# Patient Record
Sex: FEMALE | Race: WHITE | NOT HISPANIC OR LATINO | URBAN - METROPOLITAN AREA
[De-identification: names, ages, dates, MRNs, and addresses within clinical notes are randomized per-mention and may not be internally consistent; named-entity substitution may affect disease eponyms.]

---

## 2017-08-05 ENCOUNTER — INPATIENT (INPATIENT)
Facility: HOSPITAL | Age: 27
LOS: 2 days | Discharge: HOME | End: 2017-08-08
Attending: OBSTETRICS & GYNECOLOGY | Admitting: OBSTETRICS & GYNECOLOGY
Payer: COMMERCIAL

## 2017-08-05 VITALS — WEIGHT: 164.24 LBS | HEIGHT: 60 IN

## 2017-08-05 DIAGNOSIS — Z3A.00 WEEKS OF GESTATION OF PREGNANCY NOT SPECIFIED: ICD-10-CM

## 2017-08-05 DIAGNOSIS — O16.9 UNSPECIFIED MATERNAL HYPERTENSION, UNSPECIFIED TRIMESTER: ICD-10-CM

## 2017-08-05 DIAGNOSIS — O26.899 OTHER SPECIFIED PREGNANCY RELATED CONDITIONS, UNSPECIFIED TRIMESTER: ICD-10-CM

## 2017-08-05 LAB
ALBUMIN SERPL ELPH-MCNC: 3.3 G/DL — SIGNIFICANT CHANGE UP (ref 3.3–5)
ALBUMIN SERPL ELPH-MCNC: 3.3 G/DL — SIGNIFICANT CHANGE UP (ref 3.3–5)
ALP SERPL-CCNC: 161 U/L — HIGH (ref 40–120)
ALP SERPL-CCNC: 164 U/L — HIGH (ref 40–120)
ALT FLD-CCNC: 30 U/L — SIGNIFICANT CHANGE UP (ref 4–33)
ALT FLD-CCNC: 34 U/L — HIGH (ref 4–33)
APPEARANCE UR: SIGNIFICANT CHANGE UP
APTT BLD: 29.1 SEC — SIGNIFICANT CHANGE UP (ref 27.5–37.4)
APTT BLD: 30.2 SEC — SIGNIFICANT CHANGE UP (ref 27.5–37.4)
AST SERPL-CCNC: 24 U/L — SIGNIFICANT CHANGE UP (ref 4–32)
AST SERPL-CCNC: 28 U/L — SIGNIFICANT CHANGE UP (ref 4–32)
BACTERIA # UR AUTO: HIGH
BASOPHILS # BLD AUTO: 0.02 K/UL — SIGNIFICANT CHANGE UP (ref 0–0.2)
BASOPHILS # BLD AUTO: 0.03 K/UL — SIGNIFICANT CHANGE UP (ref 0–0.2)
BASOPHILS NFR BLD AUTO: 0.2 % — SIGNIFICANT CHANGE UP (ref 0–2)
BASOPHILS NFR BLD AUTO: 0.2 % — SIGNIFICANT CHANGE UP (ref 0–2)
BILIRUB SERPL-MCNC: 0.4 MG/DL — SIGNIFICANT CHANGE UP (ref 0.2–1.2)
BILIRUB SERPL-MCNC: 0.6 MG/DL — SIGNIFICANT CHANGE UP (ref 0.2–1.2)
BILIRUB UR-MCNC: NEGATIVE — SIGNIFICANT CHANGE UP
BLD GP AB SCN SERPL QL: NEGATIVE — SIGNIFICANT CHANGE UP
BLOOD UR QL VISUAL: NEGATIVE — SIGNIFICANT CHANGE UP
BUN SERPL-MCNC: 7 MG/DL — SIGNIFICANT CHANGE UP (ref 7–23)
BUN SERPL-MCNC: 7 MG/DL — SIGNIFICANT CHANGE UP (ref 7–23)
CALCIUM SERPL-MCNC: 9.1 MG/DL — SIGNIFICANT CHANGE UP (ref 8.4–10.5)
CALCIUM SERPL-MCNC: 9.2 MG/DL — SIGNIFICANT CHANGE UP (ref 8.4–10.5)
CHLORIDE SERPL-SCNC: 100 MMOL/L — SIGNIFICANT CHANGE UP (ref 98–107)
CHLORIDE SERPL-SCNC: 98 MMOL/L — SIGNIFICANT CHANGE UP (ref 98–107)
CO2 SERPL-SCNC: 18 MMOL/L — LOW (ref 22–31)
CO2 SERPL-SCNC: 19 MMOL/L — LOW (ref 22–31)
COLOR SPEC: SIGNIFICANT CHANGE UP
CREAT ?TM UR-MCNC: 38.63 MG/DL — SIGNIFICANT CHANGE UP
CREAT SERPL-MCNC: 0.53 MG/DL — SIGNIFICANT CHANGE UP (ref 0.5–1.3)
CREAT SERPL-MCNC: 0.55 MG/DL — SIGNIFICANT CHANGE UP (ref 0.5–1.3)
EOSINOPHIL # BLD AUTO: 0.05 K/UL — SIGNIFICANT CHANGE UP (ref 0–0.5)
EOSINOPHIL # BLD AUTO: 0.1 K/UL — SIGNIFICANT CHANGE UP (ref 0–0.5)
EOSINOPHIL NFR BLD AUTO: 0.4 % — SIGNIFICANT CHANGE UP (ref 0–6)
EOSINOPHIL NFR BLD AUTO: 1 % — SIGNIFICANT CHANGE UP (ref 0–6)
FIBRINOGEN PPP-MCNC: 633.3 MG/DL — HIGH (ref 310–510)
GLUCOSE SERPL-MCNC: 64 MG/DL — LOW (ref 70–99)
GLUCOSE SERPL-MCNC: 91 MG/DL — SIGNIFICANT CHANGE UP (ref 70–99)
GLUCOSE UR-MCNC: NEGATIVE — SIGNIFICANT CHANGE UP
HCT VFR BLD CALC: 34.7 % — SIGNIFICANT CHANGE UP (ref 34.5–45)
HCT VFR BLD CALC: 35 % — SIGNIFICANT CHANGE UP (ref 34.5–45)
HGB BLD-MCNC: 12.2 G/DL — SIGNIFICANT CHANGE UP (ref 11.5–15.5)
HGB BLD-MCNC: 12.2 G/DL — SIGNIFICANT CHANGE UP (ref 11.5–15.5)
IMM GRANULOCYTES # BLD AUTO: 0.09 # — SIGNIFICANT CHANGE UP
IMM GRANULOCYTES # BLD AUTO: 0.12 # — SIGNIFICANT CHANGE UP
IMM GRANULOCYTES NFR BLD AUTO: 0.9 % — SIGNIFICANT CHANGE UP (ref 0–1.5)
IMM GRANULOCYTES NFR BLD AUTO: 0.9 % — SIGNIFICANT CHANGE UP (ref 0–1.5)
INR BLD: 0.87 — LOW (ref 0.88–1.17)
INR BLD: 0.89 — SIGNIFICANT CHANGE UP (ref 0.88–1.17)
KETONES UR-MCNC: NEGATIVE — SIGNIFICANT CHANGE UP
LDH SERPL L TO P-CCNC: 171 U/L — SIGNIFICANT CHANGE UP (ref 135–225)
LDH SERPL L TO P-CCNC: 189 U/L — SIGNIFICANT CHANGE UP (ref 135–225)
LEUKOCYTE ESTERASE UR-ACNC: HIGH
LYMPHOCYTES # BLD AUTO: 1.17 K/UL — SIGNIFICANT CHANGE UP (ref 1–3.3)
LYMPHOCYTES # BLD AUTO: 1.4 K/UL — SIGNIFICANT CHANGE UP (ref 1–3.3)
LYMPHOCYTES # BLD AUTO: 10.3 % — LOW (ref 13–44)
LYMPHOCYTES # BLD AUTO: 11.8 % — LOW (ref 13–44)
MCHC RBC-ENTMCNC: 29.5 PG — SIGNIFICANT CHANGE UP (ref 27–34)
MCHC RBC-ENTMCNC: 29.9 PG — SIGNIFICANT CHANGE UP (ref 27–34)
MCHC RBC-ENTMCNC: 34.9 % — SIGNIFICANT CHANGE UP (ref 32–36)
MCHC RBC-ENTMCNC: 35.2 % — SIGNIFICANT CHANGE UP (ref 32–36)
MCV RBC AUTO: 84.5 FL — SIGNIFICANT CHANGE UP (ref 80–100)
MCV RBC AUTO: 85 FL — SIGNIFICANT CHANGE UP (ref 80–100)
MONOCYTES # BLD AUTO: 0.7 K/UL — SIGNIFICANT CHANGE UP (ref 0–0.9)
MONOCYTES # BLD AUTO: 0.92 K/UL — HIGH (ref 0–0.9)
MONOCYTES NFR BLD AUTO: 6.8 % — SIGNIFICANT CHANGE UP (ref 2–14)
MONOCYTES NFR BLD AUTO: 7 % — SIGNIFICANT CHANGE UP (ref 2–14)
MUCOUS THREADS # UR AUTO: SIGNIFICANT CHANGE UP
NEUTROPHILS # BLD AUTO: 11.02 K/UL — HIGH (ref 1.8–7.4)
NEUTROPHILS # BLD AUTO: 7.86 K/UL — HIGH (ref 1.8–7.4)
NEUTROPHILS NFR BLD AUTO: 79.1 % — HIGH (ref 43–77)
NEUTROPHILS NFR BLD AUTO: 81.4 % — HIGH (ref 43–77)
NITRITE UR-MCNC: NEGATIVE — SIGNIFICANT CHANGE UP
NRBC # FLD: 0 — SIGNIFICANT CHANGE UP
NRBC # FLD: 0 — SIGNIFICANT CHANGE UP
PH UR: 6.5 — SIGNIFICANT CHANGE UP (ref 4.6–8)
PLATELET # BLD AUTO: 243 K/UL — SIGNIFICANT CHANGE UP (ref 150–400)
PLATELET # BLD AUTO: 249 K/UL — SIGNIFICANT CHANGE UP (ref 150–400)
PMV BLD: 10.2 FL — SIGNIFICANT CHANGE UP (ref 7–13)
PMV BLD: 10.3 FL — SIGNIFICANT CHANGE UP (ref 7–13)
POTASSIUM SERPL-MCNC: 4 MMOL/L — SIGNIFICANT CHANGE UP (ref 3.5–5.3)
POTASSIUM SERPL-MCNC: 4.1 MMOL/L — SIGNIFICANT CHANGE UP (ref 3.5–5.3)
POTASSIUM SERPL-SCNC: 4 MMOL/L — SIGNIFICANT CHANGE UP (ref 3.5–5.3)
POTASSIUM SERPL-SCNC: 4.1 MMOL/L — SIGNIFICANT CHANGE UP (ref 3.5–5.3)
PROT SERPL-MCNC: 6.3 G/DL — SIGNIFICANT CHANGE UP (ref 6–8.3)
PROT SERPL-MCNC: 6.4 G/DL — SIGNIFICANT CHANGE UP (ref 6–8.3)
PROT UR-MCNC: 11.5 MG/DL — SIGNIFICANT CHANGE UP
PROT UR-MCNC: NEGATIVE — SIGNIFICANT CHANGE UP
PROTHROM AB SERPL-ACNC: 9.7 SEC — LOW (ref 9.8–13.1)
PROTHROM AB SERPL-ACNC: 9.9 SEC — SIGNIFICANT CHANGE UP (ref 9.8–13.1)
RBC # BLD: 4.08 M/UL — SIGNIFICANT CHANGE UP (ref 3.8–5.2)
RBC # BLD: 4.14 M/UL — SIGNIFICANT CHANGE UP (ref 3.8–5.2)
RBC # FLD: 13.1 % — SIGNIFICANT CHANGE UP (ref 10.3–14.5)
RBC # FLD: 13.1 % — SIGNIFICANT CHANGE UP (ref 10.3–14.5)
RBC CASTS # UR COMP ASSIST: SIGNIFICANT CHANGE UP (ref 0–?)
RH IG SCN BLD-IMP: POSITIVE — SIGNIFICANT CHANGE UP
RH IG SCN BLD-IMP: POSITIVE — SIGNIFICANT CHANGE UP
SODIUM SERPL-SCNC: 133 MMOL/L — LOW (ref 135–145)
SODIUM SERPL-SCNC: 134 MMOL/L — LOW (ref 135–145)
SP GR SPEC: 1.01 — SIGNIFICANT CHANGE UP (ref 1–1.03)
SQUAMOUS # UR AUTO: SIGNIFICANT CHANGE UP
T PALLIDUM AB TITR SER: NEGATIVE — SIGNIFICANT CHANGE UP
URATE SERPL-MCNC: 6 MG/DL — SIGNIFICANT CHANGE UP (ref 2.5–7)
URATE SERPL-MCNC: 6.3 MG/DL — SIGNIFICANT CHANGE UP (ref 2.5–7)
UROBILINOGEN FLD QL: NORMAL E.U. — SIGNIFICANT CHANGE UP (ref 0.1–0.2)
WBC # BLD: 13.54 K/UL — HIGH (ref 3.8–10.5)
WBC # BLD: 9.94 K/UL — SIGNIFICANT CHANGE UP (ref 3.8–10.5)
WBC # FLD AUTO: 13.54 K/UL — HIGH (ref 3.8–10.5)
WBC # FLD AUTO: 9.94 K/UL — SIGNIFICANT CHANGE UP (ref 3.8–10.5)
WBC UR QL: HIGH (ref 0–?)

## 2017-08-05 RX ORDER — OXYTOCIN 10 UNIT/ML
333.33 VIAL (ML) INJECTION
Qty: 20 | Refills: 0 | Status: DISCONTINUED | OUTPATIENT
Start: 2017-08-05 | End: 2017-08-06

## 2017-08-05 RX ORDER — MAGNESIUM SULFATE 500 MG/ML
4 VIAL (ML) INJECTION ONCE
Qty: 0 | Refills: 0 | Status: COMPLETED | OUTPATIENT
Start: 2017-08-05 | End: 2017-08-05

## 2017-08-05 RX ORDER — LABETALOL HCL 100 MG
200 TABLET ORAL
Qty: 0 | Refills: 0 | Status: DISCONTINUED | OUTPATIENT
Start: 2017-08-05 | End: 2017-08-08

## 2017-08-05 RX ORDER — MAGNESIUM SULFATE 500 MG/ML
1.5 VIAL (ML) INJECTION
Qty: 40 | Refills: 0 | Status: COMPLETED | OUTPATIENT
Start: 2017-08-05 | End: 2017-08-06

## 2017-08-05 RX ORDER — CITRIC ACID/SODIUM CITRATE 300-500 MG
15 SOLUTION, ORAL ORAL EVERY 4 HOURS
Qty: 0 | Refills: 0 | Status: DISCONTINUED | OUTPATIENT
Start: 2017-08-05 | End: 2017-08-06

## 2017-08-05 RX ORDER — SODIUM CHLORIDE 9 MG/ML
1000 INJECTION, SOLUTION INTRAVENOUS
Qty: 0 | Refills: 0 | Status: DISCONTINUED | OUTPATIENT
Start: 2017-08-05 | End: 2017-08-06

## 2017-08-05 RX ORDER — OXYTOCIN 10 UNIT/ML
2 VIAL (ML) INJECTION
Qty: 30 | Refills: 0 | Status: DISCONTINUED | OUTPATIENT
Start: 2017-08-05 | End: 2017-08-07

## 2017-08-05 RX ORDER — SODIUM CHLORIDE 9 MG/ML
1000 INJECTION, SOLUTION INTRAVENOUS ONCE
Qty: 0 | Refills: 0 | Status: COMPLETED | OUTPATIENT
Start: 2017-08-05 | End: 2017-08-05

## 2017-08-05 RX ADMIN — Medication 300 GRAM(S): at 14:34

## 2017-08-05 RX ADMIN — SODIUM CHLORIDE 2000 MILLILITER(S): 9 INJECTION, SOLUTION INTRAVENOUS at 20:42

## 2017-08-05 RX ADMIN — SODIUM CHLORIDE 125 MILLILITER(S): 9 INJECTION, SOLUTION INTRAVENOUS at 14:35

## 2017-08-05 RX ADMIN — Medication 50 GM/HR: at 19:10

## 2017-08-05 RX ADMIN — Medication 2 MILLIUNIT(S)/MIN: at 15:01

## 2017-08-05 RX ADMIN — Medication 50 GM/HR: at 15:00

## 2017-08-06 ENCOUNTER — TRANSCRIPTION ENCOUNTER (OUTPATIENT)
Age: 27
End: 2017-08-06

## 2017-08-06 LAB
ALBUMIN SERPL ELPH-MCNC: 2.7 G/DL — LOW (ref 3.3–5)
ALBUMIN SERPL ELPH-MCNC: 3 G/DL — LOW (ref 3.3–5)
ALBUMIN SERPL ELPH-MCNC: 3.3 G/DL — SIGNIFICANT CHANGE UP (ref 3.3–5)
ALP SERPL-CCNC: 126 U/L — HIGH (ref 40–120)
ALP SERPL-CCNC: 160 U/L — HIGH (ref 40–120)
ALP SERPL-CCNC: 169 U/L — HIGH (ref 40–120)
ALT FLD-CCNC: 28 U/L — SIGNIFICANT CHANGE UP (ref 4–33)
ALT FLD-CCNC: 35 U/L — HIGH (ref 4–33)
ALT FLD-CCNC: 36 U/L — HIGH (ref 4–33)
APTT BLD: 27.6 SEC — SIGNIFICANT CHANGE UP (ref 27.5–37.4)
APTT BLD: 28 SEC — SIGNIFICANT CHANGE UP (ref 27.5–37.4)
APTT BLD: 28.7 SEC — SIGNIFICANT CHANGE UP (ref 27.5–37.4)
AST SERPL-CCNC: 32 U/L — SIGNIFICANT CHANGE UP (ref 4–32)
AST SERPL-CCNC: 37 U/L — HIGH (ref 4–32)
AST SERPL-CCNC: 38 U/L — HIGH (ref 4–32)
BASOPHILS # BLD AUTO: 0.03 K/UL — SIGNIFICANT CHANGE UP (ref 0–0.2)
BASOPHILS # BLD AUTO: 0.05 K/UL — SIGNIFICANT CHANGE UP (ref 0–0.2)
BASOPHILS # BLD AUTO: 0.06 K/UL — SIGNIFICANT CHANGE UP (ref 0–0.2)
BASOPHILS NFR BLD AUTO: 0.2 % — SIGNIFICANT CHANGE UP (ref 0–2)
BASOPHILS NFR BLD AUTO: 0.2 % — SIGNIFICANT CHANGE UP (ref 0–2)
BASOPHILS NFR BLD AUTO: 0.3 % — SIGNIFICANT CHANGE UP (ref 0–2)
BILIRUB SERPL-MCNC: 0.5 MG/DL — SIGNIFICANT CHANGE UP (ref 0.2–1.2)
BILIRUB SERPL-MCNC: 0.7 MG/DL — SIGNIFICANT CHANGE UP (ref 0.2–1.2)
BILIRUB SERPL-MCNC: 0.9 MG/DL — SIGNIFICANT CHANGE UP (ref 0.2–1.2)
BUN SERPL-MCNC: 10 MG/DL — SIGNIFICANT CHANGE UP (ref 7–23)
BUN SERPL-MCNC: 11 MG/DL — SIGNIFICANT CHANGE UP (ref 7–23)
BUN SERPL-MCNC: 8 MG/DL — SIGNIFICANT CHANGE UP (ref 7–23)
CALCIUM SERPL-MCNC: 8.8 MG/DL — SIGNIFICANT CHANGE UP (ref 8.4–10.5)
CALCIUM SERPL-MCNC: 9.2 MG/DL — SIGNIFICANT CHANGE UP (ref 8.4–10.5)
CALCIUM SERPL-MCNC: 9.4 MG/DL — SIGNIFICANT CHANGE UP (ref 8.4–10.5)
CHLORIDE SERPL-SCNC: 93 MMOL/L — LOW (ref 98–107)
CHLORIDE SERPL-SCNC: 93 MMOL/L — LOW (ref 98–107)
CHLORIDE SERPL-SCNC: 94 MMOL/L — LOW (ref 98–107)
CO2 SERPL-SCNC: 15 MMOL/L — LOW (ref 22–31)
CO2 SERPL-SCNC: 17 MMOL/L — LOW (ref 22–31)
CO2 SERPL-SCNC: 17 MMOL/L — LOW (ref 22–31)
CREAT SERPL-MCNC: 0.81 MG/DL — SIGNIFICANT CHANGE UP (ref 0.5–1.3)
CREAT SERPL-MCNC: 1.2 MG/DL — SIGNIFICANT CHANGE UP (ref 0.5–1.3)
CREAT SERPL-MCNC: 1.36 MG/DL — HIGH (ref 0.5–1.3)
EOSINOPHIL # BLD AUTO: 0 K/UL — SIGNIFICANT CHANGE UP (ref 0–0.5)
EOSINOPHIL # BLD AUTO: 0 K/UL — SIGNIFICANT CHANGE UP (ref 0–0.5)
EOSINOPHIL # BLD AUTO: 0.01 K/UL — SIGNIFICANT CHANGE UP (ref 0–0.5)
EOSINOPHIL NFR BLD AUTO: 0 % — SIGNIFICANT CHANGE UP (ref 0–6)
EOSINOPHIL NFR BLD AUTO: 0 % — SIGNIFICANT CHANGE UP (ref 0–6)
EOSINOPHIL NFR BLD AUTO: 0.1 % — SIGNIFICANT CHANGE UP (ref 0–6)
FIBRINOGEN PPP-MCNC: 620 MG/DL — HIGH (ref 310–510)
FIBRINOGEN PPP-MCNC: 634 MG/DL — HIGH (ref 310–510)
FIBRINOGEN PPP-MCNC: 640.9 MG/DL — HIGH (ref 310–510)
GLUCOSE SERPL-MCNC: 117 MG/DL — HIGH (ref 70–99)
GLUCOSE SERPL-MCNC: 146 MG/DL — HIGH (ref 70–99)
GLUCOSE SERPL-MCNC: 77 MG/DL — SIGNIFICANT CHANGE UP (ref 70–99)
HCT VFR BLD CALC: 27.7 % — LOW (ref 34.5–45)
HCT VFR BLD CALC: 34.4 % — LOW (ref 34.5–45)
HCT VFR BLD CALC: 37.6 % — SIGNIFICANT CHANGE UP (ref 34.5–45)
HGB BLD-MCNC: 10.1 G/DL — LOW (ref 11.5–15.5)
HGB BLD-MCNC: 12.2 G/DL — SIGNIFICANT CHANGE UP (ref 11.5–15.5)
HGB BLD-MCNC: 13.2 G/DL — SIGNIFICANT CHANGE UP (ref 11.5–15.5)
IMM GRANULOCYTES # BLD AUTO: 0.12 # — SIGNIFICANT CHANGE UP
IMM GRANULOCYTES # BLD AUTO: 0.22 # — SIGNIFICANT CHANGE UP
IMM GRANULOCYTES # BLD AUTO: 0.26 # — SIGNIFICANT CHANGE UP
IMM GRANULOCYTES NFR BLD AUTO: 0.7 % — SIGNIFICANT CHANGE UP (ref 0–1.5)
IMM GRANULOCYTES NFR BLD AUTO: 1.2 % — SIGNIFICANT CHANGE UP (ref 0–1.5)
IMM GRANULOCYTES NFR BLD AUTO: 1.2 % — SIGNIFICANT CHANGE UP (ref 0–1.5)
INR BLD: 0.82 — LOW (ref 0.88–1.17)
INR BLD: 0.83 — LOW (ref 0.88–1.17)
INR BLD: 0.84 — LOW (ref 0.88–1.17)
LDH SERPL L TO P-CCNC: 201 U/L — SIGNIFICANT CHANGE UP (ref 135–225)
LDH SERPL L TO P-CCNC: 293 U/L — HIGH (ref 135–225)
LDH SERPL L TO P-CCNC: 318 U/L — HIGH (ref 135–225)
LYMPHOCYTES # BLD AUTO: 0.68 K/UL — LOW (ref 1–3.3)
LYMPHOCYTES # BLD AUTO: 1.18 K/UL — SIGNIFICANT CHANGE UP (ref 1–3.3)
LYMPHOCYTES # BLD AUTO: 1.26 K/UL — SIGNIFICANT CHANGE UP (ref 1–3.3)
LYMPHOCYTES # BLD AUTO: 3.1 % — LOW (ref 13–44)
LYMPHOCYTES # BLD AUTO: 6.5 % — LOW (ref 13–44)
LYMPHOCYTES # BLD AUTO: 6.8 % — LOW (ref 13–44)
MAGNESIUM SERPL-MCNC: 5.2 MG/DL — HIGH (ref 1.6–2.6)
MAGNESIUM SERPL-MCNC: 6.6 MG/DL — HIGH (ref 1.6–2.6)
MAGNESIUM SERPL-MCNC: 8.3 MG/DL — CRITICAL HIGH (ref 1.6–2.6)
MCHC RBC-ENTMCNC: 30.4 PG — SIGNIFICANT CHANGE UP (ref 27–34)
MCHC RBC-ENTMCNC: 30.4 PG — SIGNIFICANT CHANGE UP (ref 27–34)
MCHC RBC-ENTMCNC: 30.5 PG — SIGNIFICANT CHANGE UP (ref 27–34)
MCHC RBC-ENTMCNC: 35.1 % — SIGNIFICANT CHANGE UP (ref 32–36)
MCHC RBC-ENTMCNC: 35.5 % — SIGNIFICANT CHANGE UP (ref 32–36)
MCHC RBC-ENTMCNC: 36.5 % — HIGH (ref 32–36)
MCV RBC AUTO: 83.4 FL — SIGNIFICANT CHANGE UP (ref 80–100)
MCV RBC AUTO: 85.8 FL — SIGNIFICANT CHANGE UP (ref 80–100)
MCV RBC AUTO: 86.8 FL — SIGNIFICANT CHANGE UP (ref 80–100)
MONOCYTES # BLD AUTO: 0.99 K/UL — HIGH (ref 0–0.9)
MONOCYTES # BLD AUTO: 1.07 K/UL — HIGH (ref 0–0.9)
MONOCYTES # BLD AUTO: 1.19 K/UL — HIGH (ref 0–0.9)
MONOCYTES NFR BLD AUTO: 4.6 % — SIGNIFICANT CHANGE UP (ref 2–14)
MONOCYTES NFR BLD AUTO: 5.8 % — SIGNIFICANT CHANGE UP (ref 2–14)
MONOCYTES NFR BLD AUTO: 6.5 % — SIGNIFICANT CHANGE UP (ref 2–14)
NEUTROPHILS # BLD AUTO: 15.69 K/UL — HIGH (ref 1.8–7.4)
NEUTROPHILS # BLD AUTO: 15.94 K/UL — HIGH (ref 1.8–7.4)
NEUTROPHILS # BLD AUTO: 19.71 K/UL — HIGH (ref 1.8–7.4)
NEUTROPHILS NFR BLD AUTO: 85.8 % — HIGH (ref 43–77)
NEUTROPHILS NFR BLD AUTO: 86.1 % — HIGH (ref 43–77)
NEUTROPHILS NFR BLD AUTO: 90.9 % — HIGH (ref 43–77)
NRBC # FLD: 0 — SIGNIFICANT CHANGE UP
PLATELET # BLD AUTO: 223 K/UL — SIGNIFICANT CHANGE UP (ref 150–400)
PLATELET # BLD AUTO: 241 K/UL — SIGNIFICANT CHANGE UP (ref 150–400)
PLATELET # BLD AUTO: 268 K/UL — SIGNIFICANT CHANGE UP (ref 150–400)
PMV BLD: 10.2 FL — SIGNIFICANT CHANGE UP (ref 7–13)
PMV BLD: 10.2 FL — SIGNIFICANT CHANGE UP (ref 7–13)
PMV BLD: 10.4 FL — SIGNIFICANT CHANGE UP (ref 7–13)
POTASSIUM SERPL-MCNC: 4 MMOL/L — SIGNIFICANT CHANGE UP (ref 3.5–5.3)
POTASSIUM SERPL-MCNC: 4 MMOL/L — SIGNIFICANT CHANGE UP (ref 3.5–5.3)
POTASSIUM SERPL-MCNC: 4.4 MMOL/L — SIGNIFICANT CHANGE UP (ref 3.5–5.3)
POTASSIUM SERPL-SCNC: 4 MMOL/L — SIGNIFICANT CHANGE UP (ref 3.5–5.3)
POTASSIUM SERPL-SCNC: 4 MMOL/L — SIGNIFICANT CHANGE UP (ref 3.5–5.3)
POTASSIUM SERPL-SCNC: 4.4 MMOL/L — SIGNIFICANT CHANGE UP (ref 3.5–5.3)
PROT SERPL-MCNC: 5.2 G/DL — LOW (ref 6–8.3)
PROT SERPL-MCNC: 6.1 G/DL — SIGNIFICANT CHANGE UP (ref 6–8.3)
PROT SERPL-MCNC: 6.4 G/DL — SIGNIFICANT CHANGE UP (ref 6–8.3)
PROTHROM AB SERPL-ACNC: 9.2 SEC — LOW (ref 9.8–13.1)
PROTHROM AB SERPL-ACNC: 9.4 SEC — LOW (ref 9.8–13.1)
PROTHROM AB SERPL-ACNC: 9.4 SEC — LOW (ref 9.8–13.1)
RBC # BLD: 3.32 M/UL — LOW (ref 3.8–5.2)
RBC # BLD: 4.01 M/UL — SIGNIFICANT CHANGE UP (ref 3.8–5.2)
RBC # BLD: 4.33 M/UL — SIGNIFICANT CHANGE UP (ref 3.8–5.2)
RBC # FLD: 13 % — SIGNIFICANT CHANGE UP (ref 10.3–14.5)
RBC # FLD: 13.1 % — SIGNIFICANT CHANGE UP (ref 10.3–14.5)
RBC # FLD: 13.2 % — SIGNIFICANT CHANGE UP (ref 10.3–14.5)
SODIUM SERPL-SCNC: 127 MMOL/L — LOW (ref 135–145)
SODIUM SERPL-SCNC: 128 MMOL/L — LOW (ref 135–145)
SODIUM SERPL-SCNC: 131 MMOL/L — LOW (ref 135–145)
URATE SERPL-MCNC: 6.6 MG/DL — SIGNIFICANT CHANGE UP (ref 2.5–7)
URATE SERPL-MCNC: 7.2 MG/DL — HIGH (ref 2.5–7)
URATE SERPL-MCNC: 7.9 MG/DL — HIGH (ref 2.5–7)
WBC # BLD: 18.21 K/UL — HIGH (ref 3.8–10.5)
WBC # BLD: 18.56 K/UL — HIGH (ref 3.8–10.5)
WBC # BLD: 21.69 K/UL — HIGH (ref 3.8–10.5)
WBC # FLD AUTO: 18.21 K/UL — HIGH (ref 3.8–10.5)
WBC # FLD AUTO: 18.56 K/UL — HIGH (ref 3.8–10.5)
WBC # FLD AUTO: 21.69 K/UL — HIGH (ref 3.8–10.5)

## 2017-08-06 PROCEDURE — 88307 TISSUE EXAM BY PATHOLOGIST: CPT | Mod: 26

## 2017-08-06 RX ORDER — OXYTOCIN 10 UNIT/ML
41.67 VIAL (ML) INJECTION
Qty: 20 | Refills: 0 | Status: COMPLETED | OUTPATIENT
Start: 2017-08-06 | End: 2017-08-06

## 2017-08-06 RX ORDER — OXYCODONE HYDROCHLORIDE 5 MG/1
5 TABLET ORAL
Qty: 0 | Refills: 0 | Status: DISCONTINUED | OUTPATIENT
Start: 2017-08-06 | End: 2017-08-08

## 2017-08-06 RX ORDER — SODIUM CHLORIDE 9 MG/ML
3 INJECTION INTRAMUSCULAR; INTRAVENOUS; SUBCUTANEOUS EVERY 8 HOURS
Qty: 0 | Refills: 0 | Status: DISCONTINUED | OUTPATIENT
Start: 2017-08-06 | End: 2017-08-08

## 2017-08-06 RX ORDER — SODIUM CHLORIDE 9 MG/ML
1000 INJECTION, SOLUTION INTRAVENOUS
Qty: 0 | Refills: 0 | Status: DISCONTINUED | OUTPATIENT
Start: 2017-08-06 | End: 2017-08-08

## 2017-08-06 RX ORDER — OXYCODONE HYDROCHLORIDE 5 MG/1
5 TABLET ORAL EVERY 4 HOURS
Qty: 0 | Refills: 0 | Status: DISCONTINUED | OUTPATIENT
Start: 2017-08-06 | End: 2017-08-08

## 2017-08-06 RX ORDER — IBUPROFEN 200 MG
600 TABLET ORAL EVERY 6 HOURS
Qty: 0 | Refills: 0 | Status: COMPLETED | OUTPATIENT
Start: 2017-08-06 | End: 2018-07-05

## 2017-08-06 RX ORDER — OXYTOCIN 10 UNIT/ML
41.67 VIAL (ML) INJECTION
Qty: 20 | Refills: 0 | Status: DISCONTINUED | OUTPATIENT
Start: 2017-08-06 | End: 2017-08-07

## 2017-08-06 RX ORDER — DOCUSATE SODIUM 100 MG
100 CAPSULE ORAL
Qty: 0 | Refills: 0 | Status: DISCONTINUED | OUTPATIENT
Start: 2017-08-06 | End: 2017-08-08

## 2017-08-06 RX ORDER — DIBUCAINE 1 %
1 OINTMENT (GRAM) RECTAL EVERY 4 HOURS
Qty: 0 | Refills: 0 | Status: DISCONTINUED | OUTPATIENT
Start: 2017-08-06 | End: 2017-08-08

## 2017-08-06 RX ORDER — PRAMOXINE HYDROCHLORIDE 150 MG/15G
1 AEROSOL, FOAM RECTAL EVERY 4 HOURS
Qty: 0 | Refills: 0 | Status: DISCONTINUED | OUTPATIENT
Start: 2017-08-06 | End: 2017-08-06

## 2017-08-06 RX ORDER — HYDROCORTISONE 1 %
1 OINTMENT (GRAM) TOPICAL EVERY 4 HOURS
Qty: 0 | Refills: 0 | Status: DISCONTINUED | OUTPATIENT
Start: 2017-08-06 | End: 2017-08-06

## 2017-08-06 RX ORDER — MAGNESIUM HYDROXIDE 400 MG/1
30 TABLET, CHEWABLE ORAL
Qty: 0 | Refills: 0 | Status: DISCONTINUED | OUTPATIENT
Start: 2017-08-06 | End: 2017-08-08

## 2017-08-06 RX ORDER — ACETAMINOPHEN 500 MG
975 TABLET ORAL EVERY 6 HOURS
Qty: 0 | Refills: 0 | Status: COMPLETED | OUTPATIENT
Start: 2017-08-06 | End: 2018-07-05

## 2017-08-06 RX ORDER — LEVOTHYROXINE SODIUM 125 MCG
37.5 TABLET ORAL DAILY
Qty: 0 | Refills: 0 | Status: DISCONTINUED | OUTPATIENT
Start: 2017-08-06 | End: 2017-08-08

## 2017-08-06 RX ORDER — TETANUS TOXOID, REDUCED DIPHTHERIA TOXOID AND ACELLULAR PERTUSSIS VACCINE, ADSORBED 5; 2.5; 8; 8; 2.5 [IU]/.5ML; [IU]/.5ML; UG/.5ML; UG/.5ML; UG/.5ML
0.5 SUSPENSION INTRAMUSCULAR ONCE
Qty: 0 | Refills: 0 | Status: DISCONTINUED | OUTPATIENT
Start: 2017-08-06 | End: 2017-08-08

## 2017-08-06 RX ORDER — ONDANSETRON 8 MG/1
4 TABLET, FILM COATED ORAL ONCE
Qty: 0 | Refills: 0 | Status: COMPLETED | OUTPATIENT
Start: 2017-08-06 | End: 2017-08-06

## 2017-08-06 RX ORDER — PRAMOXINE HYDROCHLORIDE 150 MG/15G
1 AEROSOL, FOAM RECTAL EVERY 4 HOURS
Qty: 0 | Refills: 0 | Status: DISCONTINUED | OUTPATIENT
Start: 2017-08-06 | End: 2017-08-07

## 2017-08-06 RX ORDER — DIBUCAINE 1 %
1 OINTMENT (GRAM) RECTAL EVERY 4 HOURS
Qty: 0 | Refills: 0 | Status: DISCONTINUED | OUTPATIENT
Start: 2017-08-06 | End: 2017-08-06

## 2017-08-06 RX ORDER — KETOROLAC TROMETHAMINE 30 MG/ML
30 SYRINGE (ML) INJECTION ONCE
Qty: 0 | Refills: 0 | Status: DISCONTINUED | OUTPATIENT
Start: 2017-08-06 | End: 2017-08-06

## 2017-08-06 RX ORDER — LANOLIN
1 OINTMENT (GRAM) TOPICAL EVERY 6 HOURS
Qty: 0 | Refills: 0 | Status: DISCONTINUED | OUTPATIENT
Start: 2017-08-06 | End: 2017-08-08

## 2017-08-06 RX ORDER — SIMETHICONE 80 MG/1
80 TABLET, CHEWABLE ORAL EVERY 6 HOURS
Qty: 0 | Refills: 0 | Status: DISCONTINUED | OUTPATIENT
Start: 2017-08-06 | End: 2017-08-08

## 2017-08-06 RX ORDER — SODIUM CHLORIDE 9 MG/ML
500 INJECTION, SOLUTION INTRAVENOUS ONCE
Qty: 0 | Refills: 0 | Status: COMPLETED | OUTPATIENT
Start: 2017-08-06 | End: 2017-08-06

## 2017-08-06 RX ORDER — DIPHENHYDRAMINE HCL 50 MG
25 CAPSULE ORAL EVERY 6 HOURS
Qty: 0 | Refills: 0 | Status: DISCONTINUED | OUTPATIENT
Start: 2017-08-06 | End: 2017-08-08

## 2017-08-06 RX ORDER — HYDROCORTISONE 1 %
1 OINTMENT (GRAM) TOPICAL EVERY 4 HOURS
Qty: 0 | Refills: 0 | Status: DISCONTINUED | OUTPATIENT
Start: 2017-08-06 | End: 2017-08-07

## 2017-08-06 RX ORDER — SODIUM CHLORIDE 9 MG/ML
3 INJECTION INTRAMUSCULAR; INTRAVENOUS; SUBCUTANEOUS EVERY 8 HOURS
Qty: 0 | Refills: 0 | Status: DISCONTINUED | OUTPATIENT
Start: 2017-08-06 | End: 2017-08-06

## 2017-08-06 RX ORDER — GLYCERIN ADULT
1 SUPPOSITORY, RECTAL RECTAL AT BEDTIME
Qty: 0 | Refills: 0 | Status: DISCONTINUED | OUTPATIENT
Start: 2017-08-06 | End: 2017-08-08

## 2017-08-06 RX ORDER — AER TRAVELER 0.5 G/1
1 SOLUTION RECTAL; TOPICAL EVERY 4 HOURS
Qty: 0 | Refills: 0 | Status: DISCONTINUED | OUTPATIENT
Start: 2017-08-06 | End: 2017-08-06

## 2017-08-06 RX ORDER — AER TRAVELER 0.5 G/1
1 SOLUTION RECTAL; TOPICAL EVERY 4 HOURS
Qty: 0 | Refills: 0 | Status: DISCONTINUED | OUTPATIENT
Start: 2017-08-06 | End: 2017-08-08

## 2017-08-06 RX ORDER — MAGNESIUM SULFATE 500 MG/ML
1.5 VIAL (ML) INJECTION
Qty: 40 | Refills: 0 | Status: DISCONTINUED | OUTPATIENT
Start: 2017-08-06 | End: 2017-08-07

## 2017-08-06 RX ADMIN — Medication 200 MILLIGRAM(S): at 00:09

## 2017-08-06 RX ADMIN — Medication 37.5 GM/HR: at 11:15

## 2017-08-06 RX ADMIN — Medication 37.5 MICROGRAM(S): at 16:11

## 2017-08-06 RX ADMIN — Medication 37.5 GM/HR: at 17:22

## 2017-08-06 RX ADMIN — Medication 37.5 GM/HR: at 03:40

## 2017-08-06 RX ADMIN — Medication 37.5 GM/HR: at 19:14

## 2017-08-06 RX ADMIN — SIMETHICONE 80 MILLIGRAM(S): 80 TABLET, CHEWABLE ORAL at 22:40

## 2017-08-06 RX ADMIN — Medication 30 MILLIGRAM(S): at 10:03

## 2017-08-06 RX ADMIN — SODIUM CHLORIDE 3 MILLILITER(S): 9 INJECTION INTRAMUSCULAR; INTRAVENOUS; SUBCUTANEOUS at 15:00

## 2017-08-06 RX ADMIN — MAGNESIUM HYDROXIDE 30 MILLILITER(S): 400 TABLET, CHEWABLE ORAL at 23:45

## 2017-08-06 RX ADMIN — Medication 30 MILLIGRAM(S): at 10:00

## 2017-08-06 RX ADMIN — Medication 200 MILLIGRAM(S): at 12:55

## 2017-08-06 RX ADMIN — Medication 2 MILLIUNIT(S)/MIN: at 03:40

## 2017-08-06 RX ADMIN — SODIUM CHLORIDE 62.5 MILLILITER(S): 9 INJECTION, SOLUTION INTRAVENOUS at 03:40

## 2017-08-06 RX ADMIN — OXYCODONE HYDROCHLORIDE 5 MILLIGRAM(S): 5 TABLET ORAL at 23:10

## 2017-08-06 RX ADMIN — SODIUM CHLORIDE 1000 MILLILITER(S): 9 INJECTION, SOLUTION INTRAVENOUS at 03:10

## 2017-08-06 RX ADMIN — Medication 2 MILLIUNIT(S)/MIN: at 07:49

## 2017-08-06 RX ADMIN — OXYCODONE HYDROCHLORIDE 5 MILLIGRAM(S): 5 TABLET ORAL at 23:40

## 2017-08-06 RX ADMIN — SODIUM CHLORIDE 50 MILLILITER(S): 9 INJECTION, SOLUTION INTRAVENOUS at 00:22

## 2017-08-06 RX ADMIN — Medication 125 MILLIUNIT(S)/MIN: at 12:01

## 2017-08-06 RX ADMIN — ONDANSETRON 4 MILLIGRAM(S): 8 TABLET, FILM COATED ORAL at 02:00

## 2017-08-06 NOTE — PROVIDER CONTACT NOTE (CRITICAL VALUE NOTIFICATION) - SITUATION
26y/o mother s/p VAVD. IOL for preeclampsia on Magnesium from 1430 on . Magnesium level drawn per MD order.

## 2017-08-06 NOTE — PROVIDER CONTACT NOTE (CRITICAL VALUE NOTIFICATION) - ASSESSMENT
Magnesium level 8.3 Pt denies headache, blurry vision, shortness of breath. Alert and oriented x2. Lungs clear, reflexes +1. Pt has not voided yet PP, MD [facility] aware. Will continue to monitor.

## 2017-08-06 NOTE — DISCHARGE NOTE OB - MATERIALS PROVIDED
Vaccinations/Shaken Baby Prevention Handout/Breastfeeding Guide and Packet/[facility] [place] Screening Program/Back To Sleep Handout/Birth Certificate Instructions/Breastfeeding Log/Breastfeeding Mother’s Support Group Information/Guide to Postpartum Care/[facility] Hearing Screen Program/  Immunization Record

## 2017-08-06 NOTE — PROVIDER CONTACT NOTE (CRITICAL VALUE NOTIFICATION) - BACKGROUND
28y/o mother s/p VAVD  @ 07:49am. IOL for preeclampsia on Magnesium from 1430 on . Magnesium level drawn along with [facility] labs.

## 2017-08-06 NOTE — DISCHARGE NOTE OB - CARE PLAN
Principal Discharge DX:	Vacuum extractor delivery, delivered  Goal:	Recovery  Instructions for follow-up, activity and diet:	Reg diet

## 2017-08-06 NOTE — DISCHARGE NOTE OB - CARE PROVIDER_API CALL
Db Chester), Obstetrics and Gynecology  [address] [place] [address]  Phone: [phone]  Fax: [phone]

## 2017-08-06 NOTE — DISCHARGE NOTE OB - MEDICATION SUMMARY - MEDICATIONS TO TAKE
I will START or STAY ON the medications listed below when I get home from the hospital:  None I will START or STAY ON the medications listed below when I get home from the hospital:    labetalol 200 mg oral tablet  -- 1 tab(s) by mouth 2 times a day. Hold if SBP<110 or DBP<60.  -- Indication: For Hypertension I will START or STAY ON the medications listed below when I get home from the hospital:    acetaminophen 325 mg oral tablet  -- 2 tab(s) by mouth every 4 hours  -- Indication: For Pain    labetalol 200 mg oral tablet  -- 1 tab(s) by mouth 2 times a day. Hold if SBP<110 or DBP<60.  -- Indication: For Hypertension

## 2017-08-06 NOTE — DISCHARGE NOTE OB - PATIENT PORTAL LINK FT
“You can access the FollowHealth Patient Portal, offered by [facility], by registering with the following website: [email or web address]”

## 2017-08-07 DIAGNOSIS — O14.10 SEVERE PRE-ECLAMPSIA, UNSPECIFIED TRIMESTER: ICD-10-CM

## 2017-08-07 RX ORDER — ACETAMINOPHEN 500 MG
975 TABLET ORAL EVERY 6 HOURS
Qty: 0 | Refills: 0 | Status: DISCONTINUED | OUTPATIENT
Start: 2017-08-07 | End: 2017-08-08

## 2017-08-07 RX ORDER — IBUPROFEN 200 MG
600 TABLET ORAL EVERY 6 HOURS
Qty: 0 | Refills: 0 | Status: DISCONTINUED | OUTPATIENT
Start: 2017-08-07 | End: 2017-08-08

## 2017-08-07 RX ORDER — PRAMOXINE HYDROCHLORIDE 150 MG/15G
1 AEROSOL, FOAM RECTAL EVERY 4 HOURS
Qty: 0 | Refills: 0 | Status: DISCONTINUED | OUTPATIENT
Start: 2017-08-07 | End: 2017-08-08

## 2017-08-07 RX ORDER — HYDROCORTISONE 1 %
1 OINTMENT (GRAM) TOPICAL EVERY 4 HOURS
Qty: 0 | Refills: 0 | Status: DISCONTINUED | OUTPATIENT
Start: 2017-08-07 | End: 2017-08-08

## 2017-08-07 RX ORDER — LABETALOL HCL 100 MG
1 TABLET ORAL
Qty: 60 | Refills: 1 | OUTPATIENT
Start: 2017-08-07 | End: 2017-10-05

## 2017-08-07 RX ADMIN — Medication 975 MILLIGRAM(S): at 03:55

## 2017-08-07 RX ADMIN — OXYCODONE HYDROCHLORIDE 5 MILLIGRAM(S): 5 TABLET ORAL at 04:40

## 2017-08-07 RX ADMIN — Medication 600 MILLIGRAM(S): at 17:48

## 2017-08-07 RX ADMIN — Medication 600 MILLIGRAM(S): at 18:31

## 2017-08-07 RX ADMIN — Medication 200 MILLIGRAM(S): at 01:40

## 2017-08-07 RX ADMIN — SIMETHICONE 80 MILLIGRAM(S): 80 TABLET, CHEWABLE ORAL at 09:47

## 2017-08-07 RX ADMIN — Medication 600 MILLIGRAM(S): at 04:40

## 2017-08-07 RX ADMIN — Medication 975 MILLIGRAM(S): at 10:15

## 2017-08-07 RX ADMIN — SODIUM CHLORIDE 3 MILLILITER(S): 9 INJECTION INTRAMUSCULAR; INTRAVENOUS; SUBCUTANEOUS at 12:20

## 2017-08-07 RX ADMIN — Medication 600 MILLIGRAM(S): at 03:54

## 2017-08-07 RX ADMIN — Medication 975 MILLIGRAM(S): at 18:31

## 2017-08-07 RX ADMIN — Medication 37.5 GM/HR: at 07:38

## 2017-08-07 RX ADMIN — Medication 975 MILLIGRAM(S): at 04:40

## 2017-08-07 RX ADMIN — Medication 975 MILLIGRAM(S): at 09:47

## 2017-08-07 RX ADMIN — Medication 600 MILLIGRAM(S): at 12:50

## 2017-08-07 RX ADMIN — Medication 975 MILLIGRAM(S): at 17:48

## 2017-08-07 RX ADMIN — Medication 100 MILLIGRAM(S): at 09:47

## 2017-08-07 RX ADMIN — Medication 200 MILLIGRAM(S): at 23:01

## 2017-08-07 RX ADMIN — SODIUM CHLORIDE 3 MILLILITER(S): 9 INJECTION INTRAMUSCULAR; INTRAVENOUS; SUBCUTANEOUS at 22:37

## 2017-08-07 RX ADMIN — Medication 600 MILLIGRAM(S): at 12:19

## 2017-08-07 RX ADMIN — Medication 1 TABLET(S): at 12:19

## 2017-08-07 RX ADMIN — Medication 200 MILLIGRAM(S): at 12:19

## 2017-08-07 RX ADMIN — OXYCODONE HYDROCHLORIDE 5 MILLIGRAM(S): 5 TABLET ORAL at 04:00

## 2017-08-07 NOTE — PROGRESS NOTE ADULT - SUBJECTIVE AND OBJECTIVE BOX
Patient seen and examined at bedside, no acute overnight events. No acute complaints, pain well controlled. Patient is ambulating, voiding spontaneously, passing gas, and tolerating regular diet. Pt has not had a BM. Pt is both breast and bottle feeding her baby.    Vital Signs Last 24 Hours  T(C): 36.6 (08-07-17 @ 06:00), Max: 97.5 (08-06-17 @ 17:45)  HR: 76 (08-07-17 @ 06:00) (76 - 110)  BP: 124/70 (08-07-17 @ 06:00) (124/69 - 155/74)  RR: 17 (08-07-17 @ 06:00) (16 - 20)  SpO2: 99% (08-07-17 @ 06:00) (94% - 100%)    Physical Exam:  General: NAD  Abdomen: Soft, non-tender, non-distended, fundus firm  Pelvic: Lochia wnl    Labs:    Blood Type: A Positive  RPR: Negative               10.1   18.21 )-----------( 223      ( 08-06 @ 16:45 )             27.7                12.2   21.69 )-----------( 241      ( 08-06 @ 09:15 )             34.4                13.2   18.56 )-----------( 268      ( 08-06 @ 01:30 )             37.6         MEDICATIONS  (STANDING):  magnesium sulfate Infusion 1.5 Gm/Hr (37.5 mL/Hr) IV Continuous <Continuous>  labetalol 200 milliGRAM(s) Oral two times a day  lactated ringers. 1000 milliLiter(s) (62.5 mL/Hr) IV Continuous <Continuous>  sodium chloride 0.9% lock flush 3 milliLiter(s) IV Push every 8 hours  diphtheria/tetanus/pertussis (acellular) Vaccine (ADAcel) 0.5 milliLiter(s) IntraMuscular once  prenatal multivitamin 1 Tablet(s) Oral daily  oxyCODONE    IR 5 milliGRAM(s) Oral every 3 hours  levothyroxine 37.5 MICROGram(s) Oral daily  magnesium sulfate Infusion 1.5 Gm/Hr (37.5 mL/Hr) IV Continuous <Continuous>  acetaminophen   Tablet. 975 milliGRAM(s) Oral every 6 hours  ibuprofen  Tablet 600 milliGRAM(s) Oral every 6 hours    MEDICATIONS  (PRN):  dibucaine 1% Ointment 1 Application(s) Topical every 4 hours PRN Perineal Discomfort  lanolin Ointment 1 Application(s) Topical every 6 hours PRN Sore Nipples  witch hazel Pads 1 Application(s) Topical every 4 hours PRN Perineal Discomfort  simethicone 80 milliGRAM(s) Chew every 6 hours PRN Gas  diphenhydrAMINE   Capsule 25 milliGRAM(s) Oral every 6 hours PRN Itching  glycerin Suppository - Adult 1 Suppository(s) Rectal at bedtime PRN Constipation  magnesium hydroxide Suspension 30 milliLiter(s) Oral two times a day PRN Constipation  docusate sodium 100 milliGRAM(s) Oral two times a day PRN Stool Softening  oxyCODONE    IR 5 milliGRAM(s) Oral every 4 hours PRN Severe Pain (7 -10)  pramoxine 1%/zinc 5% Cream 1 Application(s) Topical every 4 hours PRN perineal discomfort  hydrocortisone 1% Cream 1 Application(s) Topical every 4 hours PRN perineal discomfort

## 2017-08-07 NOTE — PROGRESS NOTE ADULT - SUBJECTIVE AND OBJECTIVE BOX
Post-partum Note,   She is a  27y woman who is now post-partum day: 1    Subjective:  The patient feels well.  She is ambulating.   She is tolerating regular diet.  She denies nausea and vomiting; denies fever.  She is voiding.  Her pain is controlled.  She reports normal postpartum bleeding.  She is breastfeeding.  She is formula feeding.    Physical exam:    Vital Signs Last 24 Hrs  T(C): 36.7 (07 Aug 2017 04:00), Max: 97.5 (06 Aug 2017 17:45)  T(F): 98 (07 Aug 2017 04:00), Max: 207.5 (06 Aug 2017 17:45)  HR: 81 (07 Aug 2017 04:00) (81 - 110)  BP: 136/85 (07 Aug 2017 04:00) (124/69 - 155/74)  BP(mean): --  RR: 17 (07 Aug 2017 04:00) (16 - 20)  SpO2: 99% (07 Aug 2017 04:00) (94% - 100%)    Gen: NAD  Breast: Soft, nontender, not engorged.  Abdomen: Soft, nontender, no distension , firm uterine fundus at umbilicus.  Pelvic: Normal lochia noted  Ext: No calf tenderness    LABS:                        10.1   18.21 )-----------( 223      ( 06 Aug 2017 16:45 )             27.7       Rubella status:     Allergies    No Known Allergies    Intolerances      MEDICATIONS  (STANDING):  magnesium sulfate Infusion 1.5 Gm/Hr (37.5 mL/Hr) IV Continuous <Continuous>  labetalol 200 milliGRAM(s) Oral two times a day  lactated ringers. 1000 milliLiter(s) (62.5 mL/Hr) IV Continuous <Continuous>  sodium chloride 0.9% lock flush 3 milliLiter(s) IV Push every 8 hours  diphtheria/tetanus/pertussis (acellular) Vaccine (ADAcel) 0.5 milliLiter(s) IntraMuscular once  prenatal multivitamin 1 Tablet(s) Oral daily  oxyCODONE    IR 5 milliGRAM(s) Oral every 3 hours  levothyroxine 37.5 MICROGram(s) Oral daily  magnesium sulfate Infusion 1.5 Gm/Hr (37.5 mL/Hr) IV Continuous <Continuous>  acetaminophen   Tablet. 975 milliGRAM(s) Oral every 6 hours  ibuprofen  Tablet 600 milliGRAM(s) Oral every 6 hours    MEDICATIONS  (PRN):  dibucaine 1% Ointment 1 Application(s) Topical every 4 hours PRN Perineal Discomfort  lanolin Ointment 1 Application(s) Topical every 6 hours PRN Sore Nipples  witch hazel Pads 1 Application(s) Topical every 4 hours PRN Perineal Discomfort  simethicone 80 milliGRAM(s) Chew every 6 hours PRN Gas  diphenhydrAMINE   Capsule 25 milliGRAM(s) Oral every 6 hours PRN Itching  glycerin Suppository - Adult 1 Suppository(s) Rectal at bedtime PRN Constipation  magnesium hydroxide Suspension 30 milliLiter(s) Oral two times a day PRN Constipation  docusate sodium 100 milliGRAM(s) Oral two times a day PRN Stool Softening  oxyCODONE    IR 5 milliGRAM(s) Oral every 4 hours PRN Severe Pain (7 -10)  pramoxine 1%/zinc 5% Cream 1 Application(s) Topical every 4 hours PRN perineal discomfort  hydrocortisone 1% Cream 1 Application(s) Topical every 4 hours PRN perineal discomfort        Assessment and Plan  PPD #1 s/p   Doing well.  Encourage ambulation.  PP & PPD Instructions reviewed.  [facility].  D/C home tomorrow. .

## 2017-08-08 VITALS — HEART RATE: 78 BPM | DIASTOLIC BLOOD PRESSURE: 96 MMHG | SYSTOLIC BLOOD PRESSURE: 143 MMHG

## 2017-08-08 RX ORDER — ACETAMINOPHEN 500 MG
2 TABLET ORAL
Qty: 0 | Refills: 0 | DISCHARGE
Start: 2017-08-08

## 2017-08-08 RX ADMIN — Medication 200 MILLIGRAM(S): at 10:44

## 2017-08-08 RX ADMIN — Medication 600 MILLIGRAM(S): at 06:11

## 2017-08-08 RX ADMIN — Medication 37.5 MICROGRAM(S): at 06:11

## 2017-08-08 RX ADMIN — Medication 600 MILLIGRAM(S): at 07:25

## 2017-08-08 RX ADMIN — SODIUM CHLORIDE 3 MILLILITER(S): 9 INJECTION INTRAMUSCULAR; INTRAVENOUS; SUBCUTANEOUS at 06:11

## 2017-08-08 NOTE — PROGRESS NOTE ADULT - SUBJECTIVE AND OBJECTIVE BOX
Patient seen and examined at bedside, no acute overnight events. No acute complaints, pain well controlled. Patient is ambulating, voiding spontaneously, passing gas, and tolerating regular diet.     Vital Signs Last 24 Hours  T(C): 36.6 (08-08-17 @ 06:19), Max: 36.6 (08-07-17 @ 10:00)  HR: 67 (08-08-17 @ 06:19) (67 - 84)  BP: 140/88 (08-08-17 @ 06:19) (120/69 - 142/71)  RR: 18 (08-08-17 @ 06:19) (18 - 20)  SpO2: 98% (08-08-17 @ 06:19) (97% - 99%)    Physical Exam:  General: NAD  Abdomen: Soft, non-tender, non-distended, fundus firm  Pelvic: Lochia wnl    Labs:    Blood Type: A Positive  RPR: Negative               10.1   18.21 )-----------( 223      ( 08-06 @ 16:45 )             27.7                12.2   21.69 )-----------( 241      ( 08-06 @ 09:15 )             34.4                13.2   18.56 )-----------( 268      ( 08-06 @ 01:30 )             37.6         MEDICATIONS  (STANDING):  labetalol 200 milliGRAM(s) Oral two times a day  lactated ringers. 1000 milliLiter(s) (62.5 mL/Hr) IV Continuous <Continuous>  sodium chloride 0.9% lock flush 3 milliLiter(s) IV Push every 8 hours  diphtheria/tetanus/pertussis (acellular) Vaccine (ADAcel) 0.5 milliLiter(s) IntraMuscular once  prenatal multivitamin 1 Tablet(s) Oral daily  oxyCODONE    IR 5 milliGRAM(s) Oral every 3 hours  levothyroxine 37.5 MICROGram(s) Oral daily  acetaminophen   Tablet. 975 milliGRAM(s) Oral every 6 hours  ibuprofen  Tablet 600 milliGRAM(s) Oral every 6 hours    MEDICATIONS  (PRN):  dibucaine 1% Ointment 1 Application(s) Topical every 4 hours PRN Perineal Discomfort  lanolin Ointment 1 Application(s) Topical every 6 hours PRN Sore Nipples  witch hazel Pads 1 Application(s) Topical every 4 hours PRN Perineal Discomfort  simethicone 80 milliGRAM(s) Chew every 6 hours PRN Gas  diphenhydrAMINE   Capsule 25 milliGRAM(s) Oral every 6 hours PRN Itching  glycerin Suppository - Adult 1 Suppository(s) Rectal at bedtime PRN Constipation  magnesium hydroxide Suspension 30 milliLiter(s) Oral two times a day PRN Constipation  docusate sodium 100 milliGRAM(s) Oral two times a day PRN Stool Softening  oxyCODONE    IR 5 milliGRAM(s) Oral every 4 hours PRN Severe Pain (7 -10)  pramoxine 1%/zinc 5% Cream 1 Application(s) Topical every 4 hours PRN perineal discomfort  hydrocortisone 1% Cream 1 Application(s) Topical every 4 hours PRN perineal discomfort Patient seen and examined at bedside, no acute overnight events. No acute complaints, pain well controlled. Pt denies dizziness, CP, palpitations, or weakness. Patient is ambulating, voiding spontaneously, passing gas, and tolerating regular diet.     Vital Signs Last 24 Hours  T(C): 36.6 (08-08-17 @ 06:19), Max: 36.6 (08-07-17 @ 10:00)  HR: 67 (08-08-17 @ 06:19) (67 - 84)  BP: 140/88 (08-08-17 @ 06:19) (120/69 - 142/71)  RR: 18 (08-08-17 @ 06:19) (18 - 20)  SpO2: 98% (08-08-17 @ 06:19) (97% - 99%)    Physical Exam:  General: NAD  Abdomen: Soft, non-tender, non-distended, fundus firm  Pelvic: Lochia wnl    Labs:    Blood Type: A Positive  RPR: Negative               10.1   18.21 )-----------( 223      ( 08-06 @ 16:45 )             27.7                12.2   21.69 )-----------( 241      ( 08-06 @ 09:15 )             34.4                13.2   18.56 )-----------( 268      ( 08-06 @ 01:30 )             37.6         MEDICATIONS  (STANDING):  labetalol 200 milliGRAM(s) Oral two times a day  lactated ringers. 1000 milliLiter(s) (62.5 mL/Hr) IV Continuous <Continuous>  sodium chloride 0.9% lock flush 3 milliLiter(s) IV Push every 8 hours  diphtheria/tetanus/pertussis (acellular) Vaccine (ADAcel) 0.5 milliLiter(s) IntraMuscular once  prenatal multivitamin 1 Tablet(s) Oral daily  oxyCODONE    IR 5 milliGRAM(s) Oral every 3 hours  levothyroxine 37.5 MICROGram(s) Oral daily  acetaminophen   Tablet. 975 milliGRAM(s) Oral every 6 hours  ibuprofen  Tablet 600 milliGRAM(s) Oral every 6 hours    MEDICATIONS  (PRN):  dibucaine 1% Ointment 1 Application(s) Topical every 4 hours PRN Perineal Discomfort  lanolin Ointment 1 Application(s) Topical every 6 hours PRN Sore Nipples  witch hazel Pads 1 Application(s) Topical every 4 hours PRN Perineal Discomfort  simethicone 80 milliGRAM(s) Chew every 6 hours PRN Gas  diphenhydrAMINE   Capsule 25 milliGRAM(s) Oral every 6 hours PRN Itching  glycerin Suppository - Adult 1 Suppository(s) Rectal at bedtime PRN Constipation  magnesium hydroxide Suspension 30 milliLiter(s) Oral two times a day PRN Constipation  docusate sodium 100 milliGRAM(s) Oral two times a day PRN Stool Softening  oxyCODONE    IR 5 milliGRAM(s) Oral every 4 hours PRN Severe Pain (7 -10)  pramoxine 1%/zinc 5% Cream 1 Application(s) Topical every 4 hours PRN perineal discomfort  hydrocortisone 1% Cream 1 Application(s) Topical every 4 hours PRN perineal discomfort

## 2017-08-08 NOTE — PROVIDER CONTACT NOTE (MEDICATION) - RECOMMENDATIONS
MD states to have patient taken labetalol 200mg TID going home, and follow up in MD's office in 1 week for BP check

## 2017-08-08 NOTE — PROGRESS NOTE ADULT - ASSESSMENT
26y/o  PPD#2 from [facility] in stable condition. No significant PMH. Current issues include severe preeclampsia s/p Mg, currently on labetolol. Discharge per attending.

## 2017-08-08 NOTE — PROVIDER CONTACT NOTE (MEDICATION) - ACTION/TREATMENT ORDERED:
RN educated pt on sign and symptom of preeclampsia and patient verbalized understanding and is ready to be discharged to home.

## 2017-08-08 NOTE — PROGRESS NOTE ADULT - SUBJECTIVE AND OBJECTIVE BOX
S:  RONIT SIMPSON is a 27y Female [facility] vaginal delivery and PEC Day 2.        She is comfortable and offers no compliants.    O:  T(C): 36.6 (08-08-17 @ 06:19), Max: 36.6 (08-07-17 @ 20:00)  HR: 67 (08-08-17 @ 06:19) (67 - 84)  BP: 140/88 (08-08-17 @ 06:19) (120/69 - 142/71)  RR: 18 (08-08-17 @ 06:19) (18 - 20)  SpO2: 98% (08-08-17 @ 06:19) (97% - 99%)  Wt(kg): --                                 10.1   18.21 )-----------( 223      ( 06 Aug 2017 16:45 )             27.7               Blood type:Type + Screen (08.05.17 @ 13:30)    ABO Interpretation: A    Rh Interpretation: Positive    Antibody Screen: Negative                                  Rubella: Immune                General: alert and oriented           Breast:  soft, nontender,            Abdomin:  soft nontender, BS+, Flatus(+), BM(+)           Fundus:  firm, nontender, below the umbilicus           Extremities:  no edema, no cyanosis, normal reflexes           Lochia:  mild    A:  Stable, Postpartum    P:   RONIT SIMPSON will be discharged home today. She is given instructions:                            1. nothing per vagina-no tampons, douches, baths, swiming or sex for 6-8 weeks             2.  to take ibuprofen 600 mg every 6 hours for pain or cramps. (Can take two Tylenol 325mg tablets every 6 hours as an                    alternative pain medication-NO MORE THAN 4000mg of Tylenol over 24hours)                       3.  Call the office and make postpartum visit for 1-2 weeks; sooner if bleeding becomes heavier, or she has feelings of                  depression or anxiety or develops a fever greater than 100.4 F.                 To use abdominal binder while awake as needed                 Verbal discharge instructions d/w patient  - discharge summary to be  given to her on discharge for the hospital             4.  No driving x 2 weeks.              5.  Continue taking prenatal vitamins

## 2017-08-09 ENCOUNTER — EMERGENCY (EMERGENCY)
Facility: HOSPITAL | Age: 27
LOS: 1 days | Discharge: STILL ADMITTED OR ADMINISTRATIVE | End: 2017-08-09
Admitting: EMERGENCY MEDICINE

## 2017-08-09 ENCOUNTER — INPATIENT (INPATIENT)
Facility: HOSPITAL | Age: 27
LOS: 0 days | Discharge: HOME | End: 2017-08-10
Attending: OBSTETRICS & GYNECOLOGY | Admitting: OBSTETRICS & GYNECOLOGY

## 2017-08-09 ENCOUNTER — TRANSCRIPTION ENCOUNTER (OUTPATIENT)
Age: 27
End: 2017-08-09

## 2017-08-09 VITALS — RESPIRATION RATE: 18 BRPM | SYSTOLIC BLOOD PRESSURE: 147 MMHG | HEART RATE: 85 BPM | DIASTOLIC BLOOD PRESSURE: 96 MMHG

## 2017-08-09 VITALS
HEART RATE: 80 BPM | OXYGEN SATURATION: 97 % | TEMPERATURE: 98 F | RESPIRATION RATE: 18 BRPM | SYSTOLIC BLOOD PRESSURE: 167 MMHG | DIASTOLIC BLOOD PRESSURE: 105 MMHG

## 2017-08-09 DIAGNOSIS — O14.10 SEVERE PRE-ECLAMPSIA, UNSPECIFIED TRIMESTER: ICD-10-CM

## 2017-08-09 DIAGNOSIS — Z3A.00 WEEKS OF GESTATION OF PREGNANCY NOT SPECIFIED: ICD-10-CM

## 2017-08-09 DIAGNOSIS — O14.90 UNSPECIFIED PRE-ECLAMPSIA, UNSPECIFIED TRIMESTER: ICD-10-CM

## 2017-08-09 LAB
ALBUMIN SERPL ELPH-MCNC: 3.1 G/DL — LOW (ref 3.3–5)
ALP SERPL-CCNC: 111 U/L — SIGNIFICANT CHANGE UP (ref 40–120)
ALT FLD-CCNC: 49 U/L — HIGH (ref 4–33)
APPEARANCE UR: CLEAR — SIGNIFICANT CHANGE UP
APTT BLD: 28.5 SEC — SIGNIFICANT CHANGE UP (ref 27.5–37.4)
AST SERPL-CCNC: 50 U/L — HIGH (ref 4–32)
BACTERIA # UR AUTO: SIGNIFICANT CHANGE UP
BASOPHILS # BLD AUTO: 0.03 K/UL — SIGNIFICANT CHANGE UP (ref 0–0.2)
BASOPHILS NFR BLD AUTO: 0.3 % — SIGNIFICANT CHANGE UP (ref 0–2)
BILIRUB SERPL-MCNC: 0.3 MG/DL — SIGNIFICANT CHANGE UP (ref 0.2–1.2)
BILIRUB UR-MCNC: NEGATIVE — SIGNIFICANT CHANGE UP
BLOOD UR QL VISUAL: HIGH
BUN SERPL-MCNC: 19 MG/DL — SIGNIFICANT CHANGE UP (ref 7–23)
CALCIUM SERPL-MCNC: 9 MG/DL — SIGNIFICANT CHANGE UP (ref 8.4–10.5)
CHLORIDE SERPL-SCNC: 106 MMOL/L — SIGNIFICANT CHANGE UP (ref 98–107)
CO2 SERPL-SCNC: 22 MMOL/L — SIGNIFICANT CHANGE UP (ref 22–31)
COLOR SPEC: SIGNIFICANT CHANGE UP
CREAT ?TM UR-MCNC: 20.75 MG/DL — SIGNIFICANT CHANGE UP
CREAT SERPL-MCNC: 0.69 MG/DL — SIGNIFICANT CHANGE UP (ref 0.5–1.3)
EOSINOPHIL # BLD AUTO: 0.19 K/UL — SIGNIFICANT CHANGE UP (ref 0–0.5)
EOSINOPHIL NFR BLD AUTO: 1.7 % — SIGNIFICANT CHANGE UP (ref 0–6)
EPI CELLS # UR: SIGNIFICANT CHANGE UP
FIBRINOGEN PPP-MCNC: 623 MG/DL — HIGH (ref 310–510)
GLUCOSE SERPL-MCNC: 76 MG/DL — SIGNIFICANT CHANGE UP (ref 70–99)
GLUCOSE UR-MCNC: NEGATIVE — SIGNIFICANT CHANGE UP
HCT VFR BLD CALC: 26.8 % — LOW (ref 34.5–45)
HGB BLD-MCNC: 9.1 G/DL — LOW (ref 11.5–15.5)
IMM GRANULOCYTES # BLD AUTO: 0.15 # — SIGNIFICANT CHANGE UP
IMM GRANULOCYTES NFR BLD AUTO: 1.3 % — SIGNIFICANT CHANGE UP (ref 0–1.5)
INR BLD: 0.88 — SIGNIFICANT CHANGE UP (ref 0.88–1.17)
KETONES UR-MCNC: NEGATIVE — SIGNIFICANT CHANGE UP
LDH SERPL L TO P-CCNC: 265 U/L — HIGH (ref 135–225)
LEUKOCYTE ESTERASE UR-ACNC: SIGNIFICANT CHANGE UP
LYMPHOCYTES # BLD AUTO: 1.33 K/UL — SIGNIFICANT CHANGE UP (ref 1–3.3)
LYMPHOCYTES # BLD AUTO: 11.6 % — LOW (ref 13–44)
MCHC RBC-ENTMCNC: 30.3 PG — SIGNIFICANT CHANGE UP (ref 27–34)
MCHC RBC-ENTMCNC: 34 % — SIGNIFICANT CHANGE UP (ref 32–36)
MCV RBC AUTO: 89.3 FL — SIGNIFICANT CHANGE UP (ref 80–100)
MONOCYTES # BLD AUTO: 0.72 K/UL — SIGNIFICANT CHANGE UP (ref 0–0.9)
MONOCYTES NFR BLD AUTO: 6.3 % — SIGNIFICANT CHANGE UP (ref 2–14)
MUCOUS THREADS # UR AUTO: SIGNIFICANT CHANGE UP
NEUTROPHILS # BLD AUTO: 9.06 K/UL — HIGH (ref 1.8–7.4)
NEUTROPHILS NFR BLD AUTO: 78.8 % — HIGH (ref 43–77)
NITRITE UR-MCNC: NEGATIVE — SIGNIFICANT CHANGE UP
NRBC # FLD: 0 — SIGNIFICANT CHANGE UP
PH UR: 7 — SIGNIFICANT CHANGE UP (ref 5–8)
PLATELET # BLD AUTO: 272 K/UL — SIGNIFICANT CHANGE UP (ref 150–400)
PMV BLD: 10 FL — SIGNIFICANT CHANGE UP (ref 7–13)
POTASSIUM SERPL-MCNC: 4.8 MMOL/L — SIGNIFICANT CHANGE UP (ref 3.5–5.3)
POTASSIUM SERPL-SCNC: 4.8 MMOL/L — SIGNIFICANT CHANGE UP (ref 3.5–5.3)
PROT SERPL-MCNC: 5.9 G/DL — LOW (ref 6–8.3)
PROT UR-MCNC: 4.1 MG/DL — SIGNIFICANT CHANGE UP
PROT UR-MCNC: NEGATIVE — SIGNIFICANT CHANGE UP
PROTHROM AB SERPL-ACNC: 9.8 SEC — SIGNIFICANT CHANGE UP (ref 9.8–13.1)
RBC # BLD: 3 M/UL — LOW (ref 3.8–5.2)
RBC # FLD: 13.5 % — SIGNIFICANT CHANGE UP (ref 10.3–14.5)
RBC CASTS # UR COMP ASSIST: SIGNIFICANT CHANGE UP (ref 0–?)
SODIUM SERPL-SCNC: 142 MMOL/L — SIGNIFICANT CHANGE UP (ref 135–145)
SP GR SPEC: 1.01 — SIGNIFICANT CHANGE UP (ref 1–1.03)
URATE SERPL-MCNC: 7.5 MG/DL — HIGH (ref 2.5–7)
UROBILINOGEN FLD QL: NORMAL E.U. — SIGNIFICANT CHANGE UP (ref 0.2–1)
WBC # BLD: 11.48 K/UL — HIGH (ref 3.8–10.5)
WBC # FLD AUTO: 11.48 K/UL — HIGH (ref 3.8–10.5)
WBC UR QL: HIGH (ref 0–?)

## 2017-08-09 RX ORDER — NIFEDIPINE 30 MG
30 TABLET, EXTENDED RELEASE 24 HR ORAL DAILY
Qty: 0 | Refills: 0 | Status: DISCONTINUED | OUTPATIENT
Start: 2017-08-09 | End: 2017-08-10

## 2017-08-09 RX ORDER — NIFEDIPINE 30 MG
30 TABLET, EXTENDED RELEASE 24 HR ORAL ONCE
Qty: 0 | Refills: 0 | Status: COMPLETED | OUTPATIENT
Start: 2017-08-09 | End: 2017-08-09

## 2017-08-09 RX ORDER — SODIUM CHLORIDE 9 MG/ML
1000 INJECTION, SOLUTION INTRAVENOUS
Qty: 0 | Refills: 0 | Status: DISCONTINUED | OUTPATIENT
Start: 2017-08-09 | End: 2017-08-10

## 2017-08-09 RX ORDER — HYDRALAZINE HCL 50 MG
5 TABLET ORAL ONCE
Qty: 0 | Refills: 0 | Status: COMPLETED | OUTPATIENT
Start: 2017-08-09 | End: 2017-08-09

## 2017-08-09 RX ORDER — LABETALOL HCL 100 MG
200 TABLET ORAL EVERY 8 HOURS
Qty: 0 | Refills: 0 | Status: DISCONTINUED | OUTPATIENT
Start: 2017-08-09 | End: 2017-08-09

## 2017-08-09 RX ORDER — LABETALOL HCL 100 MG
300 TABLET ORAL EVERY 8 HOURS
Qty: 0 | Refills: 0 | Status: DISCONTINUED | OUTPATIENT
Start: 2017-08-09 | End: 2017-08-10

## 2017-08-09 RX ORDER — LEVETIRACETAM 250 MG/1
500 TABLET, FILM COATED ORAL
Qty: 0 | Refills: 0 | Status: DISCONTINUED | OUTPATIENT
Start: 2017-08-09 | End: 2017-08-10

## 2017-08-09 RX ORDER — MAGNESIUM SULFATE 500 MG/ML
2 VIAL (ML) INJECTION
Qty: 40 | Refills: 0 | Status: DISCONTINUED | OUTPATIENT
Start: 2017-08-09 | End: 2017-08-10

## 2017-08-09 RX ORDER — MAGNESIUM SULFATE 500 MG/ML
4 VIAL (ML) INJECTION ONCE
Qty: 0 | Refills: 0 | Status: DISCONTINUED | OUTPATIENT
Start: 2017-08-09 | End: 2017-08-09

## 2017-08-09 RX ADMIN — Medication 30 MILLIGRAM(S): at 02:47

## 2017-08-09 RX ADMIN — LEVETIRACETAM 500 MILLIGRAM(S): 250 TABLET, FILM COATED ORAL at 08:58

## 2017-08-09 RX ADMIN — LEVETIRACETAM 500 MILLIGRAM(S): 250 TABLET, FILM COATED ORAL at 21:06

## 2017-08-09 RX ADMIN — Medication 200 MILLIGRAM(S): at 08:01

## 2017-08-09 RX ADMIN — Medication 5 MILLIGRAM(S): at 02:28

## 2017-08-09 RX ADMIN — Medication 200 MILLIGRAM(S): at 16:09

## 2017-08-09 NOTE — PROGRESS NOTE ADULT - SUBJECTIVE AND OBJECTIVE BOX
INTERVAL HPI/OVERNIGHT EVENTS: Pt seen and examined at bedside.  Pt complains of ___________________________.  She denies _______________________________________    MEDICATIONS  (STANDING):  labetalol 200 milliGRAM(s) Oral every 8 hours  NIFEdipine XL 30 milliGRAM(s) Oral daily  magnesium sulfate Infusion 2 Gm/Hr (50 mL/Hr) IV Continuous <Continuous>  lactated ringers. 1000 milliLiter(s) (50 mL/Hr) IV Continuous <Continuous>  levETIRAcetam 500 milliGRAM(s) Oral two times a day      Vital Signs Last 24 Hrs  T(C): 37 (09 Aug 2017 09:59), Max: 37 (09 Aug 2017 05:00)  T(F): 98.6 (09 Aug 2017 09:59), Max: 98.6 (09 Aug 2017 05:00)  HR: 90 (09 Aug 2017 09:59) (75 - 90)  BP: 131/72 (09 Aug 2017 09:59) (131/72 - 170/103)  BP(mean): 101 (09 Aug 2017 09:30) (92 - 120)  RR: 18 (09 Aug 2017 09:59) (16 - 20)  SpO2: 98% (09 Aug 2017 09:59) (91% - 98%)    PHYSICAL EXAM:    GA: NAD, A+0 x 3  Abd: ( + ) BS, soft, nontender, nondistended, no rebound or guarding,       LABS:                        9.1    11.48 )-----------( 272      ( 09 Aug 2017 02:25 )             26.8         142  |  106  |  19  ----------------------------<  76  4.8   |  22  |  0.69    Ca    9.0      09 Aug 2017 02:25    TPro  5.9<L>  /  Alb  3.1<L>  /  TBili  0.3  /  DBili  x   /  AST  50<H>  /  ALT  49<H>  /  AlkPhos  111      PT/INR - ( 09 Aug 2017 02:25 )   PT: 9.8 SEC;   INR: 0.88          PTT - ( 09 Aug 2017 02:25 )  PTT:28.5 SEC  Urinalysis Basic - ( 09 Aug 2017 02:20 )    Color: x / Appearance: CLEAR / S.007 / pH: 7.0  Gluc: NEGATIVE / Ketone: NEGATIVE  / Bili: NEGATIVE / Urobili: NORMAL E.U.   Blood: MODERATE / Protein: NEGATIVE / Nitrite: NEGATIVE   Leuk Esterase: TRACE / RBC: 25-50 / WBC 5-10   Sq Epi: x / Non Sq Epi: OCC / Bacteria: FEW      PPD#3 readmitted with severe range BP in the setting of delivery for PEC with severe features.  BP stabilized with Labetalol 300 TID and Procardia 30xl  -Given patient repeatedly mag toxic during previous admission will administer keppra for seizure ppx  -Contine BP monitoring  -Continue anti-hypertensive regimen INTERVAL HPI/OVERNIGHT EVENTS: Pt seen and examined at bedside.  Patient denies symptoms of PEC.  Resting comfortably in bed.      MEDICATIONS  (STANDING):  labetalol 200 milliGRAM(s) Oral every 8 hours  NIFEdipine XL 30 milliGRAM(s) Oral daily  magnesium sulfate Infusion 2 Gm/Hr (50 mL/Hr) IV Continuous <Continuous>  lactated ringers. 1000 milliLiter(s) (50 mL/Hr) IV Continuous <Continuous>  levETIRAcetam 500 milliGRAM(s) Oral two times a day      Vital Signs Last 24 Hrs  T(C): 37 (09 Aug 2017 09:59), Max: 37 (09 Aug 2017 05:00)  T(F): 98.6 (09 Aug 2017 09:59), Max: 98.6 (09 Aug 2017 05:00)  HR: 90 (09 Aug 2017 09:59) (75 - 90)  BP: 131/72 (09 Aug 2017 09:59) (131/72 - 170/103)  BP(mean): 101 (09 Aug 2017 09:30) (92 - 120)  RR: 18 (09 Aug 2017 09:59) (16 - 20)  SpO2: 98% (09 Aug 2017 09:59) (91% - 98%)    PHYSICAL EXAM:    GA: NAD, A+0 x 3  Abd: ( + ) BS, soft, nontender, nondistended, no rebound or guarding,       LABS:                        9.1    11.48 )-----------( 272      ( 09 Aug 2017 02:25 )             26.8         142  |  106  |  19  ----------------------------<  76  4.8   |  22  |  0.69    Ca    9.0      09 Aug 2017 02:25    TPro  5.9<L>  /  Alb  3.1<L>  /  TBili  0.3  /  DBili  x   /  AST  50<H>  /  ALT  49<H>  /  AlkPhos  111      PT/INR - ( 09 Aug 2017 02:25 )   PT: 9.8 SEC;   INR: 0.88          PTT - ( 09 Aug 2017 02:25 )  PTT:28.5 SEC  Urinalysis Basic - ( 09 Aug 2017 02:20 )    Color: x / Appearance: CLEAR / S.007 / pH: 7.0  Gluc: NEGATIVE / Ketone: NEGATIVE  / Bili: NEGATIVE / Urobili: NORMAL E.U.   Blood: MODERATE / Protein: NEGATIVE / Nitrite: NEGATIVE   Leuk Esterase: TRACE / RBC: 25-50 / WBC 5-10   Sq Epi: x / Non Sq Epi: OCC / Bacteria: FEW      PPD#3 readmitted with severe range BP in the setting of delivery for PEC with severe features.  BP stabilized with Labetalol 300 TID and Procardia 30xl  -Given patient repeatedly mag toxic during previous admission will administer keppra for seizure ppx  -Contine BP monitoring  -Continue anti-hypertensive regimen

## 2017-08-09 NOTE — H&P ADULT - PROBLEM SELECTOR PLAN 1
- HELLP labs sent and Procardia 30 XL given.  -due to severe range BP x2, pt received hydralazine 5 mg IVP x1  -will continue to monitor BPs

## 2017-08-09 NOTE — H&P ADULT - HISTORY OF PRESENT ILLNESS
26 yo  s/p VAVD on  c/b sPEC s/p Magnesium, who presents with elevated BPs at home. Patient went home with labetalol 200 mg TID when discharged on . Patient took BP at home which was 150/90 after labetalol. She says she has stable SOB since she delivered. Cannot take deep breath in per patient. Denies HA, changes in vision and RUQ pain. 26 yo  s/p VAVD on  c/b sPEC s/p Magnesium, who presents with elevated BPs at home. Patient went home with labetalol 200 mg TID when discharged on . Patient took BP at home which was 150/90 after labetalol. She says she has stable SOB since she delivered. Cannot take deep breath in per patient. Denies HA, changes in vision and RUQ pain. Denies CP.

## 2017-08-09 NOTE — DISCHARGE NOTE ADULT - CARE PROVIDER_API CALL
Db Chester), Obstetrics and Gynecology  [address] [place] [address]  Phone: [phone]  Fax: [phone]

## 2017-08-09 NOTE — DISCHARGE NOTE ADULT - PLAN OF CARE
wellness follow up with MD in one week. Take bps daily and call for pressures greater than 150 systolic or 100 diastolic.  call for headaches that do not resolve with tylenol or visual changes.

## 2017-08-09 NOTE — DISCHARGE NOTE ADULT - CARE PLAN
Principal Discharge DX:	Pre-eclampsia, unspecified trimester  Goal:	wellness  Instructions for follow-up, activity and diet:	follow up with MD in one week. Take bps daily and call for pressures greater than 150 systolic or 100 diastolic.  call for headaches that do not resolve with tylenol or visual changes.

## 2017-08-09 NOTE — ED ADULT TRIAGE NOTE - CHIEF COMPLAINT QUOTE
pt presents c/o high blood pressure reports she was just discharged from maternity, 2 days postpartum, discharged at 1pm today. pt c/o sob, tremors, appears lethargic and b/l foot swelling. denies ha or blurred vision at this time, endorses having those symptoms earlier today. pt reports she was diagnosed with preeclampsia. spoke with Sera from L&D pt to go upstairs to L&D. PMHX: preeclampsia, hypothyroid

## 2017-08-09 NOTE — DISCHARGE NOTE ADULT - MEDICATION SUMMARY - MEDICATIONS TO TAKE
I will START or STAY ON the medications listed below when I get home from the hospital:    acetaminophen 325 mg oral tablet  -- 2 tab(s) by mouth every 4 hours  -- Indication: For Pain    labetalol 300 mg oral tablet  -- 1 tab(s) by mouth every 8 hours MDD:3 tabs  -- Indication: For Severe pre-eclampsia    NIFEdipine 30 mg oral tablet, extended release  -- 1 tab(s) by mouth once a day  -- Indication: For Severe pre-eclampsia    levothyroxine 25 mcg (0.025 mg) oral tablet  -- 1 tab(s) by mouth once a day  -- Indication: For Hypothyroidism

## 2017-08-09 NOTE — PROVIDER CONTACT NOTE (OTHER) - ASSESSMENT
pt states she felt "shakey and out of breath" and took her blood pressure at home and it was around 170s/100s. pt denies blurry vision, epigrastric pain and headache

## 2017-08-09 NOTE — DISCHARGE NOTE ADULT - HOSPITAL COURSE
Patient admitted with postpartum preeclampsia, status post VAVD on 8/6/17.  Started on Keppra due to magnesium toxicity when previously treated with magnesium immediately postpartum.  Received labetalol and procardia for BP control.      Following 24 hours of magnesium and good pressure control patient was discharged home for close outpatient follow up.

## 2017-08-09 NOTE — CHART NOTE - NSCHARTNOTEFT_GEN_A_CORE
Pt in PACU for evaluation of PP elevated BPs at home. First BP severe with following 150/90s. Asymptomatic except for stable SOB since delivery. Discussed with Dr. Salmeron. Plan for Procardia 30 mg XL and HELLP labs to be sent. Pt had two severe range /100 x2. Will give hydralazine 5 mg x1 at this time. Full H&P to follow.    Selvin pgy3

## 2017-08-09 NOTE — DISCHARGE NOTE ADULT - PATIENT PORTAL LINK FT
“You can access the FollowHealth Patient Portal, offered by [facility], by registering with the following website: [email or web address]”

## 2017-08-10 VITALS
SYSTOLIC BLOOD PRESSURE: 132 MMHG | RESPIRATION RATE: 14 BRPM | HEART RATE: 72 BPM | DIASTOLIC BLOOD PRESSURE: 79 MMHG | OXYGEN SATURATION: 100 %

## 2017-08-10 LAB
ALBUMIN SERPL ELPH-MCNC: 3.2 G/DL — LOW (ref 3.3–5)
ALP SERPL-CCNC: 107 U/L — SIGNIFICANT CHANGE UP (ref 40–120)
ALT FLD-CCNC: 87 U/L — HIGH (ref 4–33)
APTT BLD: 28.9 SEC — SIGNIFICANT CHANGE UP (ref 27.5–37.4)
AST SERPL-CCNC: 56 U/L — HIGH (ref 4–32)
BASOPHILS # BLD AUTO: 0.04 K/UL — SIGNIFICANT CHANGE UP (ref 0–0.2)
BASOPHILS NFR BLD AUTO: 0.4 % — SIGNIFICANT CHANGE UP (ref 0–2)
BILIRUB SERPL-MCNC: 0.4 MG/DL — SIGNIFICANT CHANGE UP (ref 0.2–1.2)
BUN SERPL-MCNC: 12 MG/DL — SIGNIFICANT CHANGE UP (ref 7–23)
CALCIUM SERPL-MCNC: 9 MG/DL — SIGNIFICANT CHANGE UP (ref 8.4–10.5)
CHLORIDE SERPL-SCNC: 107 MMOL/L — SIGNIFICANT CHANGE UP (ref 98–107)
CO2 SERPL-SCNC: 20 MMOL/L — LOW (ref 22–31)
CREAT SERPL-MCNC: 0.64 MG/DL — SIGNIFICANT CHANGE UP (ref 0.5–1.3)
EOSINOPHIL # BLD AUTO: 0.15 K/UL — SIGNIFICANT CHANGE UP (ref 0–0.5)
EOSINOPHIL NFR BLD AUTO: 1.6 % — SIGNIFICANT CHANGE UP (ref 0–6)
FIBRINOGEN PPP-MCNC: 502.6 MG/DL — SIGNIFICANT CHANGE UP (ref 310–510)
GLUCOSE SERPL-MCNC: 71 MG/DL — SIGNIFICANT CHANGE UP (ref 70–99)
HCT VFR BLD CALC: 29.4 % — LOW (ref 34.5–45)
HGB BLD-MCNC: 10 G/DL — LOW (ref 11.5–15.5)
IMM GRANULOCYTES # BLD AUTO: 0.18 # — SIGNIFICANT CHANGE UP
IMM GRANULOCYTES NFR BLD AUTO: 2 % — HIGH (ref 0–1.5)
INR BLD: 0.93 — SIGNIFICANT CHANGE UP (ref 0.88–1.17)
LDH SERPL L TO P-CCNC: 230 U/L — HIGH (ref 135–225)
LYMPHOCYTES # BLD AUTO: 1.71 K/UL — SIGNIFICANT CHANGE UP (ref 1–3.3)
LYMPHOCYTES # BLD AUTO: 18.7 % — SIGNIFICANT CHANGE UP (ref 13–44)
MCHC RBC-ENTMCNC: 30.1 PG — SIGNIFICANT CHANGE UP (ref 27–34)
MCHC RBC-ENTMCNC: 34 % — SIGNIFICANT CHANGE UP (ref 32–36)
MCV RBC AUTO: 88.6 FL — SIGNIFICANT CHANGE UP (ref 80–100)
MONOCYTES # BLD AUTO: 0.64 K/UL — SIGNIFICANT CHANGE UP (ref 0–0.9)
MONOCYTES NFR BLD AUTO: 7 % — SIGNIFICANT CHANGE UP (ref 2–14)
NEUTROPHILS # BLD AUTO: 6.41 K/UL — SIGNIFICANT CHANGE UP (ref 1.8–7.4)
NEUTROPHILS NFR BLD AUTO: 70.3 % — SIGNIFICANT CHANGE UP (ref 43–77)
NRBC # FLD: 0 — SIGNIFICANT CHANGE UP
PLATELET # BLD AUTO: 313 K/UL — SIGNIFICANT CHANGE UP (ref 150–400)
PMV BLD: 9.3 FL — SIGNIFICANT CHANGE UP (ref 7–13)
POTASSIUM SERPL-MCNC: 4 MMOL/L — SIGNIFICANT CHANGE UP (ref 3.5–5.3)
POTASSIUM SERPL-SCNC: 4 MMOL/L — SIGNIFICANT CHANGE UP (ref 3.5–5.3)
PROT SERPL-MCNC: 6.2 G/DL — SIGNIFICANT CHANGE UP (ref 6–8.3)
PROTHROM AB SERPL-ACNC: 10.4 SEC — SIGNIFICANT CHANGE UP (ref 9.8–13.1)
RBC # BLD: 3.32 M/UL — LOW (ref 3.8–5.2)
RBC # FLD: 13.2 % — SIGNIFICANT CHANGE UP (ref 10.3–14.5)
SODIUM SERPL-SCNC: 140 MMOL/L — SIGNIFICANT CHANGE UP (ref 135–145)
URATE SERPL-MCNC: 6.9 MG/DL — SIGNIFICANT CHANGE UP (ref 2.5–7)
WBC # BLD: 9.13 K/UL — SIGNIFICANT CHANGE UP (ref 3.8–10.5)
WBC # FLD AUTO: 9.13 K/UL — SIGNIFICANT CHANGE UP (ref 3.8–10.5)

## 2017-08-10 RX ORDER — LEVOTHYROXINE SODIUM 125 MCG
1 TABLET ORAL
Qty: 0 | Refills: 0 | DISCHARGE
Start: 2017-08-10

## 2017-08-10 RX ORDER — LEVOTHYROXINE SODIUM 125 MCG
25 TABLET ORAL DAILY
Qty: 0 | Refills: 0 | Status: DISCONTINUED | OUTPATIENT
Start: 2017-08-10 | End: 2017-08-10

## 2017-08-10 RX ORDER — LABETALOL HCL 100 MG
1 TABLET ORAL
Qty: 63 | Refills: 0
Start: 2017-08-10 | End: 2017-08-31

## 2017-08-10 RX ORDER — NIFEDIPINE 30 MG
1 TABLET, EXTENDED RELEASE 24 HR ORAL
Qty: 21 | Refills: 0
Start: 2017-08-10 | End: 2017-08-31

## 2017-08-10 RX ADMIN — Medication 25 MICROGRAM(S): at 08:50

## 2017-08-10 RX ADMIN — Medication 30 MILLIGRAM(S): at 02:07

## 2017-08-10 RX ADMIN — Medication 300 MILLIGRAM(S): at 00:19

## 2017-08-10 RX ADMIN — Medication 300 MILLIGRAM(S): at 16:05

## 2017-08-10 RX ADMIN — LEVETIRACETAM 500 MILLIGRAM(S): 250 TABLET, FILM COATED ORAL at 08:11

## 2017-08-10 RX ADMIN — Medication 300 MILLIGRAM(S): at 08:11

## 2017-08-10 NOTE — PROGRESS NOTE ADULT - SUBJECTIVE AND OBJECTIVE BOX
INTERVAL HPI/OVERNIGHT EVENTS: Pt seen and examined at bedside.  She denies any complaints. -HA, -blurry vision, -SOB, -RUQ pain.    MEDICATIONS  (STANDING):  NIFEdipine XL 30 milliGRAM(s) Oral daily  labetalol 300 milliGRAM(s) Oral every 8 hours  levothyroxine 25 MICROGram(s) Oral daily    MEDICATIONS  (PRN):      Vital Signs Last 24 Hrs  T(C): 36.7 (10 Aug 2017 11:56), Max: 37.1 (09 Aug 2017 18:07)  T(F): 98 (10 Aug 2017 11:56), Max: 98.8 (09 Aug 2017 18:07)  HR: 70 (10 Aug 2017 11:56) (70 - 96)  BP: 139/85 (10 Aug 2017 11:56) (129/80 - 152/87)  BP(mean): --  RR: 18 (10 Aug 2017 11:56) (17 - 18)  SpO2: 99% (10 Aug 2017 11:56) (98% - 100%)    PHYSICAL EXAM:    GA: NAD, A+0 x 3  Abd: ( + ) BS, soft, nontender, nondistended, no rebound or guarding,     LABS:                        10.0   9.13  )-----------( 313      ( 10 Aug 2017 05:42 )             29.4     08-10    140  |  107  |  12  ----------------------------<  71  4.0   |  20<L>  |  0.64    Ca    9.0      10 Aug 2017 05:42    TPro  6.2  /  Alb  3.2<L>  /  TBili  0.4  /  DBili  x   /  AST  56<H>  /  ALT  87<H>  /  AlkPhos  107  08-10    PT/INR - ( 10 Aug 2017 05:42 )   PT: 10.4 SEC;   INR: 0.93          PTT - ( 10 Aug 2017 05:42 )  PTT:28.9 SEC  Urinalysis Basic - ( 09 Aug 2017 02:20 )    Color: x / Appearance: CLEAR / S.007 / pH: 7.0  Gluc: NEGATIVE / Ketone: NEGATIVE  / Bili: NEGATIVE / Urobili: NORMAL E.U.   Blood: MODERATE / Protein: NEGATIVE / Nitrite: NEGATIVE   Leuk Esterase: TRACE / RBC: 25-50 / WBC 5-10   Sq Epi: x / Non Sq Epi: OCC / Bacteria: FEW        RADIOLOGY & ADDITIONAL TESTS:

## 2017-08-10 NOTE — PROGRESS NOTE ADULT - ASSESSMENT
A/P  27yF admit for PP sPEC s/p VAVD 8/6, admitted for Keppra, asymptomatic with BPs under improved control   Neuro: Denies pain  CV: Hemodynamically stable, continue labetalol 300 TID BPs are 130-140/70-80.  Last dose of keppra due at 9am  Pulm: Oxygenating well on room air, continue IS  GI: Tolerating reg diet  : Adequate UOP, continue to monitor I's/O's  ID: afebrile, WBC stable  Heme: DVT prophylaxis with early ambulation and SCDs      Anamika Olmos PGY3  [id number] A/P  27yF admit for PP sPEC s/p VAVD 8/6, admitted for Keppra, asymptomatic with BPs under improved control   Neuro: Denies pain  CV: Hemodynamically stable, continue labetalol 300 TID BPs are 130-140/70-80.  Last dose of keppra due at 9am.  LFTs elevated trend 50/49->56/87, will discuss with MFM.  Pulm: Oxygenating well on room air, continue IS  GI: Tolerating reg diet.    : Adequate UOP, continue to monitor I's/O's  ID: afebrile, WBC stable  Heme: DVT prophylaxis with early ambulation and SCDs      Anamika Olmos PGY3  [id number]

## 2017-08-10 NOTE — DISCHARGE NOTE OB - MEDICATION SUMMARY - MEDICATIONS TO TAKE
I will START or STAY ON the medications listed below when I get home from the hospital:    acetaminophen 325 mg oral tablet  -- 2 tab(s) by mouth every 4 hours  -- Indication: For as needed postpartum pain    labetalol 300 mg oral tablet  -- 1 tab(s) by mouth every 8 hours MDD:3 tabs  -- Indication: For Postpartum preeclampsia    NIFEdipine 30 mg oral tablet, extended release  -- 1 tab(s) by mouth once a day  -- Indication: For Postpartum preeclampsia    levothyroxine 25 mcg (0.025 mg) oral tablet  -- 1 tab(s) by mouth once a day  -- Indication: For Home med

## 2017-08-10 NOTE — DISCHARGE NOTE OB - PATIENT PORTAL LINK FT
“You can access the Follow[employer] Patient Portal, offered by [facility], by registering with the following website: [email or web address]”

## 2017-08-10 NOTE — DISCHARGE NOTE OB - PLAN OF CARE
wellness Call private obgyn for visual changes, headaches that do not resolve with tylenol or pressure 160/100.

## 2017-08-10 NOTE — DISCHARGE NOTE OB - CARE PROVIDER_API CALL
Db Chester), Obstetrics and Gynecology  [address] [place] [address]  Phone: [phone]  Fax: [phone]

## 2017-08-10 NOTE — PROGRESS NOTE ADULT - SUBJECTIVE AND OBJECTIVE BOX
Subjective  Patient evaluated at bedside. No events overnight.  Yesterday evening labetalol increased to 300 TID due to BPs consistantly in 150s/90s.  Pt denies HA, visual changes, RUQ/epigastric pain and edema.  No vb or abdominal pain.  Tyrese PO, ambulating, no fevers or chills.    NIFEdipine XL 30 milliGRAM(s) Oral daily  magnesium sulfate Infusion 2 Gm/Hr IV Continuous <Continuous>  lactated ringers. 1000 milliLiter(s) IV Continuous <Continuous>  levETIRAcetam 500 milliGRAM(s) Oral two times a day  labetalol 300 milliGRAM(s) Oral every 8 hours      Objective  Physical exam  VS: T(C): 36.9 (08-10-17 @ 05:21), Max: 36.9 (08-09-17 @ 22:12)  HR: 85 (08-10-17 @ 05:21) (72 - 85)  BP: 142/82 (08-10-17 @ 05:21) (138/79 - 145/85)  RR: 17 (08-10-17 @ 05:21) (17 - 18)  SpO2: 99% (08-10-17 @ 05:21) (99% - 100%)  Gen: No acute distress, alert and oriented  CV: Regular rate and rhythm  Pulm: Clear to ascultation bilaterally  Abd: soft nt nd no epigastric tenderness  Ext: nontender bilaterally, SCDs in place      08-09 @ 07:01  -  08-10 @ 07:00  --------------------------------------------------------  IN: 700 mL / OUT: 300 mL / NET: 400 mL Subjective  Patient evaluated at bedside. No events overnight.  Yesterday evening labetalol increased to 300 TID due to BPs consistantly in 150s/90s.  Pt denies HA, visual changes, RUQ/epigastric pain and edema.  No vb or abdominal pain.  Tyrese PO, ambulating, no fevers or chills.    NIFEdipine XL 30 milliGRAM(s) Oral daily  magnesium sulfate Infusion 2 Gm/Hr IV Continuous <Continuous>  lactated ringers. 1000 milliLiter(s) IV Continuous <Continuous>  levETIRAcetam 500 milliGRAM(s) Oral two times a day  labetalol 300 milliGRAM(s) Oral every 8 hours      Objective  Physical exam  VS: T(C): 36.9 (08-10-17 @ 05:21), Max: 36.9 (08-09-17 @ 22:12)  HR: 85 (08-10-17 @ 05:21) (72 - 85)  BP: 142/82 (08-10-17 @ 05:21) (138/79 - 145/85)  RR: 17 (08-10-17 @ 05:21) (17 - 18)  SpO2: 99% (08-10-17 @ 05:21) (99% - 100%)  Gen: No acute distress, alert and oriented  CV: Regular rate and rhythm  Pulm: Clear to ascultation bilaterally  Abd: soft nt nd no epigastric tenderness  Ext: nontender bilaterally, SCDs in place      08-09 @ 07:01  -  08-10 @ 07:00  --------------------------------------------------------  IN: 700 mL / OUT: 300 mL / NET: 400 mL                              10.0   9.13  )-----------( 313      ( 10 Aug 2017 05:42 )             29.4     08-10    140  |  107  |  12  ----------------------------<  71  4.0   |  20<L>  |  0.64  08-09    142  |  106  |  19  ----------------------------<  76  4.8   |  22  |  0.69    Ca    9.0      10 Aug 2017 05:42  Ca    9.0      09 Aug 2017 02:25    TPro  6.2  /  Alb  3.2<L>  /  TBili  0.4  /  DBili  x   /  AST  56<H>  /  ALT  87<H>  /  AlkPhos  107  08-10

## 2017-08-10 NOTE — DISCHARGE NOTE OB - CARE PLAN
Principal Discharge DX:	Pre-eclampsia, unspecified trimester  Goal:	wellness  Instructions for follow-up, activity and diet:	Call private obgyn for visual changes, headaches that do not resolve with tylenol or pressure 160/100.

## 2017-08-10 NOTE — DISCHARGE NOTE OB - HOSPITAL COURSE
Patient admitted with postpartum preeclampsia, status post _________________.  Started on magnesium, received labetalol for BP control.      Following 24 hours of magnesium and good pressure control patient was discharged home for close outpatient follow up.

## 2017-08-10 NOTE — PROGRESS NOTE ADULT - ASSESSMENT
A/P - s/p VAVD with PP PEC (PPD#4)  - Continue BP monitoring  - Continue Labetalol 300mg TID, Procardia 30xl  - Plan for D/C this evening if BPs stable  - Repeat LFTs at next office visit

## 2017-08-18 LAB — SURGICAL PATHOLOGY STUDY: SIGNIFICANT CHANGE UP

## 2018-02-27 PROBLEM — Z00.00 ENCOUNTER FOR PREVENTIVE HEALTH EXAMINATION: Status: ACTIVE | Noted: 2018-02-27

## 2018-03-09 ENCOUNTER — APPOINTMENT (OUTPATIENT)
Dept: OBGYN | Facility: CLINIC | Age: 28
End: 2018-03-09

## 2019-03-22 PROBLEM — E03.9 HYPOTHYROIDISM, UNSPECIFIED: Chronic | Status: ACTIVE | Noted: 2017-08-09

## 2019-03-26 ENCOUNTER — APPOINTMENT (OUTPATIENT)
Dept: OBGYN | Facility: CLINIC | Age: 29
End: 2019-03-26
Payer: MEDICAID

## 2019-03-26 VITALS
WEIGHT: 132 LBS | DIASTOLIC BLOOD PRESSURE: 72 MMHG | HEIGHT: 60 IN | SYSTOLIC BLOOD PRESSURE: 130 MMHG | BODY MASS INDEX: 25.91 KG/M2

## 2019-03-26 DIAGNOSIS — Z82.49 FAMILY HISTORY OF ISCHEMIC HEART DISEASE AND OTHER DISEASES OF THE CIRCULATORY SYSTEM: ICD-10-CM

## 2019-03-26 PROCEDURE — 36415 COLL VENOUS BLD VENIPUNCTURE: CPT

## 2019-03-26 PROCEDURE — 99385 PREV VISIT NEW AGE 18-39: CPT

## 2019-03-26 PROCEDURE — 81025 URINE PREGNANCY TEST: CPT

## 2019-03-26 RX ORDER — LEVOFLOXACIN 500 MG/1
500 TABLET, FILM COATED ORAL
Qty: 10 | Refills: 0 | Status: COMPLETED | COMMUNITY
Start: 2018-10-30

## 2019-03-26 RX ORDER — MONTELUKAST 10 MG/1
10 TABLET, FILM COATED ORAL
Qty: 30 | Refills: 0 | Status: COMPLETED | COMMUNITY
Start: 2018-12-24

## 2019-03-26 RX ORDER — AMOXICILLIN AND CLAVULANATE POTASSIUM 875; 125 MG/1; MG/1
875-125 TABLET, COATED ORAL
Qty: 28 | Refills: 0 | Status: COMPLETED | COMMUNITY
Start: 2018-12-10

## 2019-03-26 RX ORDER — FLUCONAZOLE 150 MG/1
150 TABLET ORAL
Qty: 1 | Refills: 0 | Status: COMPLETED | COMMUNITY
Start: 2019-01-31

## 2019-03-26 RX ORDER — METHYLPREDNISOLONE 4 MG/1
4 TABLET ORAL
Qty: 21 | Refills: 0 | Status: COMPLETED | COMMUNITY
Start: 2018-12-10

## 2019-03-26 NOTE — PHYSICAL EXAM
[Awake] : awake [Alert] : alert [Acute Distress] : no acute distress [LAD] : no lymphadenopathy [Thyroid Nodule] : no thyroid nodule [Goiter] : no goiter [Mass] : no breast mass [Nipple Discharge] : no nipple discharge [Axillary LAD] : no axillary lymphadenopathy [Soft] : soft [Tender] : non tender [Oriented x3] : oriented to person, place, and time [Normal] : uterus [No Bleeding] : there was no active vaginal bleeding [Discharge] : had a ~M discharge [Moderate] : moderate [White] : white [Thick] : thick [Pap Obtained] : a Pap smear was performed [Uterine Adnexae] : were not tender and not enlarged

## 2019-03-27 LAB
HCG SERPL-MCNC: 108 MIU/ML
HCG UR QL: NEGATIVE
TSH SERPL-ACNC: 3.56 UIU/ML

## 2019-03-28 LAB — HCG SERPL-MCNC: 258 MIU/ML

## 2019-03-29 LAB
C TRACH RRNA SPEC QL NAA+PROBE: NOT DETECTED
HPV HIGH+LOW RISK DNA PNL CVX: NOT DETECTED
N GONORRHOEA RRNA SPEC QL NAA+PROBE: NOT DETECTED
SOURCE AMPLIFICATION: NORMAL
SOURCE TP AMPLIFICATION: NORMAL
T VAGINALIS RRNA SPEC QL NAA+PROBE: NOT DETECTED

## 2019-04-01 LAB
CYTOLOGY CVX/VAG DOC THIN PREP: NORMAL
HCG SERPL-MCNC: 644 MIU/ML

## 2019-04-19 ENCOUNTER — APPOINTMENT (OUTPATIENT)
Dept: OBGYN | Facility: CLINIC | Age: 29
End: 2019-04-19
Payer: COMMERCIAL

## 2019-04-19 VITALS
BODY MASS INDEX: 26.11 KG/M2 | HEIGHT: 60 IN | DIASTOLIC BLOOD PRESSURE: 80 MMHG | WEIGHT: 133 LBS | SYSTOLIC BLOOD PRESSURE: 120 MMHG

## 2019-04-19 DIAGNOSIS — Z87.59 PERSONAL HISTORY OF OTHER COMPLICATIONS OF PREGNANCY, CHILDBIRTH AND THE PUERPERIUM: ICD-10-CM

## 2019-04-19 LAB
BILIRUB UR QL STRIP: NORMAL
GLUCOSE UR-MCNC: NORMAL
HCG UR QL: 0.2 EU/DL
HCG UR QL: POSITIVE
HGB UR QL STRIP.AUTO: NORMAL
KETONES UR-MCNC: NORMAL
LEUKOCYTE ESTERASE UR QL STRIP: NORMAL
NITRITE UR QL STRIP: NORMAL
PH UR STRIP: 6
PROT UR STRIP-MCNC: NORMAL
QUALITY CONTROL: YES
SP GR UR STRIP: 1.02

## 2019-04-19 PROCEDURE — 81025 URINE PREGNANCY TEST: CPT

## 2019-04-19 PROCEDURE — 76817 TRANSVAGINAL US OBSTETRIC: CPT

## 2019-04-19 PROCEDURE — 81003 URINALYSIS AUTO W/O SCOPE: CPT | Mod: QW

## 2019-04-19 PROCEDURE — 0501F PRENATAL FLOW SHEET: CPT

## 2019-04-19 RX ORDER — PRENATAL NO.52/IRON/FA/DHA 28-1-200MG
28-1-215.8 CAPSULE ORAL
Qty: 30 | Refills: 11 | Status: DISCONTINUED | COMMUNITY
Start: 2019-04-19 | End: 2019-04-19

## 2019-04-19 RX ORDER — FLUCONAZOLE 150 MG/1
150 TABLET ORAL
Qty: 1 | Refills: 6 | Status: DISCONTINUED | COMMUNITY
Start: 2019-03-26 | End: 2019-04-19

## 2019-04-20 ENCOUNTER — NON-APPOINTMENT (OUTPATIENT)
Age: 29
End: 2019-04-20

## 2019-04-20 PROBLEM — Z87.59 HISTORY OF SEVERE PRE-ECLAMPSIA: Status: RESOLVED | Noted: 2019-04-20 | Resolved: 2019-04-20

## 2019-04-22 LAB
BACTERIA UR CULT: NORMAL
C TRACH RRNA SPEC QL NAA+PROBE: NOT DETECTED
N GONORRHOEA RRNA SPEC QL NAA+PROBE: NOT DETECTED
SOURCE AMPLIFICATION: NORMAL
SOURCE AMPLIFICATION: NORMAL
T VAGINALIS RRNA SPEC QL NAA+PROBE: NOT DETECTED

## 2019-05-07 ENCOUNTER — EMERGENCY (EMERGENCY)
Facility: HOSPITAL | Age: 29
LOS: 1 days | Discharge: HOME | End: 2019-05-07
Attending: STUDENT IN AN ORGANIZED HEALTH CARE EDUCATION/TRAINING PROGRAM | Admitting: STUDENT IN AN ORGANIZED HEALTH CARE EDUCATION/TRAINING PROGRAM
Payer: COMMERCIAL

## 2019-05-07 VITALS
RESPIRATION RATE: 16 BRPM | TEMPERATURE: 98 F | HEART RATE: 81 BPM | DIASTOLIC BLOOD PRESSURE: 92 MMHG | SYSTOLIC BLOOD PRESSURE: 140 MMHG | OXYGEN SATURATION: 100 %

## 2019-05-07 LAB
ALBUMIN SERPL ELPH-MCNC: 4.2 G/DL — SIGNIFICANT CHANGE UP (ref 3.3–5)
ALP SERPL-CCNC: 43 U/L — SIGNIFICANT CHANGE UP (ref 40–120)
ALT FLD-CCNC: 12 U/L — SIGNIFICANT CHANGE UP (ref 4–33)
ANION GAP SERPL CALC-SCNC: 14 MMO/L — SIGNIFICANT CHANGE UP (ref 7–14)
APPEARANCE UR: SIGNIFICANT CHANGE UP
APTT BLD: 29.8 SEC — SIGNIFICANT CHANGE UP (ref 27.5–36.3)
AST SERPL-CCNC: 16 U/L — SIGNIFICANT CHANGE UP (ref 4–32)
BACTERIA # UR AUTO: NEGATIVE — SIGNIFICANT CHANGE UP
BASOPHILS # BLD AUTO: 0.05 K/UL — SIGNIFICANT CHANGE UP (ref 0–0.2)
BASOPHILS NFR BLD AUTO: 0.4 % — SIGNIFICANT CHANGE UP (ref 0–2)
BILIRUB SERPL-MCNC: 0.2 MG/DL — SIGNIFICANT CHANGE UP (ref 0.2–1.2)
BILIRUB UR-MCNC: NEGATIVE — SIGNIFICANT CHANGE UP
BLD GP AB SCN SERPL QL: NEGATIVE — SIGNIFICANT CHANGE UP
BLOOD UR QL VISUAL: HIGH
BUN SERPL-MCNC: 7 MG/DL — SIGNIFICANT CHANGE UP (ref 7–23)
CALCIUM SERPL-MCNC: 9.4 MG/DL — SIGNIFICANT CHANGE UP (ref 8.4–10.5)
CHLORIDE SERPL-SCNC: 106 MMOL/L — SIGNIFICANT CHANGE UP (ref 98–107)
CO2 SERPL-SCNC: 19 MMOL/L — LOW (ref 22–31)
COLOR SPEC: SIGNIFICANT CHANGE UP
CREAT SERPL-MCNC: 0.5 MG/DL — SIGNIFICANT CHANGE UP (ref 0.5–1.3)
EOSINOPHIL # BLD AUTO: 0.26 K/UL — SIGNIFICANT CHANGE UP (ref 0–0.5)
EOSINOPHIL NFR BLD AUTO: 2 % — SIGNIFICANT CHANGE UP (ref 0–6)
GLUCOSE SERPL-MCNC: 79 MG/DL — SIGNIFICANT CHANGE UP (ref 70–99)
GLUCOSE UR-MCNC: NEGATIVE — SIGNIFICANT CHANGE UP
HCG SERPL-ACNC: SIGNIFICANT CHANGE UP MIU/ML
HCT VFR BLD CALC: 35.4 % — SIGNIFICANT CHANGE UP (ref 34.5–45)
HGB BLD-MCNC: 12.3 G/DL — SIGNIFICANT CHANGE UP (ref 11.5–15.5)
HYALINE CASTS # UR AUTO: NEGATIVE — SIGNIFICANT CHANGE UP
IMM GRANULOCYTES NFR BLD AUTO: 0.5 % — SIGNIFICANT CHANGE UP (ref 0–1.5)
INR BLD: 0.96 — SIGNIFICANT CHANGE UP (ref 0.88–1.17)
KETONES UR-MCNC: NEGATIVE — SIGNIFICANT CHANGE UP
LEUKOCYTE ESTERASE UR-ACNC: NEGATIVE — SIGNIFICANT CHANGE UP
LYMPHOCYTES # BLD AUTO: 2.59 K/UL — SIGNIFICANT CHANGE UP (ref 1–3.3)
LYMPHOCYTES # BLD AUTO: 20.3 % — SIGNIFICANT CHANGE UP (ref 13–44)
MCHC RBC-ENTMCNC: 28.8 PG — SIGNIFICANT CHANGE UP (ref 27–34)
MCHC RBC-ENTMCNC: 34.7 % — SIGNIFICANT CHANGE UP (ref 32–36)
MCV RBC AUTO: 82.9 FL — SIGNIFICANT CHANGE UP (ref 80–100)
MONOCYTES # BLD AUTO: 0.63 K/UL — SIGNIFICANT CHANGE UP (ref 0–0.9)
MONOCYTES NFR BLD AUTO: 4.9 % — SIGNIFICANT CHANGE UP (ref 2–14)
NEUTROPHILS # BLD AUTO: 9.2 K/UL — HIGH (ref 1.8–7.4)
NEUTROPHILS NFR BLD AUTO: 71.9 % — SIGNIFICANT CHANGE UP (ref 43–77)
NITRITE UR-MCNC: NEGATIVE — SIGNIFICANT CHANGE UP
NRBC # FLD: 0 K/UL — SIGNIFICANT CHANGE UP (ref 0–0)
PH UR: 6 — SIGNIFICANT CHANGE UP (ref 5–8)
PLATELET # BLD AUTO: 334 K/UL — SIGNIFICANT CHANGE UP (ref 150–400)
PMV BLD: 9.6 FL — SIGNIFICANT CHANGE UP (ref 7–13)
POTASSIUM SERPL-MCNC: 3.8 MMOL/L — SIGNIFICANT CHANGE UP (ref 3.5–5.3)
POTASSIUM SERPL-SCNC: 3.8 MMOL/L — SIGNIFICANT CHANGE UP (ref 3.5–5.3)
PROT SERPL-MCNC: 7.1 G/DL — SIGNIFICANT CHANGE UP (ref 6–8.3)
PROT UR-MCNC: 10 — SIGNIFICANT CHANGE UP
PROTHROM AB SERPL-ACNC: 10.6 SEC — SIGNIFICANT CHANGE UP (ref 9.8–13.1)
RBC # BLD: 4.27 M/UL — SIGNIFICANT CHANGE UP (ref 3.8–5.2)
RBC # FLD: 13.8 % — SIGNIFICANT CHANGE UP (ref 10.3–14.5)
RBC CASTS # UR COMP ASSIST: >50 — HIGH (ref 0–?)
RH IG SCN BLD-IMP: POSITIVE — SIGNIFICANT CHANGE UP
SODIUM SERPL-SCNC: 139 MMOL/L — SIGNIFICANT CHANGE UP (ref 135–145)
SP GR SPEC: 1.02 — SIGNIFICANT CHANGE UP (ref 1–1.04)
SQUAMOUS # UR AUTO: SIGNIFICANT CHANGE UP
UROBILINOGEN FLD QL: NORMAL — SIGNIFICANT CHANGE UP
WBC # BLD: 12.79 K/UL — HIGH (ref 3.8–10.5)
WBC # FLD AUTO: 12.79 K/UL — HIGH (ref 3.8–10.5)
WBC UR QL: SIGNIFICANT CHANGE UP (ref 0–?)

## 2019-05-07 PROCEDURE — 99284 EMERGENCY DEPT VISIT MOD MDM: CPT

## 2019-05-07 PROCEDURE — 76817 TRANSVAGINAL US OBSTETRIC: CPT | Mod: 26

## 2019-05-07 RX ORDER — SODIUM CHLORIDE 9 MG/ML
3 INJECTION INTRAMUSCULAR; INTRAVENOUS; SUBCUTANEOUS ONCE
Qty: 0 | Refills: 0 | Status: COMPLETED | OUTPATIENT
Start: 2019-05-07 | End: 2019-05-07

## 2019-05-07 RX ADMIN — SODIUM CHLORIDE 3 MILLILITER(S): 9 INJECTION INTRAMUSCULAR; INTRAVENOUS; SUBCUTANEOUS at 22:47

## 2019-05-07 NOTE — ED ADULT TRIAGE NOTE - CHIEF COMPLAINT QUOTE
pt , 9 weeks, 5 days pregnant with twins, c/o heavy vaginal bleeding x2 hrs after getting off a flight, LMP . pt denies pain/cramping. PMH- Preeclampsia w previous pregnancies.

## 2019-05-07 NOTE — ED ADULT NURSE NOTE - OBJECTIVE STATEMENT
Pt with  at bedside. Pt st" I just go off plane was waiting for cab when I had a gush of blood....I am 9weeks 5 days pregnant with twins I did have spotting during the weeks 4-7 . I have hx of thyroid disease and preeclampsia with first delivery but I am not on any meds my blood pressure has been normal 3 months following my last delivery. " Pt bp elevated 130's/97. Pt st" I am extremely anxious right now....." Pt denies trauma , denies headaches, vision changes, denies swelling of hands or feet, no swelling noted... pt st" I have just very mild abd cramping 1/10....really nothing."  Pt positioned for comfort, sl and labs obtained and sent. Handoff rpt to Primary RN Luisa to follow.

## 2019-05-08 ENCOUNTER — TRANSCRIPTION ENCOUNTER (OUTPATIENT)
Age: 29
End: 2019-05-08

## 2019-05-08 VITALS
SYSTOLIC BLOOD PRESSURE: 133 MMHG | DIASTOLIC BLOOD PRESSURE: 88 MMHG | TEMPERATURE: 98 F | HEART RATE: 79 BPM | RESPIRATION RATE: 16 BRPM | OXYGEN SATURATION: 100 %

## 2019-05-08 DIAGNOSIS — O41.8X90 OTHER SPECIFIED DISORDERS OF AMNIOTIC FLUID AND MEMBRANES, UNSPECIFIED TRIMESTER, NOT APPLICABLE OR UNSPECIFIED: ICD-10-CM

## 2019-05-08 NOTE — ED PROVIDER NOTE - PHYSICAL EXAMINATION
GENERAL: NAD  HEAD:  Atraumatic, Normocephalic  EYES: EOMI, conjunctiva and sclera clear  NECK: Supple, No JVD  CHEST/LUNG: Clear to auscultation bilaterally; No wheeze  HEART: Regular rate and rhythm; No murmurs, rubs, or gallops  ABDOMEN: Soft, Nontender, Nondistended; Bowel sounds present  EXTREMITIES:  2+ Peripheral Pulses, No clubbing, cyanosis, or edema  PSYCH: AAOx3  NEUROLOGY: non-focal  SKIN: No rashes or lesions

## 2019-05-08 NOTE — ED PROVIDER NOTE - CLINICAL SUMMARY MEDICAL DECISION MAKING FREE TEXT BOX
Gravid female presents to ED for evaluation of vaginal bleeding. Will check labs, US, hydrate, consult Ob

## 2019-05-08 NOTE — ED PROVIDER NOTE - NS ED ROS FT
CONSTITUTIONAL: No fevers or chills  EYES/ENT: No visual changes;  No  throat pain   NECK: No pain   RESPIRATORY: No cough, wheezing, hemoptysis; No shortness of breath  CARDIOVASCULAR: No chest pain or palpitations  GASTROINTESTINAL: No abdominal or epigastric pain. No nausea, vomiting, or hematemesis; No diarrhea or constipation. No melena or hematochezia.  GENITOURINARY: No dysuria, frequency or hematuria  GYN: Painless vaginal bleeding  NEUROLOGICAL: No numbness or weakness  SKIN: No itching, rashes

## 2019-05-08 NOTE — ED ADULT NURSE REASSESSMENT NOTE - NS ED NURSE REASSESS COMMENT FT1
pt awake, a/ox3, vitally stable in NAD, to be seen by OBGYN, pt comfortable in bed with family at bedside, will continue to monitor- denies any headache, visual changes, vaginal bleeding at this time

## 2019-05-08 NOTE — ED PROVIDER NOTE - PROGRESS NOTE DETAILS
Transvaginal US, OBGYN consult US with twin live intrauterine pregnancy. Moderate to large subchorionic hematoma. To be discharged to follow up with OBGYN outpatient

## 2019-05-08 NOTE — CONSULT NOTE ADULT - ASSESSMENT
A/P   29y   at 10w2d with TIUP  presents with c/o vaginal bleeding. TVUS confirmed live TIUP with moderate to large subchorionic hematoma 3.4x2.3x5.9cm. Vaginal bleeding likely 2/2 mod to large subchorionic hematoma vs threatened ab.

## 2019-05-08 NOTE — CONSULT NOTE ADULT - PROBLEM SELECTOR RECOMMENDATION 9
-Rh type: A+ .    No need for rhogam at this time.   -Vaginal bleeding precautions reviewed with patient: return to ED if heavy vaginal bleeding filling 2pp hr for 2 hr in a row, dizziness/lightheadedness, severe abdominal pain.   -Counselled pt on findings of subchorionic hematoma and association with bleeding as well as increased risk for miscarriage. Pelvic precautions given.   -Pt to follow up with OB Dr. Yan w/in 1wk  -patient stable for d/c home from GYN perspective.  Primary managment per ED team.      TRE Keith PGY2  d/w Dr. Quintana

## 2019-05-08 NOTE — ED PROVIDER NOTE - OBJECTIVE STATEMENT
29F PMH hypothyroidism,  , 9 weeks, 5 days pregnant with twins presenting with vaginal bleeding. Symptom started 2 hours before presenting to the ED with painless heavy vagina bleeding after getting off a flight.  LMP on . Denies nausea/vomiting, cramping, blurry vision. Pre-eclampsia with previous pregnancy.

## 2019-05-08 NOTE — CONSULT NOTE ADULT - SUBJECTIVE AND OBJECTIVE BOX
HPI:  29yp  with TIUP at 10w2d by LMP 19 p/w vaginal bleeding. Pt had onset of vaginal bleeding at 6p filling less than 2 pads and without passage of clots; bleeding slowed since 8p. Pt denies abdominal pain/cramping, dizziness/lightheadedness, nausea/vomiting, cp/sob. Pt has had vaginal spotting through out pregnancy since 3.5 wks with light spotting every other day since 5w. She as seen in OB office and told that vaginal spotting normal in TIUP. Pt last had intercourse 2d ago and noted VB the next morning which resolved until yesterday afternoon.       OB/GYN HISTORY:    status post  '17 complicated by sPEC/mg  GYN = [facility]  Last pap 2019 - nml with negative STI testing  denies h/o abmnml paps, STIs, endometriosis, fibroids   reg menses q 30d, vaginal bleeding 7d, 3ppd  sexually active with 1 male monogamous partbner    PAST MEDICAL & SURGICAL HISTORY:  Hypothyroidism  No significant past surgical history    Allergies  No Known Allergies    Intolerances    Meds: Synthroid 25mcg M-F, 50mcg weekend     FAMILY HISTORY:  No pertinent family history in first degree relatives      SOCIAL HISTORY:   denies etoh abuse, denies tobacco use, denies drug use.        Vital Signs Last 24 Hrs  T(C): 36.5 (08 May 2019 01:23), Max: 36.6 (07 May 2019 20:14)  T(F): 97.7 (08 May 2019 01:23), Max: 97.8 (07 May 2019 20:14)  HR: 81 (08 May 2019 01:23) (81 - 88)  BP: 134/90 (08 May 2019 01:23) (134/90 - 140/92)  BP(mean): --  RR: 15 (08 May 2019 01:23) (15 - 16)  SpO2: 100% (08 May 2019 01:23) (100% - 100%)    PHYSICAL EXAM:      Constitutional: alert and oriented x 3  Respiratory: clear to ascultation bilaterally   Cardiovascular: regular rate and rhythm, no murmur  Gastrointestinal: soft, non tender, non-distended,  no rebound/guarding, + bowel sounds. No organomegaly, no palpable masses  Genitourinary: Normal external female  exam  Cervix: closed/ long, no CMT  Vaginal: normal vaginal mucosa, scant dark red blood in vault, no active bleeding, no clots t, normal physiologic discharge  Uterus: Enlarged approx 11wk size, non tender  Adnexa: Non tender bilaterally, no palpable masses  Rectal: deferred  Extremities: Non-tender bilaterally, No edema  Neurological: Grossly intact      LABS:                        12.3   12.79 )-----------( 334      ( 07 May 2019 22:20 )             35.4         139  |  106  |  7   ----------------------------<  79  3.8   |  19<L>  |  0.50    Ca    9.4      07 May 2019 22:20    TPro  7.1  /  Alb  4.2  /  TBili  0.2  /  DBili  x   /  AST  16  /  ALT  12  /  AlkPhos  43      PT/INR - ( 07 May 2019 22:20 )   PT: 10.6 SEC;   INR: 0.96          PTT - ( 07 May 2019 22:20 )  PTT:29.8 SEC  Urinalysis Basic - ( 07 May 2019 22:20 )    Color: LIGHT YELLOW / Appearance: Lt TURBID / S.020 / pH: 6.0  Gluc: NEGATIVE / Ketone: NEGATIVE  / Bili: NEGATIVE / Urobili: NORMAL   Blood: LARGE / Protein: 10 / Nitrite: NEGATIVE   Leuk Esterase: NEGATIVE / RBC: >50 / WBC 0-2   Sq Epi: FEW / Non Sq Epi: x / Bacteria: NEGATIVE        Blood Type: A Positive      RADIOLOGY & ADDITIONAL STUDIES:    < from: US Transvaginal, OB (19 @ 23:43) >    EXAM:  US OB TRANSVAGINAL        PROCEDURE DATE:  May  7 2019     INTERPRETATION:  CLINICAL INFORMATION: 29-year-old female with vaginal   bleeding    LMP: 2019    Estimated Gestational Age by LMP: 10 weeks 1 day    COMPARISON: None available.    Endovaginal pelvic sonogram only. Color and Spectral Doppler was   performed.    FINDINGS:    Uterus: Gravid uterus with a monochorionic diamniotic twin pregnancy.   There is a moderate to large subchorionic hematoma measuring   approximately 3.4 x 2.3 x 5.9 cm. The cervix measures 3.7 cm.    Baby A:    Gestational Sac Size (mean): n/a    Crown Rump Length: 2.9 cm     Estimated Gestational Age: 9 weeks 5 days    Yolk Sac: Normal.    Fetal Heart Rate: 182 bpm    Baby B:    Gestational Sac Size (mean): n/a    Crown Rump Length: 3.5 cm     Estimated Gestational Age: 10 weeks 3 days    Yolk Sac: Normal.    Fetal Heart Rate: 176 bpm    Right ovary: 4.0 x 2.7 x 3.9 cm. . There is a 1.8 x 1.9 x 2.1 cm right   corpus luteum. Normal arterial and venous waveforms.    Left ovary: 3.6 x 2.2 x 1.7 cm. Normal arterial and venous waveforms.    Fluid: Trace pelvic free fluid.    IMPRESSION:    Twin live intrauterine pregnancy. Moderate to large subchorionic hematoma.  Estimated gestational age of baby A: 9 weeks 5 days, baby B: 10 weeks 3   days  Estimated due date of  2019              LAMBERT EPSTEIN M.D., Radiology Resident  This document has been electronically signed.  MARY KATE DUCKWORTH M.D., ATTENDING RADIOLOGIST  This document has been electronically signed. May  8 2019 12:40AM    < end of copied text >

## 2019-05-08 NOTE — ED PROVIDER NOTE - ATTENDING CONTRIBUTION TO CARE
30 yo gravid female presents to ED for evaluation of vaginal bleeding, arrived from an international flight today. patient denies any abd pain. Denies chest pain, shortness of breath, lightheadedness, nausea, vomiting.   LMP 2/25/19    Gen: no acute distress, well appearing, awake, alert and oriented x 3  Cardiac: regular rate and rhythm, +S1S2  Pulm: Clear to auscultation bilaterally  Abd: soft, nontender, nondistended, no guarding  Back: neg CVA ttp, nontender spine  Extremity: no edema, no deformity, warm and well perfused, FROM all extremities    Neuro: awake, alert, oriented x 3, sensorimotor intact    MDM  GRavid female with painless vaginal bleeding, will check labs, imaging, hydrate, consult Ob

## 2019-05-10 ENCOUNTER — NON-APPOINTMENT (OUTPATIENT)
Age: 29
End: 2019-05-10

## 2019-05-10 ENCOUNTER — APPOINTMENT (OUTPATIENT)
Dept: OBGYN | Facility: CLINIC | Age: 29
End: 2019-05-10
Payer: MEDICAID

## 2019-05-10 PROCEDURE — 76801 OB US < 14 WKS SINGLE FETUS: CPT

## 2019-05-10 PROCEDURE — 36415 COLL VENOUS BLD VENIPUNCTURE: CPT

## 2019-05-10 PROCEDURE — 0502F SUBSEQUENT PRENATAL CARE: CPT

## 2019-05-12 ENCOUNTER — NON-APPOINTMENT (OUTPATIENT)
Age: 29
End: 2019-05-12

## 2019-05-12 LAB
BILIRUB UR QL STRIP: NORMAL
GLUCOSE UR-MCNC: NORMAL
HBV SURFACE AG SERPL QL IA: NONREACTIVE
HCG UR QL: 0.2 EU/DL
HCV AB SER QL: NONREACTIVE
HCV S/CO RATIO: 0.1 S/CO
HGB UR QL STRIP.AUTO: NORMAL
HIV1+2 AB SPEC QL IA.RAPID: NONREACTIVE
KETONES UR-MCNC: NORMAL
LEAD BLD-MCNC: <1 UG/DL
LEUKOCYTE ESTERASE UR QL STRIP: NORMAL
MEV IGG FLD QL IA: >300 AU/ML
MEV IGG+IGM SER-IMP: POSITIVE
NITRITE UR QL STRIP: NORMAL
PH UR STRIP: 6
PROT UR STRIP-MCNC: NORMAL
RUBV IGG FLD-ACNC: 3.2 INDEX
RUBV IGG SER-IMP: POSITIVE
SP GR UR STRIP: 1.02
T PALLIDUM AB SER QL IA: NEGATIVE
TSH SERPL-ACNC: 6.7 UIU/ML
VZV AB TITR SER: POSITIVE
VZV IGG SER IF-ACNC: 1100 INDEX

## 2019-05-13 LAB
HGB A MFR BLD: 97.5 %
HGB A2 MFR BLD: 2.5 %
HGB FRACT BLD-IMP: NORMAL

## 2019-05-13 RX ORDER — LEVOTHYROXINE SODIUM 0.03 MG/1
25 TABLET ORAL
Qty: 30 | Refills: 0 | Status: DISCONTINUED | COMMUNITY
Start: 2018-10-12 | End: 2019-05-13

## 2019-05-15 LAB
B19V IGG SER QL IA: 6.4 INDEX
B19V IGG+IGM SER-IMP: NORMAL
B19V IGG+IGM SER-IMP: POSITIVE
B19V IGM FLD-ACNC: 0.2 INDEX
B19V IGM SER-ACNC: NEGATIVE

## 2019-05-17 ENCOUNTER — APPOINTMENT (OUTPATIENT)
Dept: OBGYN | Facility: CLINIC | Age: 29
End: 2019-05-17
Payer: MEDICAID

## 2019-05-17 ENCOUNTER — NON-APPOINTMENT (OUTPATIENT)
Age: 29
End: 2019-05-17

## 2019-05-17 VITALS
BODY MASS INDEX: 27.09 KG/M2 | DIASTOLIC BLOOD PRESSURE: 62 MMHG | HEIGHT: 60 IN | WEIGHT: 138 LBS | SYSTOLIC BLOOD PRESSURE: 120 MMHG

## 2019-05-17 DIAGNOSIS — O41.8X90 OTHER SPECIFIED DISORDERS OF AMNIOTIC FLUID AND MEMBRANES, UNSPECIFIED TRIMESTER, NOT APPLICABLE OR UNSPECIFIED: ICD-10-CM

## 2019-05-17 DIAGNOSIS — O46.8X9 OTHER SPECIFIED DISORDERS OF AMNIOTIC FLUID AND MEMBRANES, UNSPECIFIED TRIMESTER, NOT APPLICABLE OR UNSPECIFIED: ICD-10-CM

## 2019-05-17 LAB
BILIRUB UR QL STRIP: NORMAL
GLUCOSE UR-MCNC: NORMAL
HCG UR QL: 0.2 EU/DL
HGB UR QL STRIP.AUTO: NORMAL
KETONES UR-MCNC: NORMAL
LEUKOCYTE ESTERASE UR QL STRIP: NORMAL
NITRITE UR QL STRIP: NORMAL
PH UR STRIP: 7
PROT UR STRIP-MCNC: NORMAL
SP GR UR STRIP: 1.01

## 2019-05-17 PROCEDURE — 76801 OB US < 14 WKS SINGLE FETUS: CPT

## 2019-05-17 PROCEDURE — 0502F SUBSEQUENT PRENATAL CARE: CPT

## 2019-05-18 ENCOUNTER — NON-APPOINTMENT (OUTPATIENT)
Age: 29
End: 2019-05-18

## 2019-05-28 ENCOUNTER — APPOINTMENT (OUTPATIENT)
Dept: ANTEPARTUM | Facility: CLINIC | Age: 29
End: 2019-05-28
Payer: MEDICAID

## 2019-05-28 ENCOUNTER — ASOB RESULT (OUTPATIENT)
Age: 29
End: 2019-05-28

## 2019-05-28 PROCEDURE — 36416 COLLJ CAPILLARY BLOOD SPEC: CPT

## 2019-05-28 PROCEDURE — 76814 OB US NUCHAL MEAS ADD-ON: CPT

## 2019-05-28 PROCEDURE — 76813 OB US NUCHAL MEAS 1 GEST: CPT

## 2019-05-28 PROCEDURE — 76802 OB US < 14 WKS ADDL FETUS: CPT

## 2019-05-28 PROCEDURE — 76801 OB US < 14 WKS SINGLE FETUS: CPT

## 2019-06-06 ENCOUNTER — APPOINTMENT (OUTPATIENT)
Dept: ENDOCRINOLOGY | Facility: CLINIC | Age: 29
End: 2019-06-06
Payer: COMMERCIAL

## 2019-06-06 ENCOUNTER — LABORATORY RESULT (OUTPATIENT)
Age: 29
End: 2019-06-06

## 2019-06-06 VITALS
SYSTOLIC BLOOD PRESSURE: 125 MMHG | WEIGHT: 143 LBS | DIASTOLIC BLOOD PRESSURE: 80 MMHG | OXYGEN SATURATION: 95 % | HEART RATE: 91 BPM | TEMPERATURE: 98.9 F | HEIGHT: 60 IN | BODY MASS INDEX: 28.07 KG/M2

## 2019-06-06 PROCEDURE — 99203 OFFICE O/P NEW LOW 30 MIN: CPT | Mod: 25

## 2019-06-06 PROCEDURE — 36415 COLL VENOUS BLD VENIPUNCTURE: CPT

## 2019-06-06 RX ORDER — PNV NO.118/IRON FUMARATE/FA 29 MG-1 MG
TABLET,CHEWABLE ORAL DAILY
Qty: 30 | Refills: 11 | Status: DISCONTINUED | COMMUNITY
Start: 2019-04-19 | End: 2019-06-06

## 2019-06-11 ENCOUNTER — APPOINTMENT (OUTPATIENT)
Dept: ANTEPARTUM | Facility: CLINIC | Age: 29
End: 2019-06-11
Payer: COMMERCIAL

## 2019-06-11 ENCOUNTER — ASOB RESULT (OUTPATIENT)
Age: 29
End: 2019-06-11

## 2019-06-11 PROCEDURE — 76815 OB US LIMITED FETUS(S): CPT

## 2019-06-14 ENCOUNTER — NON-APPOINTMENT (OUTPATIENT)
Age: 29
End: 2019-06-14

## 2019-06-14 ENCOUNTER — APPOINTMENT (OUTPATIENT)
Dept: OBGYN | Facility: CLINIC | Age: 29
End: 2019-06-14
Payer: MEDICAID

## 2019-06-14 VITALS
BODY MASS INDEX: 27.88 KG/M2 | SYSTOLIC BLOOD PRESSURE: 144 MMHG | HEIGHT: 60 IN | DIASTOLIC BLOOD PRESSURE: 80 MMHG | WEIGHT: 142 LBS

## 2019-06-14 LAB
BILIRUB UR QL STRIP: NORMAL
GLUCOSE UR-MCNC: NORMAL
HCG UR QL: 0.2 EU/DL
HGB UR QL STRIP.AUTO: NORMAL
KETONES UR-MCNC: NORMAL
LEUKOCYTE ESTERASE UR QL STRIP: NORMAL
NITRITE UR QL STRIP: NORMAL
PH UR STRIP: 5.5
PROT UR STRIP-MCNC: NORMAL
SP GR UR STRIP: NORMAL

## 2019-06-14 PROCEDURE — 36415 COLL VENOUS BLD VENIPUNCTURE: CPT

## 2019-06-14 PROCEDURE — 0502F SUBSEQUENT PRENATAL CARE: CPT

## 2019-06-14 RX ORDER — VITAMIN C, CALCIUM, IRON, VITAMIN D3, VITAMIN E, THIAMIN, RIBOFLAVIN, NIACINAMIDE, VITAMIN B6, FOLIC ACID, IODINE, ZINC, COPPER, DOCUSATE SODIUM
27-1 & 250 KIT
Qty: 1 | Refills: 11 | Status: DISCONTINUED | COMMUNITY
Start: 2019-06-14 | End: 2019-06-14

## 2019-06-18 LAB
2ND TRIMESTER DATA: NORMAL
ADDENDUM DOC: NORMAL
AFP PNL SERPL: NORMAL
AFP SERPL-ACNC: NORMAL
CLINICAL BIOCHEMIST REVIEW: NORMAL
NOTES NTD: NORMAL

## 2019-06-25 ENCOUNTER — APPOINTMENT (OUTPATIENT)
Dept: ANTEPARTUM | Facility: CLINIC | Age: 29
End: 2019-06-25
Payer: COMMERCIAL

## 2019-06-25 ENCOUNTER — ASOB RESULT (OUTPATIENT)
Age: 29
End: 2019-06-25

## 2019-06-25 PROCEDURE — 76815 OB US LIMITED FETUS(S): CPT

## 2019-06-26 ENCOUNTER — RX RENEWAL (OUTPATIENT)
Age: 29
End: 2019-06-26

## 2019-06-27 LAB
ALBUMIN SERPL ELPH-MCNC: 4.2 G/DL
ALP BLD-CCNC: 47 U/L
ALT SERPL-CCNC: 15 U/L
ANION GAP SERPL CALC-SCNC: 13 MMOL/L
AST SERPL-CCNC: 16 U/L
BASOPHILS # BLD AUTO: 0.03 K/UL
BASOPHILS NFR BLD AUTO: 0.3 %
BILIRUB SERPL-MCNC: 0.4 MG/DL
BUN SERPL-MCNC: 10 MG/DL
CALCIUM SERPL-MCNC: 9.7 MG/DL
CHLORIDE SERPL-SCNC: 103 MMOL/L
CO2 SERPL-SCNC: 20 MMOL/L
CREAT SERPL-MCNC: 0.41 MG/DL
EOSINOPHIL # BLD AUTO: 0.24 K/UL
EOSINOPHIL NFR BLD AUTO: 2 %
GLUCOSE SERPL-MCNC: 71 MG/DL
HCT VFR BLD CALC: 37 %
HGB BLD-MCNC: 12.3 G/DL
IMM GRANULOCYTES NFR BLD AUTO: 0.8 %
LYMPHOCYTES # BLD AUTO: 1.81 K/UL
LYMPHOCYTES NFR BLD AUTO: 15.3 %
MAN DIFF?: NORMAL
MCHC RBC-ENTMCNC: 29.3 PG
MCHC RBC-ENTMCNC: 33.2 GM/DL
MCV RBC AUTO: 88.1 FL
MONOCYTES # BLD AUTO: 0.76 K/UL
MONOCYTES NFR BLD AUTO: 6.4 %
NEUTROPHILS # BLD AUTO: 8.87 K/UL
NEUTROPHILS NFR BLD AUTO: 75.2 %
PLATELET # BLD AUTO: 345 K/UL
POTASSIUM SERPL-SCNC: 4.1 MMOL/L
PROT SERPL-MCNC: 7.1 G/DL
RBC # BLD: 4.2 M/UL
RBC # FLD: 15 %
SODIUM SERPL-SCNC: 136 MMOL/L
T3FREE SERPL-MCNC: 3.21 PG/ML
T3RU NFR SERPL: 1.4 TBI
T4 SERPL-MCNC: 12.5 UG/DL
THYROGLOB AB SERPL-ACNC: <20 IU/ML
THYROPEROXIDASE AB SERPL IA-ACNC: 118 IU/ML
TSH SERPL-ACNC: 2.24 UIU/ML
WBC # FLD AUTO: 11.81 K/UL

## 2019-07-09 ENCOUNTER — ASOB RESULT (OUTPATIENT)
Age: 29
End: 2019-07-09

## 2019-07-09 ENCOUNTER — APPOINTMENT (OUTPATIENT)
Dept: ANTEPARTUM | Facility: CLINIC | Age: 29
End: 2019-07-09
Payer: COMMERCIAL

## 2019-07-09 PROCEDURE — 76815 OB US LIMITED FETUS(S): CPT

## 2019-07-15 ENCOUNTER — APPOINTMENT (OUTPATIENT)
Dept: ANTEPARTUM | Facility: CLINIC | Age: 29
End: 2019-07-15
Payer: COMMERCIAL

## 2019-07-15 ENCOUNTER — ASOB RESULT (OUTPATIENT)
Age: 29
End: 2019-07-15

## 2019-07-15 PROCEDURE — 76812 OB US DETAILED ADDL FETUS: CPT

## 2019-07-15 PROCEDURE — 76817 TRANSVAGINAL US OBSTETRIC: CPT

## 2019-07-15 PROCEDURE — 76811 OB US DETAILED SNGL FETUS: CPT

## 2019-07-16 ENCOUNTER — NON-APPOINTMENT (OUTPATIENT)
Age: 29
End: 2019-07-16

## 2019-07-16 ENCOUNTER — APPOINTMENT (OUTPATIENT)
Dept: OBGYN | Facility: CLINIC | Age: 29
End: 2019-07-16
Payer: COMMERCIAL

## 2019-07-16 VITALS — WEIGHT: 151 LBS | SYSTOLIC BLOOD PRESSURE: 130 MMHG | BODY MASS INDEX: 29.49 KG/M2 | DIASTOLIC BLOOD PRESSURE: 80 MMHG

## 2019-07-16 PROCEDURE — 0502F SUBSEQUENT PRENATAL CARE: CPT

## 2019-07-19 ENCOUNTER — OUTPATIENT (OUTPATIENT)
Dept: OUTPATIENT SERVICES | Age: 29
LOS: 1 days | Discharge: HOME | End: 2019-07-19

## 2019-07-19 ENCOUNTER — OTHER (OUTPATIENT)
Age: 29
End: 2019-07-19

## 2019-07-22 ENCOUNTER — APPOINTMENT (OUTPATIENT)
Dept: PEDIATRIC CARDIOLOGY | Facility: CLINIC | Age: 29
End: 2019-07-22
Payer: COMMERCIAL

## 2019-07-22 PROCEDURE — 76821 MIDDLE CEREBRAL ARTERY ECHO: CPT | Mod: 59

## 2019-07-22 PROCEDURE — 99201 OFFICE OUTPATIENT NEW 10 MINUTES: CPT | Mod: 25

## 2019-07-22 PROCEDURE — 93325 DOPPLER ECHO COLOR FLOW MAPG: CPT | Mod: 59

## 2019-07-22 PROCEDURE — 76820 UMBILICAL ARTERY ECHO: CPT | Mod: 59

## 2019-07-22 PROCEDURE — 76827 ECHO EXAM OF FETAL HEART: CPT | Mod: 59

## 2019-07-22 PROCEDURE — 76827 ECHO EXAM OF FETAL HEART: CPT

## 2019-07-22 PROCEDURE — 76825 ECHO EXAM OF FETAL HEART: CPT

## 2019-07-22 PROCEDURE — 76820 UMBILICAL ARTERY ECHO: CPT

## 2019-08-01 ENCOUNTER — APPOINTMENT (OUTPATIENT)
Dept: ANTEPARTUM | Facility: CLINIC | Age: 29
End: 2019-08-01
Payer: COMMERCIAL

## 2019-08-01 ENCOUNTER — ASOB RESULT (OUTPATIENT)
Age: 29
End: 2019-08-01

## 2019-08-01 PROCEDURE — 76817 TRANSVAGINAL US OBSTETRIC: CPT

## 2019-08-01 PROCEDURE — 76816 OB US FOLLOW-UP PER FETUS: CPT | Mod: 59

## 2019-08-13 ENCOUNTER — APPOINTMENT (OUTPATIENT)
Dept: OBGYN | Facility: CLINIC | Age: 29
End: 2019-08-13
Payer: COMMERCIAL

## 2019-08-13 ENCOUNTER — RESULT CHARGE (OUTPATIENT)
Age: 29
End: 2019-08-13

## 2019-08-13 ENCOUNTER — NON-APPOINTMENT (OUTPATIENT)
Age: 29
End: 2019-08-13

## 2019-08-13 VITALS
WEIGHT: 157 LBS | SYSTOLIC BLOOD PRESSURE: 124 MMHG | DIASTOLIC BLOOD PRESSURE: 72 MMHG | BODY MASS INDEX: 30.82 KG/M2 | HEIGHT: 60 IN

## 2019-08-13 PROCEDURE — 0501F PRENATAL FLOW SHEET: CPT

## 2019-08-13 NOTE — HISTORY OF PRESENT ILLNESS
[FreeTextEntry1] : 28 yo F with PMH Hashimotos thyroiditis for initial evaluation of thyroid disease, currently 14 weeks, 4 days of pregnancy.\par \par SY 12/2/19 - twin gestation\par \par Prior Endocrinologist: Dr Chio Chow ( last seen 11/2017)\par Diagnosed with hypothyroidism 10 years ago\par Prior to pregnancy she was maintained on LT4 25 mcg M-F and 2 tabs Sa and Sun\par She was increased to LT4 50 mcg qd on May 11th., 2019\par She denies FHx of thyroid disease. \par She denies history of radiation to the head or neck. \par She denies history of amiodarone or lithium use.\par No recent steroids \par Thyroid US several years ago - no nodules reported\par Always had regular menstruation\par \par \par

## 2019-08-13 NOTE — ASSESSMENT
[FreeTextEntry1] : 30 yo F with PMH Hashimoto's thyroiditis for initial evaluation of thyroid disease, currently 14 weeks, 4 days of pregnancy.\par \par 1. Hashimoto's thyroiditis - will check TFTs today. Goal TSH during second trimester is TSH < 3.0. Will continue to monitor TFTs q 4 weeks. \par \par RV 3rd trimester.

## 2019-08-15 ENCOUNTER — ASOB RESULT (OUTPATIENT)
Age: 29
End: 2019-08-15

## 2019-08-15 ENCOUNTER — APPOINTMENT (OUTPATIENT)
Dept: ANTEPARTUM | Facility: CLINIC | Age: 29
End: 2019-08-15
Payer: COMMERCIAL

## 2019-08-15 PROCEDURE — 76816 OB US FOLLOW-UP PER FETUS: CPT

## 2019-08-27 ENCOUNTER — APPOINTMENT (OUTPATIENT)
Dept: OBGYN | Facility: CLINIC | Age: 29
End: 2019-08-27
Payer: COMMERCIAL

## 2019-08-27 ENCOUNTER — NON-APPOINTMENT (OUTPATIENT)
Age: 29
End: 2019-08-27

## 2019-08-27 VITALS
SYSTOLIC BLOOD PRESSURE: 116 MMHG | DIASTOLIC BLOOD PRESSURE: 72 MMHG | BODY MASS INDEX: 31.41 KG/M2 | WEIGHT: 160 LBS | HEIGHT: 60 IN

## 2019-08-27 LAB
BILIRUB UR QL STRIP: NORMAL
GLUCOSE UR-MCNC: NORMAL
HCG UR QL: 0.2 EU/DL
HGB UR QL STRIP.AUTO: NORMAL
KETONES UR-MCNC: NORMAL
LEUKOCYTE ESTERASE UR QL STRIP: NORMAL
NITRITE UR QL STRIP: NORMAL
PH UR STRIP: 5.5
PROT UR STRIP-MCNC: NORMAL
SP GR UR STRIP: 1

## 2019-08-27 PROCEDURE — 0502F SUBSEQUENT PRENATAL CARE: CPT

## 2019-08-27 PROCEDURE — 36415 COLL VENOUS BLD VENIPUNCTURE: CPT

## 2019-08-29 ENCOUNTER — APPOINTMENT (OUTPATIENT)
Dept: ANTEPARTUM | Facility: CLINIC | Age: 29
End: 2019-08-29
Payer: COMMERCIAL

## 2019-08-29 ENCOUNTER — ASOB RESULT (OUTPATIENT)
Age: 29
End: 2019-08-29

## 2019-08-29 PROCEDURE — 76816 OB US FOLLOW-UP PER FETUS: CPT | Mod: 59

## 2019-08-30 LAB
BASOPHILS # BLD AUTO: 0.04 K/UL
BASOPHILS NFR BLD AUTO: 0.4 %
EOSINOPHIL # BLD AUTO: 0.25 K/UL
EOSINOPHIL NFR BLD AUTO: 2.3 %
GLUCOSE 1H P 100 G GLC PO SERPL-MCNC: 172 MG/DL
HCT VFR BLD CALC: 36.4 %
HGB BLD-MCNC: 12.2 G/DL
IMM GRANULOCYTES NFR BLD AUTO: 1.3 %
LYMPHOCYTES # BLD AUTO: 1.55 K/UL
LYMPHOCYTES NFR BLD AUTO: 14 %
MAN DIFF?: NORMAL
MCHC RBC-ENTMCNC: 30.7 PG
MCHC RBC-ENTMCNC: 33.5 GM/DL
MCV RBC AUTO: 91.5 FL
MEV IGG FLD QL IA: >300 AU/ML
MEV IGG+IGM SER-IMP: POSITIVE
MONOCYTES # BLD AUTO: 0.58 K/UL
MONOCYTES NFR BLD AUTO: 5.2 %
NEUTROPHILS # BLD AUTO: 8.49 K/UL
NEUTROPHILS NFR BLD AUTO: 76.8 %
PLATELET # BLD AUTO: 272 K/UL
RBC # BLD: 3.98 M/UL
RBC # FLD: 13.7 %
WBC # FLD AUTO: 11.05 K/UL

## 2019-09-03 LAB
GLUCOSE 1H P 100 G GLC PO SERPL-MCNC: 177 MG/DL
GLUCOSE 2H P CHAL SERPL-MCNC: 162 MG/DL
GLUCOSE 3H P CHAL SERPL-MCNC: 143 MG/DL
GLUCOSE BS SERPL-MCNC: 78 MG/DL

## 2019-09-04 ENCOUNTER — APPOINTMENT (OUTPATIENT)
Dept: MATERNAL FETAL MEDICINE | Facility: CLINIC | Age: 29
End: 2019-09-04
Payer: COMMERCIAL

## 2019-09-04 ENCOUNTER — ASOB RESULT (OUTPATIENT)
Age: 29
End: 2019-09-04

## 2019-09-04 VITALS — HEIGHT: 60 IN | WEIGHT: 162.44 LBS | BODY MASS INDEX: 31.89 KG/M2

## 2019-09-04 PROCEDURE — G0108 DIAB MANAGE TRN  PER INDIV: CPT

## 2019-09-06 ENCOUNTER — RX RENEWAL (OUTPATIENT)
Age: 29
End: 2019-09-06

## 2019-09-12 ENCOUNTER — APPOINTMENT (OUTPATIENT)
Dept: ANTEPARTUM | Facility: CLINIC | Age: 29
End: 2019-09-12

## 2019-09-12 ENCOUNTER — APPOINTMENT (OUTPATIENT)
Dept: MATERNAL FETAL MEDICINE | Facility: CLINIC | Age: 29
End: 2019-09-12
Payer: COMMERCIAL

## 2019-09-12 ENCOUNTER — APPOINTMENT (OUTPATIENT)
Dept: ANTEPARTUM | Facility: CLINIC | Age: 29
End: 2019-09-12
Payer: COMMERCIAL

## 2019-09-12 ENCOUNTER — OTHER (OUTPATIENT)
Age: 29
End: 2019-09-12

## 2019-09-12 ENCOUNTER — ASOB RESULT (OUTPATIENT)
Age: 29
End: 2019-09-12

## 2019-09-12 VITALS
WEIGHT: 163.5 LBS | BODY MASS INDEX: 32.1 KG/M2 | HEIGHT: 60 IN | SYSTOLIC BLOOD PRESSURE: 118 MMHG | DIASTOLIC BLOOD PRESSURE: 66 MMHG

## 2019-09-12 DIAGNOSIS — Z3A.28 28 WEEKS GESTATION OF PREGNANCY: ICD-10-CM

## 2019-09-12 DIAGNOSIS — Q27.0 CONGENITAL ABSENCE AND HYPOPLASIA OF UMBILICAL ARTERY: ICD-10-CM

## 2019-09-12 DIAGNOSIS — Z36.89 ENCOUNTER FOR OTHER SPECIFIED ANTENATAL SCREENING: ICD-10-CM

## 2019-09-12 DIAGNOSIS — Z32.02 ENCOUNTER FOR PREGNANCY TEST, RESULT NEGATIVE: ICD-10-CM

## 2019-09-12 DIAGNOSIS — O30.009 TWIN PREGNANCY, UNSPECIFIED NUMBER OF PLACENTA AND UNSPECIFIED NUMBER OF AMNIOTIC SACS, UNSPECIFIED TRIMESTER: ICD-10-CM

## 2019-09-12 PROCEDURE — 76819 FETAL BIOPHYS PROFIL W/O NST: CPT

## 2019-09-12 PROCEDURE — 99214 OFFICE O/P EST MOD 30 MIN: CPT

## 2019-09-12 PROCEDURE — 76816 OB US FOLLOW-UP PER FETUS: CPT | Mod: 59

## 2019-09-12 NOTE — DISCUSSION/SUMMARY
[FreeTextEntry1] : She is scheduled for followup in 2 weeks for interval growth .\par \par Weekly fetal testing to begin at 30-32 weeks. \par \par Diabetic followup is scheduled in 1-2 weeks.

## 2019-09-12 NOTE — DATA REVIEWED
[FreeTextEntry1] : She had gowth scan and Doppler performed at [facility] today. She was told the results were reassuring. \par \par Review of her glucose control shows overall good control of fasting and post prandial values. She reports no difficulty with the diet and will continue to test 4X daily\par \par No medical therapy is needed at this time.

## 2019-09-18 ENCOUNTER — APPOINTMENT (OUTPATIENT)
Dept: MATERNAL FETAL MEDICINE | Facility: CLINIC | Age: 29
End: 2019-09-18
Payer: COMMERCIAL

## 2019-09-18 ENCOUNTER — APPOINTMENT (OUTPATIENT)
Dept: OBGYN | Facility: CLINIC | Age: 29
End: 2019-09-18
Payer: COMMERCIAL

## 2019-09-18 ENCOUNTER — ASOB RESULT (OUTPATIENT)
Age: 29
End: 2019-09-18

## 2019-09-18 ENCOUNTER — NON-APPOINTMENT (OUTPATIENT)
Age: 29
End: 2019-09-18

## 2019-09-18 VITALS — BODY MASS INDEX: 32.24 KG/M2 | HEIGHT: 60 IN | WEIGHT: 164.19 LBS

## 2019-09-18 PROCEDURE — G0108 DIAB MANAGE TRN  PER INDIV: CPT

## 2019-09-18 PROCEDURE — 0502F SUBSEQUENT PRENATAL CARE: CPT

## 2019-09-19 LAB
BILIRUB UR QL STRIP: NORMAL
GLUCOSE UR-MCNC: NORMAL
HCG UR QL: 0.2 EU/DL
HGB UR QL STRIP.AUTO: NORMAL
KETONES UR-MCNC: NORMAL
LEUKOCYTE ESTERASE UR QL STRIP: NORMAL
NITRITE UR QL STRIP: NORMAL
PH UR STRIP: 6
PROT UR STRIP-MCNC: NORMAL
SP GR UR STRIP: 1.01

## 2019-09-24 ENCOUNTER — APPOINTMENT (OUTPATIENT)
Dept: ANTEPARTUM | Facility: CLINIC | Age: 29
End: 2019-09-24
Payer: COMMERCIAL

## 2019-09-24 ENCOUNTER — ASOB RESULT (OUTPATIENT)
Age: 29
End: 2019-09-24

## 2019-09-24 PROCEDURE — 76819 FETAL BIOPHYS PROFIL W/O NST: CPT | Mod: 59

## 2019-09-24 PROCEDURE — 76816 OB US FOLLOW-UP PER FETUS: CPT

## 2019-09-25 ENCOUNTER — OTHER (OUTPATIENT)
Age: 29
End: 2019-09-25

## 2019-10-08 ENCOUNTER — APPOINTMENT (OUTPATIENT)
Dept: ANTEPARTUM | Facility: CLINIC | Age: 29
End: 2019-10-08
Payer: COMMERCIAL

## 2019-10-08 ENCOUNTER — APPOINTMENT (OUTPATIENT)
Dept: ANTEPARTUM | Facility: CLINIC | Age: 29
End: 2019-10-08

## 2019-10-08 ENCOUNTER — ASOB RESULT (OUTPATIENT)
Age: 29
End: 2019-10-08

## 2019-10-08 PROCEDURE — 76816 OB US FOLLOW-UP PER FETUS: CPT

## 2019-10-08 PROCEDURE — 76816 OB US FOLLOW-UP PER FETUS: CPT | Mod: 59

## 2019-10-08 PROCEDURE — 76818 FETAL BIOPHYS PROFILE W/NST: CPT | Mod: 26

## 2019-10-08 PROCEDURE — 76821 MIDDLE CEREBRAL ARTERY ECHO: CPT | Mod: 59

## 2019-10-08 PROCEDURE — 76818 FETAL BIOPHYS PROFILE W/NST: CPT | Mod: 26,59

## 2019-10-09 ENCOUNTER — LABORATORY RESULT (OUTPATIENT)
Age: 29
End: 2019-10-09

## 2019-10-09 ENCOUNTER — APPOINTMENT (OUTPATIENT)
Dept: MATERNAL FETAL MEDICINE | Facility: CLINIC | Age: 29
End: 2019-10-09
Payer: COMMERCIAL

## 2019-10-09 ENCOUNTER — ASOB RESULT (OUTPATIENT)
Age: 29
End: 2019-10-09

## 2019-10-09 ENCOUNTER — NON-APPOINTMENT (OUTPATIENT)
Age: 29
End: 2019-10-09

## 2019-10-09 ENCOUNTER — APPOINTMENT (OUTPATIENT)
Dept: OBGYN | Facility: CLINIC | Age: 29
End: 2019-10-09
Payer: COMMERCIAL

## 2019-10-09 VITALS
DIASTOLIC BLOOD PRESSURE: 88 MMHG | SYSTOLIC BLOOD PRESSURE: 136 MMHG | HEIGHT: 60 IN | WEIGHT: 166 LBS | BODY MASS INDEX: 32.59 KG/M2

## 2019-10-09 VITALS — HEIGHT: 60 IN | WEIGHT: 169.2 LBS | BODY MASS INDEX: 33.22 KG/M2

## 2019-10-09 PROCEDURE — G0108 DIAB MANAGE TRN  PER INDIV: CPT

## 2019-10-09 PROCEDURE — 36415 COLL VENOUS BLD VENIPUNCTURE: CPT

## 2019-10-09 PROCEDURE — 0502F SUBSEQUENT PRENATAL CARE: CPT

## 2019-10-14 LAB
24R-OH-CALCIDIOL SERPL-MCNC: 156 PG/ML
BASOPHILS # BLD AUTO: 0.05 K/UL
BASOPHILS NFR BLD AUTO: 0.5 %
BILIRUB UR QL STRIP: NORMAL
EOSINOPHIL # BLD AUTO: 0.31 K/UL
EOSINOPHIL NFR BLD AUTO: 2.9 %
GLUCOSE UR-MCNC: NORMAL
HCG UR QL: 0.2 EU/DL
HCT VFR BLD CALC: 37.2 %
HGB BLD-MCNC: 12.1 G/DL
HGB UR QL STRIP.AUTO: NORMAL
IMM GRANULOCYTES NFR BLD AUTO: 0.9 %
IRON SERPL-MCNC: 98 UG/DL
KETONES UR-MCNC: NORMAL
LEUKOCYTE ESTERASE UR QL STRIP: NORMAL
LYMPHOCYTES # BLD AUTO: 1.41 K/UL
LYMPHOCYTES NFR BLD AUTO: 13.1 %
MAN DIFF?: NORMAL
MCHC RBC-ENTMCNC: 27.8 PG
MCHC RBC-ENTMCNC: 32.5 GM/DL
MCV RBC AUTO: 85.5 FL
MONOCYTES # BLD AUTO: 0.8 K/UL
MONOCYTES NFR BLD AUTO: 7.4 %
NEUTROPHILS # BLD AUTO: 8.07 K/UL
NEUTROPHILS NFR BLD AUTO: 75.2 %
NITRITE UR QL STRIP: NORMAL
PH UR STRIP: 6
PLATELET # BLD AUTO: 288 K/UL
PROT UR STRIP-MCNC: NORMAL
RBC # BLD: 4.35 M/UL
RBC # FLD: 13 %
SP GR UR STRIP: 1.01
T3 SERPL-MCNC: 180 NG/DL
T3FREE SERPL-MCNC: 2.96 PG/ML
T3REVERSE SERPL-MCNC: 26.1 NG/DL
T3RU NFR SERPL: 1.3 TBI
T4 FREE SERPL-MCNC: 0.9 NG/DL
T4 SERPL-MCNC: 8.4 UG/DL
TSH SERPL-ACNC: 2.12 UIU/ML
WBC # FLD AUTO: 10.74 K/UL

## 2019-10-15 ENCOUNTER — APPOINTMENT (OUTPATIENT)
Dept: OBGYN | Facility: CLINIC | Age: 29
End: 2019-10-15
Payer: COMMERCIAL

## 2019-10-15 ENCOUNTER — APPOINTMENT (OUTPATIENT)
Dept: ANTEPARTUM | Facility: HOSPITAL | Age: 29
End: 2019-10-15

## 2019-10-15 ENCOUNTER — APPOINTMENT (OUTPATIENT)
Dept: ANTEPARTUM | Facility: CLINIC | Age: 29
End: 2019-10-15
Payer: COMMERCIAL

## 2019-10-15 ENCOUNTER — OUTPATIENT (OUTPATIENT)
Dept: OUTPATIENT SERVICES | Facility: HOSPITAL | Age: 29
LOS: 1 days | End: 2019-10-15

## 2019-10-15 ENCOUNTER — NON-APPOINTMENT (OUTPATIENT)
Age: 29
End: 2019-10-15

## 2019-10-15 ENCOUNTER — ASOB RESULT (OUTPATIENT)
Age: 29
End: 2019-10-15

## 2019-10-15 ENCOUNTER — LABORATORY RESULT (OUTPATIENT)
Age: 29
End: 2019-10-15

## 2019-10-15 VITALS
WEIGHT: 170 LBS | DIASTOLIC BLOOD PRESSURE: 76 MMHG | SYSTOLIC BLOOD PRESSURE: 122 MMHG | HEIGHT: 60 IN | BODY MASS INDEX: 33.38 KG/M2

## 2019-10-15 DIAGNOSIS — Z11.3 ENCOUNTER FOR SCREENING FOR INFECTIONS WITH A PREDOMINANTLY SEXUAL MODE OF TRANSMISSION: ICD-10-CM

## 2019-10-15 DIAGNOSIS — O30.033 TWIN PREGNANCY, MONOCHORIONIC/DIAMNIOTIC, THIRD TRIMESTER: ICD-10-CM

## 2019-10-15 LAB
BILIRUB UR QL STRIP: NORMAL
GLUCOSE UR-MCNC: NORMAL
HCG UR QL: 0.2 EU/DL
HGB UR QL STRIP.AUTO: NORMAL
KETONES UR-MCNC: NORMAL
LEUKOCYTE ESTERASE UR QL STRIP: NORMAL
NITRITE UR QL STRIP: NORMAL
PH UR STRIP: 5
PROT UR STRIP-MCNC: NORMAL
SP GR UR STRIP: 1.01

## 2019-10-15 PROCEDURE — 76821 MIDDLE CEREBRAL ARTERY ECHO: CPT | Mod: 59

## 2019-10-15 PROCEDURE — 0502F SUBSEQUENT PRENATAL CARE: CPT

## 2019-10-15 PROCEDURE — 76818 FETAL BIOPHYS PROFILE W/NST: CPT | Mod: 26,59

## 2019-10-16 DIAGNOSIS — O43.193 OTHER MALFORMATION OF PLACENTA, THIRD TRIMESTER: ICD-10-CM

## 2019-10-16 DIAGNOSIS — O30.033 TWIN PREGNANCY, MONOCHORIONIC/DIAMNIOTIC, THIRD TRIMESTER: ICD-10-CM

## 2019-10-16 DIAGNOSIS — Z3A.33 33 WEEKS GESTATION OF PREGNANCY: ICD-10-CM

## 2019-10-16 DIAGNOSIS — O99.283 ENDOCRINE, NUTRITIONAL AND METABOLIC DISEASES COMPLICATING PREGNANCY, THIRD TRIMESTER: ICD-10-CM

## 2019-10-16 LAB
CREAT 24H UR-MCNC: 1.4 G/24 H
CREAT ?TM UR-MCNC: 53 MG/DL
PROT 24H UR-MRATE: 7 MG/DL
PROT ?TM UR-MCNC: 24 HR
PROT UR-MCNC: 187 MG/24 H
SPECIMEN VOL 24H UR: 2675 ML

## 2019-10-19 ENCOUNTER — OTHER (OUTPATIENT)
Age: 29
End: 2019-10-19

## 2019-10-22 ENCOUNTER — APPOINTMENT (OUTPATIENT)
Dept: OBGYN | Facility: CLINIC | Age: 29
End: 2019-10-22
Payer: COMMERCIAL

## 2019-10-22 ENCOUNTER — APPOINTMENT (OUTPATIENT)
Dept: ANTEPARTUM | Facility: HOSPITAL | Age: 29
End: 2019-10-22

## 2019-10-22 ENCOUNTER — APPOINTMENT (OUTPATIENT)
Dept: ANTEPARTUM | Facility: CLINIC | Age: 29
End: 2019-10-22
Payer: COMMERCIAL

## 2019-10-22 ENCOUNTER — ASOB RESULT (OUTPATIENT)
Age: 29
End: 2019-10-22

## 2019-10-22 ENCOUNTER — NON-APPOINTMENT (OUTPATIENT)
Age: 29
End: 2019-10-22

## 2019-10-22 ENCOUNTER — OUTPATIENT (OUTPATIENT)
Dept: OUTPATIENT SERVICES | Facility: HOSPITAL | Age: 29
LOS: 1 days | End: 2019-10-22

## 2019-10-22 VITALS
BODY MASS INDEX: 33.77 KG/M2 | WEIGHT: 172 LBS | HEIGHT: 60 IN | SYSTOLIC BLOOD PRESSURE: 120 MMHG | DIASTOLIC BLOOD PRESSURE: 80 MMHG

## 2019-10-22 DIAGNOSIS — O24.419 GESTATIONAL DIABETES MELLITUS IN PREGNANCY, UNSPECIFIED CONTROL: ICD-10-CM

## 2019-10-22 DIAGNOSIS — O30.032 TWIN PREGNANCY, MONOCHORIONIC/DIAMNIOTIC, SECOND TRIMESTER: ICD-10-CM

## 2019-10-22 PROCEDURE — 76821 MIDDLE CEREBRAL ARTERY ECHO: CPT | Mod: 59

## 2019-10-22 PROCEDURE — 76818 FETAL BIOPHYS PROFILE W/NST: CPT | Mod: 26,59

## 2019-10-22 PROCEDURE — 76816 OB US FOLLOW-UP PER FETUS: CPT

## 2019-10-22 PROCEDURE — 0502F SUBSEQUENT PRENATAL CARE: CPT

## 2019-10-23 DIAGNOSIS — O43.193 OTHER MALFORMATION OF PLACENTA, THIRD TRIMESTER: ICD-10-CM

## 2019-10-23 DIAGNOSIS — O99.283 ENDOCRINE, NUTRITIONAL AND METABOLIC DISEASES COMPLICATING PREGNANCY, THIRD TRIMESTER: ICD-10-CM

## 2019-10-23 DIAGNOSIS — Z3A.34 34 WEEKS GESTATION OF PREGNANCY: ICD-10-CM

## 2019-10-23 DIAGNOSIS — O30.033 TWIN PREGNANCY, MONOCHORIONIC/DIAMNIOTIC, THIRD TRIMESTER: ICD-10-CM

## 2019-10-28 ENCOUNTER — TRANSCRIPTION ENCOUNTER (OUTPATIENT)
Age: 29
End: 2019-10-28

## 2019-10-29 ENCOUNTER — APPOINTMENT (OUTPATIENT)
Dept: ANTEPARTUM | Facility: CLINIC | Age: 29
End: 2019-10-29

## 2019-10-29 ENCOUNTER — TRANSCRIPTION ENCOUNTER (OUTPATIENT)
Age: 29
End: 2019-10-29

## 2019-10-29 ENCOUNTER — INPATIENT (INPATIENT)
Facility: HOSPITAL | Age: 29
LOS: 3 days | Discharge: HOME | End: 2019-11-02
Attending: OBSTETRICS & GYNECOLOGY | Admitting: OBSTETRICS & GYNECOLOGY
Payer: MEDICAID

## 2019-10-29 ENCOUNTER — APPOINTMENT (OUTPATIENT)
Dept: OBGYN | Facility: CLINIC | Age: 29
End: 2019-10-29

## 2019-10-29 ENCOUNTER — APPOINTMENT (OUTPATIENT)
Dept: ANTEPARTUM | Facility: HOSPITAL | Age: 29
End: 2019-10-29

## 2019-10-29 ENCOUNTER — RESULT REVIEW (OUTPATIENT)
Age: 29
End: 2019-10-29

## 2019-10-29 VITALS
DIASTOLIC BLOOD PRESSURE: 89 MMHG | RESPIRATION RATE: 18 BRPM | HEART RATE: 104 BPM | HEIGHT: 60 IN | SYSTOLIC BLOOD PRESSURE: 143 MMHG | WEIGHT: 173.94 LBS | TEMPERATURE: 99 F

## 2019-10-29 DIAGNOSIS — Z3A.00 WEEKS OF GESTATION OF PREGNANCY NOT SPECIFIED: ICD-10-CM

## 2019-10-29 DIAGNOSIS — O26.899 OTHER SPECIFIED PREGNANCY RELATED CONDITIONS, UNSPECIFIED TRIMESTER: ICD-10-CM

## 2019-10-29 LAB
ALBUMIN SERPL ELPH-MCNC: 3.4 G/DL — SIGNIFICANT CHANGE UP (ref 3.3–5)
ALP SERPL-CCNC: 148 U/L — HIGH (ref 40–120)
ALT FLD-CCNC: 54 U/L — HIGH (ref 4–33)
ANION GAP SERPL CALC-SCNC: 14 MMO/L — SIGNIFICANT CHANGE UP (ref 7–14)
APTT BLD: 28.6 SEC — SIGNIFICANT CHANGE UP (ref 27.5–36.3)
AST SERPL-CCNC: 29 U/L — SIGNIFICANT CHANGE UP (ref 4–32)
BASOPHILS # BLD AUTO: 0.06 K/UL — SIGNIFICANT CHANGE UP (ref 0–0.2)
BASOPHILS NFR BLD AUTO: 0.5 % — SIGNIFICANT CHANGE UP (ref 0–2)
BILIRUB SERPL-MCNC: 0.6 MG/DL — SIGNIFICANT CHANGE UP (ref 0.2–1.2)
BLD GP AB SCN SERPL QL: NEGATIVE — SIGNIFICANT CHANGE UP
BUN SERPL-MCNC: 13 MG/DL — SIGNIFICANT CHANGE UP (ref 7–23)
CALCIUM SERPL-MCNC: 9.2 MG/DL — SIGNIFICANT CHANGE UP (ref 8.4–10.5)
CHLORIDE SERPL-SCNC: 99 MMOL/L — SIGNIFICANT CHANGE UP (ref 98–107)
CO2 SERPL-SCNC: 17 MMOL/L — LOW (ref 22–31)
CREAT SERPL-MCNC: 0.59 MG/DL — SIGNIFICANT CHANGE UP (ref 0.5–1.3)
EOSINOPHIL # BLD AUTO: 0.4 K/UL — SIGNIFICANT CHANGE UP (ref 0–0.5)
EOSINOPHIL NFR BLD AUTO: 3.5 % — SIGNIFICANT CHANGE UP (ref 0–6)
FIBRINOGEN PPP-MCNC: 679.5 MG/DL — HIGH (ref 350–510)
GLUCOSE SERPL-MCNC: 78 MG/DL — SIGNIFICANT CHANGE UP (ref 70–99)
HCT VFR BLD CALC: 36.8 % — SIGNIFICANT CHANGE UP (ref 34.5–45)
HGB BLD-MCNC: 12.1 G/DL — SIGNIFICANT CHANGE UP (ref 11.5–15.5)
IMM GRANULOCYTES NFR BLD AUTO: 1.2 % — SIGNIFICANT CHANGE UP (ref 0–1.5)
INR BLD: 0.93 — SIGNIFICANT CHANGE UP (ref 0.88–1.17)
LDH SERPL L TO P-CCNC: 156 U/L — SIGNIFICANT CHANGE UP (ref 135–225)
LYMPHOCYTES # BLD AUTO: 1.97 K/UL — SIGNIFICANT CHANGE UP (ref 1–3.3)
LYMPHOCYTES # BLD AUTO: 17.4 % — SIGNIFICANT CHANGE UP (ref 13–44)
MCHC RBC-ENTMCNC: 27.3 PG — SIGNIFICANT CHANGE UP (ref 27–34)
MCHC RBC-ENTMCNC: 32.9 % — SIGNIFICANT CHANGE UP (ref 32–36)
MCV RBC AUTO: 83.1 FL — SIGNIFICANT CHANGE UP (ref 80–100)
MONOCYTES # BLD AUTO: 0.91 K/UL — HIGH (ref 0–0.9)
MONOCYTES NFR BLD AUTO: 8 % — SIGNIFICANT CHANGE UP (ref 2–14)
NEUTROPHILS # BLD AUTO: 7.83 K/UL — HIGH (ref 1.8–7.4)
NEUTROPHILS NFR BLD AUTO: 69.4 % — SIGNIFICANT CHANGE UP (ref 43–77)
NRBC # FLD: 0 K/UL — SIGNIFICANT CHANGE UP (ref 0–0)
PLATELET # BLD AUTO: 278 K/UL — SIGNIFICANT CHANGE UP (ref 150–400)
PMV BLD: 10.3 FL — SIGNIFICANT CHANGE UP (ref 7–13)
POTASSIUM SERPL-MCNC: 4.1 MMOL/L — SIGNIFICANT CHANGE UP (ref 3.5–5.3)
POTASSIUM SERPL-SCNC: 4.1 MMOL/L — SIGNIFICANT CHANGE UP (ref 3.5–5.3)
PROT SERPL-MCNC: 6.6 G/DL — SIGNIFICANT CHANGE UP (ref 6–8.3)
PROTHROM AB SERPL-ACNC: 10.3 SEC — SIGNIFICANT CHANGE UP (ref 9.8–13.1)
RBC # BLD: 4.43 M/UL — SIGNIFICANT CHANGE UP (ref 3.8–5.2)
RBC # FLD: 13.3 % — SIGNIFICANT CHANGE UP (ref 10.3–14.5)
RH IG SCN BLD-IMP: POSITIVE — SIGNIFICANT CHANGE UP
RH IG SCN BLD-IMP: POSITIVE — SIGNIFICANT CHANGE UP
SODIUM SERPL-SCNC: 130 MMOL/L — LOW (ref 135–145)
T PALLIDUM AB TITR SER: NEGATIVE — SIGNIFICANT CHANGE UP
URATE SERPL-MCNC: 6.1 MG/DL — SIGNIFICANT CHANGE UP (ref 2.5–7)
WBC # BLD: 11.31 K/UL — HIGH (ref 3.8–10.5)
WBC # FLD AUTO: 11.31 K/UL — HIGH (ref 3.8–10.5)

## 2019-10-29 PROCEDURE — 88307 TISSUE EXAM BY PATHOLOGIST: CPT | Mod: 26

## 2019-10-29 PROCEDURE — 59515 CESAREAN DELIVERY: CPT | Mod: U7

## 2019-10-29 PROCEDURE — 88302 TISSUE EXAM BY PATHOLOGIST: CPT | Mod: 26

## 2019-10-29 RX ORDER — GLYCERIN ADULT
1 SUPPOSITORY, RECTAL RECTAL AT BEDTIME
Refills: 0 | Status: DISCONTINUED | OUTPATIENT
Start: 2019-10-29 | End: 2019-11-02

## 2019-10-29 RX ORDER — OXYCODONE HYDROCHLORIDE 5 MG/1
5 TABLET ORAL
Refills: 0 | Status: DISCONTINUED | OUTPATIENT
Start: 2019-10-29 | End: 2019-10-30

## 2019-10-29 RX ORDER — SIMETHICONE 80 MG/1
80 TABLET, CHEWABLE ORAL EVERY 4 HOURS
Refills: 0 | Status: DISCONTINUED | OUTPATIENT
Start: 2019-10-29 | End: 2019-11-02

## 2019-10-29 RX ORDER — NALOXONE HYDROCHLORIDE 4 MG/.1ML
0.1 SPRAY NASAL
Refills: 0 | Status: DISCONTINUED | OUTPATIENT
Start: 2019-10-29 | End: 2019-10-30

## 2019-10-29 RX ORDER — SODIUM CHLORIDE 9 MG/ML
1000 INJECTION, SOLUTION INTRAVENOUS
Refills: 0 | Status: DISCONTINUED | OUTPATIENT
Start: 2019-10-29 | End: 2019-10-30

## 2019-10-29 RX ORDER — OXYCODONE HYDROCHLORIDE 5 MG/1
10 TABLET ORAL
Refills: 0 | Status: DISCONTINUED | OUTPATIENT
Start: 2019-10-29 | End: 2019-10-30

## 2019-10-29 RX ORDER — OXYTOCIN 10 UNIT/ML
VIAL (ML) INJECTION
Qty: 20 | Refills: 0 | Status: DISCONTINUED | OUTPATIENT
Start: 2019-10-29 | End: 2019-10-29

## 2019-10-29 RX ORDER — ONDANSETRON 8 MG/1
4 TABLET, FILM COATED ORAL EVERY 6 HOURS
Refills: 0 | Status: DISCONTINUED | OUTPATIENT
Start: 2019-10-29 | End: 2019-11-02

## 2019-10-29 RX ORDER — LEVOTHYROXINE SODIUM 125 MCG
25 TABLET ORAL DAILY
Refills: 0 | Status: DISCONTINUED | OUTPATIENT
Start: 2019-10-29 | End: 2019-11-02

## 2019-10-29 RX ORDER — DIPHENHYDRAMINE HCL 50 MG
25 CAPSULE ORAL EVERY 6 HOURS
Refills: 0 | Status: DISCONTINUED | OUTPATIENT
Start: 2019-10-29 | End: 2019-11-02

## 2019-10-29 RX ORDER — AZITHROMYCIN 500 MG/1
500 TABLET, FILM COATED ORAL ONCE
Refills: 0 | Status: DISCONTINUED | OUTPATIENT
Start: 2019-10-29 | End: 2019-10-29

## 2019-10-29 RX ORDER — HEPARIN SODIUM 5000 [USP'U]/ML
5000 INJECTION INTRAVENOUS; SUBCUTANEOUS EVERY 12 HOURS
Refills: 0 | Status: DISCONTINUED | OUTPATIENT
Start: 2019-10-29 | End: 2019-11-02

## 2019-10-29 RX ORDER — DIPHENHYDRAMINE HCL 50 MG
25 CAPSULE ORAL EVERY 4 HOURS
Refills: 0 | Status: DISCONTINUED | OUTPATIENT
Start: 2019-10-29 | End: 2019-11-02

## 2019-10-29 RX ORDER — LANOLIN
1 OINTMENT (GRAM) TOPICAL EVERY 6 HOURS
Refills: 0 | Status: DISCONTINUED | OUTPATIENT
Start: 2019-10-29 | End: 2019-11-02

## 2019-10-29 RX ORDER — TETANUS TOXOID, REDUCED DIPHTHERIA TOXOID AND ACELLULAR PERTUSSIS VACCINE, ADSORBED 5; 2.5; 8; 8; 2.5 [IU]/.5ML; [IU]/.5ML; UG/.5ML; UG/.5ML; UG/.5ML
0.5 SUSPENSION INTRAMUSCULAR ONCE
Refills: 0 | Status: DISCONTINUED | OUTPATIENT
Start: 2019-10-29 | End: 2019-11-02

## 2019-10-29 RX ORDER — FAMOTIDINE 10 MG/ML
20 INJECTION INTRAVENOUS ONCE
Refills: 0 | Status: DISCONTINUED | OUTPATIENT
Start: 2019-10-29 | End: 2019-10-29

## 2019-10-29 RX ORDER — INFLUENZA VIRUS VACCINE 15; 15; 15; 15 UG/.5ML; UG/.5ML; UG/.5ML; UG/.5ML
0.5 SUSPENSION INTRAMUSCULAR ONCE
Refills: 0 | Status: COMPLETED | OUTPATIENT
Start: 2019-10-29 | End: 2019-10-29

## 2019-10-29 RX ORDER — SODIUM CHLORIDE 9 MG/ML
1000 INJECTION, SOLUTION INTRAVENOUS ONCE
Refills: 0 | Status: DISCONTINUED | OUTPATIENT
Start: 2019-10-29 | End: 2019-10-29

## 2019-10-29 RX ORDER — KETOROLAC TROMETHAMINE 30 MG/ML
30 SYRINGE (ML) INJECTION EVERY 6 HOURS
Refills: 0 | Status: DISCONTINUED | OUTPATIENT
Start: 2019-10-29 | End: 2019-10-30

## 2019-10-29 RX ORDER — HYDROMORPHONE HYDROCHLORIDE 2 MG/ML
1 INJECTION INTRAMUSCULAR; INTRAVENOUS; SUBCUTANEOUS
Refills: 0 | Status: DISCONTINUED | OUTPATIENT
Start: 2019-10-29 | End: 2019-10-30

## 2019-10-29 RX ORDER — MAGNESIUM HYDROXIDE 400 MG/1
30 TABLET, CHEWABLE ORAL
Refills: 0 | Status: DISCONTINUED | OUTPATIENT
Start: 2019-10-29 | End: 2019-11-02

## 2019-10-29 RX ORDER — METOCLOPRAMIDE HCL 10 MG
10 TABLET ORAL ONCE
Refills: 0 | Status: DISCONTINUED | OUTPATIENT
Start: 2019-10-29 | End: 2019-10-29

## 2019-10-29 RX ORDER — ACETAMINOPHEN 500 MG
975 TABLET ORAL
Refills: 0 | Status: DISCONTINUED | OUTPATIENT
Start: 2019-10-29 | End: 2019-11-02

## 2019-10-29 RX ORDER — SODIUM CHLORIDE 9 MG/ML
1000 INJECTION, SOLUTION INTRAVENOUS
Refills: 0 | Status: DISCONTINUED | OUTPATIENT
Start: 2019-10-29 | End: 2019-10-29

## 2019-10-29 RX ORDER — LEVOTHYROXINE SODIUM 125 MCG
50 TABLET ORAL DAILY
Refills: 0 | Status: DISCONTINUED | OUTPATIENT
Start: 2019-10-29 | End: 2019-10-29

## 2019-10-29 RX ORDER — OXYTOCIN 10 UNIT/ML
41.67 VIAL (ML) INJECTION
Qty: 20 | Refills: 0 | Status: DISCONTINUED | OUTPATIENT
Start: 2019-10-29 | End: 2019-10-30

## 2019-10-29 RX ORDER — CITRIC ACID/SODIUM CITRATE 300-500 MG
30 SOLUTION, ORAL ORAL ONCE
Refills: 0 | Status: DISCONTINUED | OUTPATIENT
Start: 2019-10-29 | End: 2019-10-29

## 2019-10-29 RX ADMIN — SODIUM CHLORIDE 75 MILLILITER(S): 9 INJECTION, SOLUTION INTRAVENOUS at 12:43

## 2019-10-29 RX ADMIN — Medication 30 MILLIGRAM(S): at 23:56

## 2019-10-29 RX ADMIN — Medication 30 MILLIGRAM(S): at 18:50

## 2019-10-29 RX ADMIN — Medication 125 MILLIUNIT(S)/MIN: at 12:43

## 2019-10-29 RX ADMIN — HEPARIN SODIUM 5000 UNIT(S): 5000 INJECTION INTRAVENOUS; SUBCUTANEOUS at 18:35

## 2019-10-29 RX ADMIN — Medication 30 MILLIGRAM(S): at 18:35

## 2019-10-29 NOTE — DISCHARGE NOTE OB - HOSPITAL COURSE
Monochorionic diamniotic twins srom in labor. C/s for cephalic/transverse presentation. VIable male/male

## 2019-10-29 NOTE — DISCHARGE NOTE OB - CARE PROVIDER_API CALL
Robby Kearns)  [facility] Other  [address] [place] [address]  Phone: [phone]  Fax: [phone]  Follow Up Time:

## 2019-10-29 NOTE — OB PROVIDER H&P - ASSESSMENT
monochorionic/ diamniotic TIUP  GDMA1  hypothyroid- Synthroid 50 mcg po qd  fetus "B" 2 vessel cord - fetal echo -WNL  sched for a primary  on 2019 2/2 malpresentation   prior hx PEC on baby ASA qd last dose 10/28/2019 @ 8397 30 y/o  @ 35.1 wks gest , TIUP, monochorionic/ diamniotic presents with c/o PPROM @ 0700 and mild irreg uterine ctxns denies any VB reports +FM x's 2 denies any n/v/d denies any fever or chills denies any headache visual disturbances or right upper epigastric pain ap care comp by:   monochorionic/ diamniotic TIUP  GDMA1  hypothyroid- Synthroid 50 mcg po qd  fetus "B" 2 vessel cord - fetal echo -WNL  sched for a primary  on 2019 2/2 malpresentation   prior hx PEC on baby ASA qd last dose 10/28/2019 @ 2330         abdomen: soft nt on palp  + pooling  SVE: 4.5/80/-3  T(C): 36.9 (10-29-19 @ 08:12), Max: 37 (10-29-19 @ 07:56)  HR: 103 (10-29-19 @ 08:45) (94 - 114)  BP: 137/90 (10-29-19 @ 08:45) (136/90 - 143/89) semi fowlers position   RR: 18 (10-29-19 @ 07:56) (18 - 18)  SpO2: --  TAS:  "A" vtx  "B" transverse     huddle:  Dr Talamantes/ Dr Quintana/ Dr Keith / Luisa Naranjo   admit to l&d  PPROM @ 35.1 wks TIUP for primary / BTL  / PEC labs   see admission orders      NKA  med hx:   hypothyroid- Synthroid 50 mcg po qd  surg hx: denies  gyn hx: denies  ob hx:  2017  FT 8#2 , PEC, Mag/ Labetalol  current pregnancy complication:  monochorionic/ diamniotic TIUP  GDMA1  hypothyroid- Synthroid 50 mcg po qd  fetus "B" 2 vessel cord - fetal echo -WNL  sched for a primary  on 2019 2/2 malpresentation   prior hx PEC on baby ASA qd last dose 10/28/2019 @ 2330

## 2019-10-29 NOTE — DISCHARGE NOTE OB - MATERIALS PROVIDED
Vaccinations/Guide to Postpartum Care/Shaken Baby Prevention Handout/  Immunization Record/Breastfeeding Mother’s Support Group Information/[facility] Hearing Screen Program/[facility] [place] Screening Program/Breastfeeding Log/Back To Sleep Handout/Discharge Medication Information for Patients and Families Pocket Guide/Breastfeeding Guide and Packet/Birth Certificate Instructions

## 2019-10-29 NOTE — OB RN PATIENT PROFILE - PRO INTERPRETER NEED 2
Occupational Discharge Summary Addendum:  Date: 3/19/2019  Total Number of Visits: 1  Referred by:  Sergio Medrano MD      Medical Diagnosis (from order):  Lymphedema of right arm [I89.0] Â - Primary   Body part affected right arm Â    Impairment Diagnosis Edema/lymphedema/effusion Â    Â     This is a note for discharge for pt's prior treatment on 11/14/18 for one session only. Pt was seen for one visit for eval on 11/14/18. Pt did not call to reschedule at that time due to other medical issues. Pt will be discharged, new orders needed to evaluate pt in future.   Status of goals: unable to reassess due to see above English

## 2019-10-29 NOTE — DISCHARGE NOTE OB - CARE PLAN
Principal Discharge DX:	 delivery delivered  Goal:	recovery  Assessment and plan of treatment:	with Dr. Alex in 2 weeks  Secondary Diagnosis:	Monochorionic diamniotic twin gestation in third trimester

## 2019-10-29 NOTE — DISCHARGE NOTE OB - MEDICATION SUMMARY - MEDICATIONS TO STOP TAKING
I will STOP taking the medications listed below when I get home from the hospital:  None I will STOP taking the medications listed below when I get home from the hospital:    labetalol 300 mg oral tablet  -- 1 tab(s) by mouth every 8 hours MDD:3 tabs    NIFEdipine 30 mg oral tablet, extended release  -- 1 tab(s) by mouth once a day

## 2019-10-29 NOTE — OB RN DELIVERY SUMMARY - NS_SEPSISRSKCALC_OBGYN_ALL_OB_FT
EOS calculated successfully. EOS Risk Factor: 0.5/1000 live births ([facility] national incidence); GA=35w1d; Temp=98.6; ROM=3.55; GBS='Unknown'; Antibiotics='No antibiotics or any antibiotics < 2 hrs prior to birth'

## 2019-10-29 NOTE — DISCHARGE NOTE OB - PATIENT PORTAL LINK FT
You can access the FollowMyHealth Patient Portal offered by [facility] by registering at the following website: [email or web address]. By joining [facility]’s FollowMyHealth portal, you will also be able to view your health information using other applications (apps) compatible with our system.

## 2019-10-29 NOTE — OB PROVIDER TRIAGE NOTE - NSOBPROVIDERNOTE_OBGYN_ALL_OB_FT
28 y/o  @ 35.1 wks gest , TIUP, monochorionic/ diamniotic presents with c/o PPROM @ 0700 and mild irreg uterine ctxns denies any VB reports +FM x's 2 denies any n/v/d denies any fever or chills denies any headache visual disturbances or right upper epigastric pain ap care comp by:   monochorionic/ diamniotic TIUP  GDMA1  hypothyroid- Synthroid 50 mcg po qd  fetus "B" 2 vessel cord - fetal echo -WNL  sched for a primary  on 2019 2/2 malpresentation   prior hx PEC on baby ASA qd last dose 10/28/2019 @ 2330         abdomen: soft nt on palp  + pooling  SVE: 4.5/80/-3  T(C): 36.9 (10-29-19 @ 08:12), Max: 37 (10-29-19 @ 07:56)  HR: 103 (10-29-19 @ 08:45) (94 - 114)  BP: 137/90 (10-29-19 @ 08:45) (136/90 - 143/89) semi fowlers position   RR: 18 (10-29-19 @ 07:56) (18 - 18)  SpO2: --  TAS:  "A" vtx  "B" transverse     huddle:  Dr Talamantes/ Dr Quintana/ Dr Keith / Luisa Naranjo   admit to l&d  PPROM @ 35.1 wks TIUP for primary / BTL  / PEC labs   see admission orders      NKA  med hx:   hypothyroid- Synthroid 50 mcg po qd  surg hx: denies  gyn hx: denies  ob hx:  2017  FT 8#2 , PEC, Mag/ Labetalol  current pregnancy complication:  monochorionic/ diamniotic TIUP  GDMA1  hypothyroid- Synthroid 50 mcg po qd  fetus "B" 2 vessel cord - fetal echo -WNL  sched for a primary  on 2019 2/2 malpresentation   prior hx PEC on baby ASA qd last dose 10/28/2019 @ 2330

## 2019-10-29 NOTE — OB NEONATOLOGY/PEDIATRICIAN DELIVERY SUMMARY - NSPEDSNEONOTESB_OBGYN_ALL_OB_FT
Called by Dr. Quintana to attend this primary C/S of 35.1 week twins with premature rupture of membranes and transverse lie born to a 29 year old , A+, GBS unknown, all other pnl negative and immune. Pregnancy complicated by GDMA1, and hypothyroid on synthroid. Male B born with spontaneous cry and good tone. W/D/S/S. Routine resuscitation. Apgars 9,9 for color. PE remarkable for 2 vessel cord.

## 2019-10-30 LAB
BASOPHILS # BLD AUTO: 0.04 K/UL — SIGNIFICANT CHANGE UP (ref 0–0.2)
BASOPHILS NFR BLD AUTO: 0.3 % — SIGNIFICANT CHANGE UP (ref 0–2)
EOSINOPHIL # BLD AUTO: 0.23 K/UL — SIGNIFICANT CHANGE UP (ref 0–0.5)
EOSINOPHIL NFR BLD AUTO: 1.9 % — SIGNIFICANT CHANGE UP (ref 0–6)
HCT VFR BLD CALC: 29.8 % — LOW (ref 34.5–45)
HGB BLD-MCNC: 9.9 G/DL — LOW (ref 11.5–15.5)
IMM GRANULOCYTES NFR BLD AUTO: 0.5 % — SIGNIFICANT CHANGE UP (ref 0–1.5)
LYMPHOCYTES # BLD AUTO: 1.69 K/UL — SIGNIFICANT CHANGE UP (ref 1–3.3)
LYMPHOCYTES # BLD AUTO: 14.2 % — SIGNIFICANT CHANGE UP (ref 13–44)
MCHC RBC-ENTMCNC: 27.7 PG — SIGNIFICANT CHANGE UP (ref 27–34)
MCHC RBC-ENTMCNC: 33.2 % — SIGNIFICANT CHANGE UP (ref 32–36)
MCV RBC AUTO: 83.2 FL — SIGNIFICANT CHANGE UP (ref 80–100)
MONOCYTES # BLD AUTO: 0.83 K/UL — SIGNIFICANT CHANGE UP (ref 0–0.9)
MONOCYTES NFR BLD AUTO: 7 % — SIGNIFICANT CHANGE UP (ref 2–14)
NEUTROPHILS # BLD AUTO: 9.06 K/UL — HIGH (ref 1.8–7.4)
NEUTROPHILS NFR BLD AUTO: 76.1 % — SIGNIFICANT CHANGE UP (ref 43–77)
NRBC # FLD: 0 K/UL — SIGNIFICANT CHANGE UP (ref 0–0)
PLATELET # BLD AUTO: 246 K/UL — SIGNIFICANT CHANGE UP (ref 150–400)
PMV BLD: 10.1 FL — SIGNIFICANT CHANGE UP (ref 7–13)
RBC # BLD: 3.58 M/UL — LOW (ref 3.8–5.2)
RBC # FLD: 13.3 % — SIGNIFICANT CHANGE UP (ref 10.3–14.5)
WBC # BLD: 11.91 K/UL — HIGH (ref 3.8–10.5)
WBC # FLD AUTO: 11.91 K/UL — HIGH (ref 3.8–10.5)

## 2019-10-30 RX ORDER — IBUPROFEN 200 MG
600 TABLET ORAL EVERY 6 HOURS
Refills: 0 | Status: DISCONTINUED | OUTPATIENT
Start: 2019-10-30 | End: 2019-11-02

## 2019-10-30 RX ADMIN — HEPARIN SODIUM 5000 UNIT(S): 5000 INJECTION INTRAVENOUS; SUBCUTANEOUS at 18:48

## 2019-10-30 RX ADMIN — Medication 975 MILLIGRAM(S): at 12:27

## 2019-10-30 RX ADMIN — Medication 600 MILLIGRAM(S): at 15:22

## 2019-10-30 RX ADMIN — Medication 30 MILLIGRAM(S): at 05:44

## 2019-10-30 RX ADMIN — Medication 600 MILLIGRAM(S): at 23:30

## 2019-10-30 RX ADMIN — HEPARIN SODIUM 5000 UNIT(S): 5000 INJECTION INTRAVENOUS; SUBCUTANEOUS at 05:44

## 2019-10-30 RX ADMIN — Medication 30 MILLIGRAM(S): at 00:26

## 2019-10-30 RX ADMIN — Medication 30 MILLIGRAM(S): at 06:14

## 2019-10-30 RX ADMIN — Medication 600 MILLIGRAM(S): at 16:00

## 2019-10-30 RX ADMIN — SIMETHICONE 80 MILLIGRAM(S): 80 TABLET, CHEWABLE ORAL at 22:38

## 2019-10-30 RX ADMIN — Medication 975 MILLIGRAM(S): at 21:30

## 2019-10-30 RX ADMIN — Medication 25 MICROGRAM(S): at 05:44

## 2019-10-30 RX ADMIN — Medication 600 MILLIGRAM(S): at 22:38

## 2019-10-30 RX ADMIN — Medication 975 MILLIGRAM(S): at 02:20

## 2019-10-30 RX ADMIN — Medication 975 MILLIGRAM(S): at 18:48

## 2019-10-30 RX ADMIN — Medication 975 MILLIGRAM(S): at 13:20

## 2019-10-30 RX ADMIN — Medication 975 MILLIGRAM(S): at 02:50

## 2019-10-30 NOTE — PROGRESS NOTE ADULT - SUBJECTIVE AND OBJECTIVE BOX
Pain Service Follow-up  Postop Day  1    S/P  C- Section    T(C): 36.7 (10-30-19 @ 04:58), Max: 37 (10-29-19 @ 07:56)  HR: 82 (10-30-19 @ 04:58) (65 - 114)  BP: 125/76 (10-30-19 @ 04:58) (107/65 - 143/89)  RR: 18 (10-30-19 @ 04:58) (16 - 20)  SpO2: 99% (10-30-19 @ 04:58) (95% - 100%)  Wt(kg): --      THERAPY:  Spinal Morphine     Sedation Score:	  [X] Alert	      [  ] Drowsy       [  ] Arousable	[  ] Asleep         [  ] Unresponsive    Side Effects:	  [X] None	      [  ] Nausea       [  ] Pruritus        [  ] Weakness   [  ] Numbness        ASSESSMENT/ PLAN   [ X ] Discontinue         [  ] Continue    [ X ] Documentation and Verification of current medications       Satisfactory Post Anesthetic Course

## 2019-10-30 NOTE — PROGRESS NOTE ADULT - SUBJECTIVE AND OBJECTIVE BOX
OB Progress Note:  Delivery, POD#1    S: 28yo POD#1 s/p LTCS . Her pain is well controlled. She is tolerating a regular diet and passing flatus. Denies N/V. Denies CP/SOB/lightheadedness/dizziness. Pt denies sxs of PEC: denies headache, visual changes, RUQ pain, respiratory distress  She is ambulating without difficulty.   Voiding spontaneously.     O:   Vital Signs Last 24 Hrs  T(C): 36.7 (30 Oct 2019 04:58), Max: 37 (29 Oct 2019 07:56)  T(F): 98.1 (30 Oct 2019 04:58), Max: 98.6 (29 Oct 2019 07:56)  HR: 82 (30 Oct 2019 04:58) (65 - 114)  BP: 125/76 (30 Oct 2019 04:58) (107/65 - 143/89)  BP(mean): 84 (29 Oct 2019 14:30) (75 - 89)  RR: 18 (30 Oct 2019 04:58) (16 - 20)  SpO2: 99% (30 Oct 2019 04:58) (95% - 100%)    Labs:  Blood type: A Positive  Rubella IgG: RPR: Negative                          9.9<L>   11.91<H> >-----------< 246    ( 10-30 @ 06:15 )             29.8<L>                        12.1   11.31<H> >-----------< 278    ( 10-29 @ 08:45 )             36.8    10-29-19 @ 08:45      130<L>  |  99  |  13  ----------------------------<  78  4.1   |  17<L>  |  0.59        Ca    9.2      29 Oct 2019 08:45    TPro  6.6  /  Alb  3.4  /  TBili  0.6  /  DBili  x   /  AST  29  /  ALT  54<H>  /  AlkPhos  148<H>  10-29-19 @ 08:45          PE:  General: NAD  Abdomen: Mildly distended, appropriately tender, incision c/d/i.  Extremities: No erythema, no pitting edema

## 2019-10-31 RX ORDER — ASCORBIC ACID 60 MG
500 TABLET,CHEWABLE ORAL DAILY
Refills: 0 | Status: DISCONTINUED | OUTPATIENT
Start: 2019-10-31 | End: 2019-11-02

## 2019-10-31 RX ORDER — OXYCODONE HYDROCHLORIDE 5 MG/1
5 TABLET ORAL
Refills: 0 | Status: DISCONTINUED | OUTPATIENT
Start: 2019-10-31 | End: 2019-11-02

## 2019-10-31 RX ORDER — SENNA PLUS 8.6 MG/1
2 TABLET ORAL DAILY
Refills: 0 | Status: DISCONTINUED | OUTPATIENT
Start: 2019-10-31 | End: 2019-11-02

## 2019-10-31 RX ORDER — FERROUS SULFATE 325(65) MG
325 TABLET ORAL THREE TIMES A DAY
Refills: 0 | Status: DISCONTINUED | OUTPATIENT
Start: 2019-10-31 | End: 2019-11-02

## 2019-10-31 RX ORDER — FERROUS SULFATE 325(65) MG
325 TABLET ORAL DAILY
Refills: 0 | Status: DISCONTINUED | OUTPATIENT
Start: 2019-10-31 | End: 2019-10-31

## 2019-10-31 RX ADMIN — Medication 325 MILLIGRAM(S): at 08:36

## 2019-10-31 RX ADMIN — SIMETHICONE 80 MILLIGRAM(S): 80 TABLET, CHEWABLE ORAL at 18:00

## 2019-10-31 RX ADMIN — Medication 975 MILLIGRAM(S): at 21:42

## 2019-10-31 RX ADMIN — OXYCODONE HYDROCHLORIDE 5 MILLIGRAM(S): 5 TABLET ORAL at 05:00

## 2019-10-31 RX ADMIN — Medication 975 MILLIGRAM(S): at 15:45

## 2019-10-31 RX ADMIN — Medication 975 MILLIGRAM(S): at 15:07

## 2019-10-31 RX ADMIN — Medication 600 MILLIGRAM(S): at 18:35

## 2019-10-31 RX ADMIN — Medication 600 MILLIGRAM(S): at 12:16

## 2019-10-31 RX ADMIN — Medication 975 MILLIGRAM(S): at 22:45

## 2019-10-31 RX ADMIN — HEPARIN SODIUM 5000 UNIT(S): 5000 INJECTION INTRAVENOUS; SUBCUTANEOUS at 17:59

## 2019-10-31 RX ADMIN — Medication 600 MILLIGRAM(S): at 13:12

## 2019-10-31 RX ADMIN — Medication 600 MILLIGRAM(S): at 23:52

## 2019-10-31 RX ADMIN — SIMETHICONE 80 MILLIGRAM(S): 80 TABLET, CHEWABLE ORAL at 12:18

## 2019-10-31 RX ADMIN — Medication 975 MILLIGRAM(S): at 08:36

## 2019-10-31 RX ADMIN — Medication 500 MILLIGRAM(S): at 12:15

## 2019-10-31 RX ADMIN — Medication 600 MILLIGRAM(S): at 05:00

## 2019-10-31 RX ADMIN — HEPARIN SODIUM 5000 UNIT(S): 5000 INJECTION INTRAVENOUS; SUBCUTANEOUS at 05:00

## 2019-10-31 RX ADMIN — SIMETHICONE 80 MILLIGRAM(S): 80 TABLET, CHEWABLE ORAL at 23:53

## 2019-10-31 RX ADMIN — SIMETHICONE 80 MILLIGRAM(S): 80 TABLET, CHEWABLE ORAL at 05:00

## 2019-10-31 RX ADMIN — Medication 1 TABLET(S): at 12:16

## 2019-10-31 RX ADMIN — Medication 975 MILLIGRAM(S): at 09:30

## 2019-10-31 RX ADMIN — Medication 25 MICROGRAM(S): at 05:00

## 2019-10-31 RX ADMIN — Medication 600 MILLIGRAM(S): at 05:35

## 2019-10-31 RX ADMIN — OXYCODONE HYDROCHLORIDE 5 MILLIGRAM(S): 5 TABLET ORAL at 05:30

## 2019-10-31 RX ADMIN — Medication 600 MILLIGRAM(S): at 17:59

## 2019-10-31 NOTE — PROGRESS NOTE ADULT - SUBJECTIVE AND OBJECTIVE BOX
SUBJECTIVE:    Pain: Controlled    Complaints: None    MILESTONES:    Alert and Oriented x 3  [ x ]  Out of bed/ ambulating. [ x ]  Flatus:   Positive [ x ]  Negative [  ]  Bowel movement  [  ] Positive [  ] Negative   Voiding [x  ] Due to void [  ]   Hui/Indwelling catheter in place [  ]  Diet: Regular [ x ]  Clears [  ]  NPO [  ]    Infant feeding:  Breast [ X ]   Bottle [  ]  Both [ X ]  Feeding related issues and/or concerns:      OBJECTIVE:  T(C): 36.4 (10-31-19 @ 05:39), Max: 36.7 (10-30-19 @ 14:10)  HR: 79 (10-31-19 @ 05:39) (79 - 81)  BP: 121/74 (10-31-19 @ 05:39) (114/62 - 121/74)  RR: 18 (10-31-19 @ 05:39) (18 - 18)  SpO2: 100% (10-31-19 @ 05:39) (100% - 100%)  Wt(kg): --                        9.9    11.91 )-----------( 246      ( 30 Oct 2019 06:15 )             29.8           Blood Type: A Positive    RPR: Negative          MEDICATIONS  (STANDING):  acetaminophen   Tablet .. 975 milliGRAM(s) Oral <User Schedule>  ascorbic acid 500 milliGRAM(s) Oral daily  diphtheria/tetanus/pertussis (acellular) Vaccine (ADAcel) 0.5 milliLiter(s) IntraMuscular once  ferrous    sulfate 325 milliGRAM(s) Oral three times a day  heparin  Injectable 5000 Unit(s) SubCutaneous every 12 hours  ibuprofen  Tablet. 600 milliGRAM(s) Oral every 6 hours  levothyroxine 25 MICROGram(s) Oral daily  prenatal multivitamin 1 Tablet(s) Oral daily    MEDICATIONS  (PRN):  diphenhydrAMINE 25 milliGRAM(s) Oral every 6 hours PRN Itching  diphenhydrAMINE   Injectable 25 milliGRAM(s) IV Push every 4 hours PRN Pruritus  glycerin Suppository - Adult 1 Suppository(s) Rectal at bedtime PRN Constipation  lanolin Ointment 1 Application(s) Topical every 6 hours PRN Sore Nipples  magnesium hydroxide Suspension 30 milliLiter(s) Oral two times a day PRN Constipation  ondansetron Injectable 4 milliGRAM(s) IV Push every 6 hours PRN Nausea  oxyCODONE    IR 5 milliGRAM(s) Oral every 3 hours PRN Moderate Pain (4 - 6)  senna 2 Tablet(s) Oral daily PRN Constipation  simethicone 80 milliGRAM(s) Chew every 4 hours PRN Gas        ASSESSMENT:    29y     G   2   P         PO Day#  2        Delivery: Primary [ X ]    Repeat [  ]       EBL - 708, s/p Juanito Salpingectomy                                  Indication of procedure: Labor, Twins, Abnormal Presentation    Condition: Stable    Past Medical History significant for: HPI:      Current Issues:    Breasts:  Soft [x  ]   Engorged [  ]  Nipples:  Abdomen: Soft [ x ]   Distended [  ] Nontender [  ]     Bowel sounds :  Present [  ]  Absent [  ]   Fundus:  Firm [x  ]  Boggy [  ]    Abdominal incision: Clean, Dry and Intact [x  ]  Staples [  ] Steri Strips [  ] Dermabond [ X ] Sutures [  ]    Patient wearing abdominal binder for support.    Vaginal: Lochia:  Heavy [  ]  Moderate [ x ]   Scant [  ]    Extremities: Edema [  ] Negative Negra's Sign [  ] Nontender Juanito  [ x ] Positive pedal pulses [  ]    Other relevant physical exam findings:      PLAN:    Plan: Increase ambulation, analgesia PRN and pain medication protocol standing oxycodone, ibuprofen and acetaminophen.    Diet: Regular diet    Continue routine post-operative and postpartum care.     Diabetes - For Repeat Glucose Testing in 6 Weeks    Discharge Planning [ x ]    For discharge Today  [    ]    Consults:  Social Work [  ]  Lactation [ x ]  Other [         ]

## 2019-10-31 NOTE — PROGRESS NOTE ADULT - SUBJECTIVE AND OBJECTIVE BOX
OB Progress Note: pTLCS, POD#2    S: 28yo POD#2 s/p pLTCS w/BTL for mono/di TIUP. Her pain is well controlled. She is tolerating a regular diet and passing flatus. Voiding spontaneously. Denies N/V/D. Denies CP/SOB/lightheadedness/dizziness. She is breastfeeding.    O:  Vitals:  Vital Signs Last 24 Hrs  T(C): 36.4 (31 Oct 2019 05:39), Max: 36.7 (30 Oct 2019 14:10)  T(F): 97.6 (31 Oct 2019 05:39), Max: 98 (30 Oct 2019 14:10)  HR: 79 (31 Oct 2019 05:39) (79 - 81)  BP: 121/74 (31 Oct 2019 05:39) (114/62 - 121/74)  RR: 18 (31 Oct 2019 05:39) (18 - 18)  SpO2: 100% (31 Oct 2019 05:39) (100% - 100%)    MEDICATIONS  (STANDING):  acetaminophen   Tablet .. 975 milliGRAM(s) Oral <User Schedule>  ascorbic acid 500 milliGRAM(s) Oral daily  diphtheria/tetanus/pertussis (acellular) Vaccine (ADAcel) 0.5 milliLiter(s) IntraMuscular once  ferrous    sulfate 325 milliGRAM(s) Oral three times a day  heparin  Injectable 5000 Unit(s) SubCutaneous every 12 hours  ibuprofen  Tablet. 600 milliGRAM(s) Oral every 6 hours  levothyroxine 25 MICROGram(s) Oral daily  prenatal multivitamin 1 Tablet(s) Oral daily      MEDICATIONS  (PRN):  diphenhydrAMINE 25 milliGRAM(s) Oral every 6 hours PRN Itching  diphenhydrAMINE   Injectable 25 milliGRAM(s) IV Push every 4 hours PRN Pruritus  glycerin Suppository - Adult 1 Suppository(s) Rectal at bedtime PRN Constipation  lanolin Ointment 1 Application(s) Topical every 6 hours PRN Sore Nipples  magnesium hydroxide Suspension 30 milliLiter(s) Oral two times a day PRN Constipation  ondansetron Injectable 4 milliGRAM(s) IV Push every 6 hours PRN Nausea  oxyCODONE    IR 5 milliGRAM(s) Oral every 3 hours PRN Moderate Pain (4 - 6)  senna 2 Tablet(s) Oral daily PRN Constipation  simethicone 80 milliGRAM(s) Chew every 4 hours PRN Gas      Labs:  Blood type: A Positive  Rubella IgG: RPR: Negative                          9.9<L>   11.91<H> >-----------< 246    ( 10-30 @ 06:15 )             29.8<L>                        12.1   11.31<H> >-----------< 278    ( 10-29 @ 08:45 )             36.8    10-29-19 @ 08:45      130<L>  |  99  |  13  ----------------------------<  78  4.1   |  17<L>  |  0.59        Ca    9.2      29 Oct 2019 08:45    TPro  6.6  /  Alb  3.4  /  TBili  0.6  /  DBili  x   /  AST  29  /  ALT  54<H>  /  AlkPhos  148<H>  10-29-19 @ 08:45          PE:  General: NAD  Abdomen: Soft, appropriately tender, incision c/d/i with dermabond  Extremities: No erythema, no pitting edema    A/P: 28yo POD#2 s/p pLTCS w/BTL.  - Continue regular diet.  - Increase ambulation.  - Continue motrin, tylenol, oxycodone PRN for pain control.  - Discharge planning for POD#4 as twins in the NICU    Robby Alex MD

## 2019-11-01 RX ORDER — IBUPROFEN 200 MG
1 TABLET ORAL
Qty: 0 | Refills: 0 | DISCHARGE
Start: 2019-11-01

## 2019-11-01 RX ORDER — ACETAMINOPHEN 500 MG
3 TABLET ORAL
Qty: 0 | Refills: 0 | DISCHARGE
Start: 2019-11-01

## 2019-11-01 RX ADMIN — Medication 600 MILLIGRAM(S): at 18:20

## 2019-11-01 RX ADMIN — HEPARIN SODIUM 5000 UNIT(S): 5000 INJECTION INTRAVENOUS; SUBCUTANEOUS at 18:20

## 2019-11-01 RX ADMIN — Medication 600 MILLIGRAM(S): at 12:07

## 2019-11-01 RX ADMIN — Medication 975 MILLIGRAM(S): at 15:46

## 2019-11-01 RX ADMIN — Medication 975 MILLIGRAM(S): at 03:49

## 2019-11-01 RX ADMIN — Medication 975 MILLIGRAM(S): at 10:15

## 2019-11-01 RX ADMIN — Medication 600 MILLIGRAM(S): at 07:00

## 2019-11-01 RX ADMIN — HEPARIN SODIUM 5000 UNIT(S): 5000 INJECTION INTRAVENOUS; SUBCUTANEOUS at 05:48

## 2019-11-01 RX ADMIN — Medication 600 MILLIGRAM(S): at 19:09

## 2019-11-01 RX ADMIN — Medication 975 MILLIGRAM(S): at 11:00

## 2019-11-01 RX ADMIN — SIMETHICONE 80 MILLIGRAM(S): 80 TABLET, CHEWABLE ORAL at 15:46

## 2019-11-01 RX ADMIN — Medication 600 MILLIGRAM(S): at 05:49

## 2019-11-01 RX ADMIN — Medication 25 MICROGRAM(S): at 05:50

## 2019-11-01 RX ADMIN — Medication 600 MILLIGRAM(S): at 13:00

## 2019-11-01 RX ADMIN — Medication 975 MILLIGRAM(S): at 16:39

## 2019-11-01 RX ADMIN — SENNA PLUS 2 TABLET(S): 8.6 TABLET ORAL at 12:11

## 2019-11-01 RX ADMIN — SIMETHICONE 80 MILLIGRAM(S): 80 TABLET, CHEWABLE ORAL at 05:51

## 2019-11-01 RX ADMIN — Medication 975 MILLIGRAM(S): at 04:50

## 2019-11-01 RX ADMIN — SIMETHICONE 80 MILLIGRAM(S): 80 TABLET, CHEWABLE ORAL at 10:15

## 2019-11-01 RX ADMIN — Medication 1 TABLET(S): at 10:15

## 2019-11-01 RX ADMIN — Medication 600 MILLIGRAM(S): at 00:50

## 2019-11-01 NOTE — PROGRESS NOTE ADULT - ATTENDING COMMENTS
OB Attending    Patient POD#3 s/p C/S mono-di TIUP. Patient seen and examined at bedside. No issues, feeling well. Incision c/d/i with dermabond. Agree with above assessment and plan  -continue routine Post op care  -d/c planning for POD#4    N Sample-MD Vin

## 2019-11-01 NOTE — PROGRESS NOTE ADULT - ASSESSMENT
A/P: 28yo POD#1 s/p LTCS.  Patient is stable and doing well post-operatively.
ASSESSMENT:  29y  y/o G  2 P 2   PO Day#  3      Delivery: Primary [ x ]    Repeat [  ]                                       Indication of procedure: abnormal presentation  Condition: Stable  Past Medical History significant for: HPI:

## 2019-11-01 NOTE — PROGRESS NOTE ADULT - SUBJECTIVE AND OBJECTIVE BOX
SUBJECTIVE:    Pain: well controlled  Complaints: none    MILESTONES:    Alert and oriented x 3  [ x ]  Out of bed/ ambulating. [  x]  Flatus: [ x ]  Postive [  ] Negative   Bowel movement  [ x ] Positive [  ] Negative   Voiding [x  ] Due to void [  ]   Diet: Regular [x  ]  Clears [  ]  NPO [  ]  Infant feeding:  Breast [ x ]   Bottle [  ]  Both [  ]  Feeding related inssues and/or concerns: none      OBJECTIVE:  T(C): 36.9 (19 @ 06:26), Max: 36.9 (10-31-19 @ 18:18)  HR: 72 (19 @ 06:26) (72 - 86)  BP: 132/84 (19 @ 06:26) (127/80 - 132/84)  RR: 19 (19 @ 06:26) (18 - 19)  SpO2: 97% (19 @ 06:26) (97% - 99%)  Wt(kg): --    MEDICATIONS  (STANDING):  acetaminophen   Tablet .. 975 milliGRAM(s) Oral <User Schedule>  ascorbic acid 500 milliGRAM(s) Oral daily  diphtheria/tetanus/pertussis (acellular) Vaccine (ADAcel) 0.5 milliLiter(s) IntraMuscular once  ferrous    sulfate 325 milliGRAM(s) Oral three times a day  heparin  Injectable 5000 Unit(s) SubCutaneous every 12 hours  ibuprofen  Tablet. 600 milliGRAM(s) Oral every 6 hours  levothyroxine 25 MICROGram(s) Oral daily  prenatal multivitamin 1 Tablet(s) Oral daily    MEDICATIONS  (PRN):  diphenhydrAMINE 25 milliGRAM(s) Oral every 6 hours PRN Itching  diphenhydrAMINE   Injectable 25 milliGRAM(s) IV Push every 4 hours PRN Pruritus  glycerin Suppository - Adult 1 Suppository(s) Rectal at bedtime PRN Constipation  lanolin Ointment 1 Application(s) Topical every 6 hours PRN Sore Nipples  magnesium hydroxide Suspension 30 milliLiter(s) Oral two times a day PRN Constipation  ondansetron Injectable 4 milliGRAM(s) IV Push every 6 hours PRN Nausea  oxyCODONE    IR 5 milliGRAM(s) Oral every 3 hours PRN Moderate Pain (4 - 6)  senna 2 Tablet(s) Oral daily PRN Constipation  simethicone 80 milliGRAM(s) Chew every 4 hours PRN Gas    ASSESSMENT:  29y  y/o G  2 P 2   PO Day#  3      Delivery: Primary [ x ]    Repeat [  ]                                       Indication of procedure: abnormal presentation  Condition: Stable  Past Medical History significant for: HPI:    Current Issues:none  Heart:        RRR                      Lungs: clear  Breasts:  Soft [ x ]   Engorged [  ]  Abdomen: Soft [ x ] , distended [  ] nontender [ x ]   Bowel sounds :  Present [x  ]  Absent [  ]   Fundus firm [x  ]  Boggy [  ]  Abdominal incision: Clean, dry and intact [x  ]  Staples [  ] Steri Strips [  ] Dermabond [  ] Sutures [x    Patient wearing abdominal binder for support.  Vaginal: Lochia:  Heavy [  ]  Moderate [  ]   Scant [ x ]  Extremities: Edema [ 1+ ] negative Negra's Sign [ x ] Nontender Juanito  [ x ] Positive pedal pulses [x  ]  Other relevant physical exam findings:none      PLAN:  Plan: Increase ambulation, analgesia PRN and pain medication protocol standing oxycodone, ibuprofen and acetaminophen.  Diet: Regular diet  Continue routine post-operative and postpartum care.   Discharge Planning [ x SUBJECTIVE:    Pain: well controlled  Complaints: none    MILESTONES:    Alert and oriented x 3  [ x ]  Out of bed/ ambulating. [  x]  Flatus: [ x ]  Postive [  ] Negative   Bowel movement  [ x ] Positive [  ] Negative   Voiding [x  ] Due to void [  ]   Diet: Regular [x  ]  Clears [  ]  NPO [  ]  Infant feeding:  Breast [ x ]   Bottle [  ]  Both [  ]  Feeding related inssues and/or concerns: none      OBJECTIVE:  T(C): 36.9 (11-01-19 @ 06:26), Max: 36.9 (10-31-19 @ 18:18)  HR: 72 (11-01-19 @ 06:26) (72 - 86)  BP: 132/84 (11-01-19 @ 06:26) (127/80 - 132/84)  RR: 19 (11-01-19 @ 06:26) (18 - 19)  SpO2: 97% (11-01-19 @ 06:26) (97% - 99%)  Wt(kg): --    MEDICATIONS  (STANDING):  acetaminophen   Tablet .. 975 milliGRAM(s) Oral <User Schedule>  ascorbic acid 500 milliGRAM(s) Oral daily  diphtheria/tetanus/pertussis (acellular) Vaccine (ADAcel) 0.5 milliLiter(s) IntraMuscular once  ferrous    sulfate 325 milliGRAM(s) Oral three times a day  heparin  Injectable 5000 Unit(s) SubCutaneous every 12 hours  ibuprofen  Tablet. 600 milliGRAM(s) Oral every 6 hours  levothyroxine 25 MICROGram(s) Oral daily  prenatal multivitamin 1 Tablet(s) Oral daily    MEDICATIONS  (PRN):  diphenhydrAMINE 25 milliGRAM(s) Oral every 6 hours PRN Itching  diphenhydrAMINE   Injectable 25 milliGRAM(s) IV Push every 4 hours PRN Pruritus  glycerin Suppository - Adult 1 Suppository(s) Rectal at bedtime PRN Constipation  lanolin Ointment 1 Application(s) Topical every 6 hours PRN Sore Nipples  magnesium hydroxide Suspension 30 milliLiter(s) Oral two times a day PRN Constipation  ondansetron Injectable 4 milliGRAM(s) IV Push every 6 hours PRN Nausea  oxyCODONE    IR 5 milliGRAM(s) Oral every 3 hours PRN Moderate Pain (4 - 6)  senna 2 Tablet(s) Oral daily PRN Constipation  simethicone 80 milliGRAM(s) Chew every 4 hours PRN Gas      Current Issues:none  Heart:        RRR                      Lungs: clear  Breasts:  Soft [ x ]   Engorged [  ]  Abdomen: Soft [ x ] , distended [  ] nontender [ x ]   Bowel sounds :  Present [x  ]  Absent [  ]   Fundus firm [x  ]  Boggy [  ]  Abdominal incision: Clean, dry and intact [x  ]  Staples [  ] Steri Strips [  ] Dermabond [  ] Sutures [x    Patient wearing abdominal binder for support.  Vaginal: Lochia:  Heavy [  ]  Moderate [  ]   Scant [ x ]  Extremities: Edema [ 1+ ] negative Negra's Sign [ x ] Nontender Juanito  [ x ] Positive pedal pulses [x  ]  Other relevant physical exam findings:none

## 2019-11-01 NOTE — PROGRESS NOTE ADULT - PROBLEM SELECTOR PLAN 1
- Continue regular diet.  - Increase ambulation.  - Continue motrin, tylenol, oxycodone PRN for pain control.   - F/u AM CBC    Tod Nair PGY1
PLAN:  Plan: Increase ambulation, analgesia PRN and pain medication protocol standing oxycodone, ibuprofen and acetaminophen.  Diet: Regular diet  Continue routine post-operative and postpartum care.   Discharge Planning [ x

## 2019-11-02 VITALS — SYSTOLIC BLOOD PRESSURE: 130 MMHG | DIASTOLIC BLOOD PRESSURE: 80 MMHG

## 2019-11-02 RX ADMIN — Medication 25 MICROGRAM(S): at 05:48

## 2019-11-02 RX ADMIN — Medication 600 MILLIGRAM(S): at 11:39

## 2019-11-02 RX ADMIN — Medication 975 MILLIGRAM(S): at 04:54

## 2019-11-02 RX ADMIN — Medication 600 MILLIGRAM(S): at 05:49

## 2019-11-02 RX ADMIN — SIMETHICONE 80 MILLIGRAM(S): 80 TABLET, CHEWABLE ORAL at 00:55

## 2019-11-02 RX ADMIN — SIMETHICONE 80 MILLIGRAM(S): 80 TABLET, CHEWABLE ORAL at 05:49

## 2019-11-02 RX ADMIN — SIMETHICONE 80 MILLIGRAM(S): 80 TABLET, CHEWABLE ORAL at 13:38

## 2019-11-02 RX ADMIN — HEPARIN SODIUM 5000 UNIT(S): 5000 INJECTION INTRAVENOUS; SUBCUTANEOUS at 05:48

## 2019-11-02 RX ADMIN — Medication 975 MILLIGRAM(S): at 13:38

## 2019-11-02 RX ADMIN — Medication 600 MILLIGRAM(S): at 06:00

## 2019-11-02 RX ADMIN — Medication 600 MILLIGRAM(S): at 00:55

## 2019-11-02 NOTE — PROGRESS NOTE ADULT - SUBJECTIVE AND OBJECTIVE BOX
SUBJECTIVE:    Pain: well controlled  Complaints: none    MILESTONES:    Alert and oriented x 3  [ x ]  Out of bed/ ambulating. [ x ]  Flatus: [ x ]  Postive [  ] Negative   Bowel movement  [ x ] Positive [  ] Negative   Voiding [x  ] Due to void [  ]   Diet: Regular [ x ]  Clears [  ]  NPO [  ]  Infant feeding:  Breast [x  ]   Bottle [  ]  Both [  ]  Feeding related inssues and/or concerns: none      OBJECTIVE:  T(C): 36.7 (19 @ 05:26), Max: 36.9 (19 @ 13:15)  HR: 83 (19 @ 10:45) (72 - 83)  BP: 130/80 (19 @ 10:49) (130/80 - 141/88)  RR: 18 (19 @ 05:26) (18 - 18)  SpO2: 100% (19 @ 05:26) (99% - 100%)  Wt(kg): --    MEDICATIONS  (STANDING):  acetaminophen   Tablet .. 975 milliGRAM(s) Oral <User Schedule>  ascorbic acid 500 milliGRAM(s) Oral daily  diphtheria/tetanus/pertussis (acellular) Vaccine (ADAcel) 0.5 milliLiter(s) IntraMuscular once  ferrous    sulfate 325 milliGRAM(s) Oral three times a day  heparin  Injectable 5000 Unit(s) SubCutaneous every 12 hours  ibuprofen  Tablet. 600 milliGRAM(s) Oral every 6 hours  levothyroxine 25 MICROGram(s) Oral daily  prenatal multivitamin 1 Tablet(s) Oral daily    MEDICATIONS  (PRN):  diphenhydrAMINE 25 milliGRAM(s) Oral every 6 hours PRN Itching  diphenhydrAMINE   Injectable 25 milliGRAM(s) IV Push every 4 hours PRN Pruritus  glycerin Suppository - Adult 1 Suppository(s) Rectal at bedtime PRN Constipation  lanolin Ointment 1 Application(s) Topical every 6 hours PRN Sore Nipples  magnesium hydroxide Suspension 30 milliLiter(s) Oral two times a day PRN Constipation  ondansetron Injectable 4 milliGRAM(s) IV Push every 6 hours PRN Nausea  oxyCODONE    IR 5 milliGRAM(s) Oral every 3 hours PRN Moderate Pain (4 - 6)  senna 2 Tablet(s) Oral daily PRN Constipation  simethicone 80 milliGRAM(s) Chew every 4 hours PRN Gas    ASSESSMENT:  29y  y/o G 2  P  2  PO Day#   4     Delivery: Primary [x  ]    Repeat [  ]                                       Indication of procedure: abnormal presentation  Condition: Stable  Past Medical History significant for: HPI:    Current Issues:none  Heart:        RRR                      Lungs: clear  Breasts:  Soft [ x ]   Engorged [  ]  Abdomen: Soft [x  ] , distended [  ] nontender [ x ]   Bowel sounds :  Present [ x ]  Absent [  ]   Fundus firm [x  ]  Boggy [  ]  Abdominal incision: Clean, dry and intact [x  ]  Staples [  ] Steri Strips [  ] Dermabond  Sutures [ x   Patient wearing abdominal binder for support.  Vaginal: Lochia:  Heavy [  ]  Moderate [  ]   Scant [  ]  Extremities: Edema [0 ] negative Negra's Sign [ x ] Nontender Juanito  [ x ] Positive pedal pulses [ x ]  Other relevant physical exam findings:none      PLAN:  Plan: Increase ambulation, analgesia PRN and pain medication protocol standing oxycodone, ibuprofen and acetaminophen.  Diet: Regular diet  Continue routine post-operative and postpartum care.   Discharge Planning [ x ]

## 2019-11-05 ENCOUNTER — APPOINTMENT (OUTPATIENT)
Dept: MATERNAL FETAL MEDICINE | Facility: CLINIC | Age: 29
End: 2019-11-05

## 2019-11-05 ENCOUNTER — APPOINTMENT (OUTPATIENT)
Dept: OBGYN | Facility: CLINIC | Age: 29
End: 2019-11-05

## 2019-11-05 ENCOUNTER — APPOINTMENT (OUTPATIENT)
Dept: ENDOCRINOLOGY | Facility: CLINIC | Age: 29
End: 2019-11-05

## 2019-11-05 ENCOUNTER — APPOINTMENT (OUTPATIENT)
Dept: ANTEPARTUM | Facility: CLINIC | Age: 29
End: 2019-11-05

## 2019-11-14 ENCOUNTER — APPOINTMENT (OUTPATIENT)
Dept: OBGYN | Facility: CLINIC | Age: 29
End: 2019-11-14

## 2019-11-15 LAB — SURGICAL PATHOLOGY STUDY: SIGNIFICANT CHANGE UP

## 2019-11-18 ENCOUNTER — APPOINTMENT (OUTPATIENT)
Dept: OBGYN | Facility: CLINIC | Age: 29
End: 2019-11-18
Payer: MEDICAID

## 2019-11-18 VITALS
HEIGHT: 60 IN | DIASTOLIC BLOOD PRESSURE: 84 MMHG | SYSTOLIC BLOOD PRESSURE: 126 MMHG | BODY MASS INDEX: 27.48 KG/M2 | WEIGHT: 140 LBS

## 2019-11-18 PROCEDURE — 0503F POSTPARTUM CARE VISIT: CPT

## 2019-11-18 NOTE — HISTORY OF PRESENT ILLNESS
[Postpartum Follow Up] : postpartum follow up [Complications:___] : no complications [Delivery Date: ___] : on [unfilled] [Primary C/S] : delivered by  section [Multiples: ___] : Delivery History: [unfilled] babies [BTL] : bilateral tubal ligation completed [Breastfeeding] : not currently nursing [S/Sx PP Depression] : no signs/symptoms of postpartum depression [Clean/Dry/Intact] : clean, dry and intact [Erythema] : not erythematous [Mild] : mild vaginal bleeding [Normal] : the vagina was normal [Cervix Sample Taken] : cervical sample not taken for a Pap smear [Not Done] : Examination of breasts not done [Doing Well] : is doing well [No Sign of Infection] : is showing no signs of infection [Excellent Pain Control] : has excellent pain control [de-identified] : f/u in 4 weeks [None] : None

## 2019-12-11 ENCOUNTER — TRANSCRIPTION ENCOUNTER (OUTPATIENT)
Age: 29
End: 2019-12-11

## 2019-12-12 ENCOUNTER — INPATIENT (INPATIENT)
Facility: HOSPITAL | Age: 29
LOS: 1 days | Discharge: HOME | End: 2019-12-14
Attending: OBSTETRICS & GYNECOLOGY | Admitting: OBSTETRICS & GYNECOLOGY
Payer: MEDICAID

## 2019-12-12 VITALS
RESPIRATION RATE: 16 BRPM | HEART RATE: 92 BPM | OXYGEN SATURATION: 100 % | SYSTOLIC BLOOD PRESSURE: 142 MMHG | DIASTOLIC BLOOD PRESSURE: 98 MMHG

## 2019-12-12 LAB
ALBUMIN SERPL ELPH-MCNC: 3.7 G/DL — SIGNIFICANT CHANGE UP (ref 3.3–5)
ALBUMIN SERPL ELPH-MCNC: 4 G/DL — SIGNIFICANT CHANGE UP (ref 3.3–5)
ALBUMIN SERPL ELPH-MCNC: 4.2 G/DL — SIGNIFICANT CHANGE UP (ref 3.3–5)
ALBUMIN SERPL ELPH-MCNC: 4.3 G/DL — SIGNIFICANT CHANGE UP (ref 3.3–5)
ALP SERPL-CCNC: 48 U/L — SIGNIFICANT CHANGE UP (ref 40–120)
ALP SERPL-CCNC: 53 U/L — SIGNIFICANT CHANGE UP (ref 40–120)
ALP SERPL-CCNC: 88 U/L — SIGNIFICANT CHANGE UP (ref 40–120)
ALP SERPL-CCNC: 93 U/L — SIGNIFICANT CHANGE UP (ref 40–120)
ALT FLD-CCNC: 133 U/L — HIGH (ref 4–33)
ALT FLD-CCNC: 152 U/L — HIGH (ref 4–33)
ALT FLD-CCNC: 66 U/L — HIGH (ref 4–33)
ALT FLD-CCNC: 69 U/L — HIGH (ref 4–33)
ANION GAP SERPL CALC-SCNC: 10 MMO/L — SIGNIFICANT CHANGE UP (ref 7–14)
ANION GAP SERPL CALC-SCNC: 12 MMO/L — SIGNIFICANT CHANGE UP (ref 7–14)
ANION GAP SERPL CALC-SCNC: 13 MMO/L — SIGNIFICANT CHANGE UP (ref 7–14)
ANION GAP SERPL CALC-SCNC: 16 MMO/L — HIGH (ref 7–14)
APTT BLD: 25.2 SEC — LOW (ref 27.5–36.3)
APTT BLD: 25.8 SEC — LOW (ref 27.5–36.3)
APTT BLD: 27.2 SEC — LOW (ref 27.5–36.3)
APTT BLD: 33.2 SEC — SIGNIFICANT CHANGE UP (ref 27.5–36.3)
AST SERPL-CCNC: 44 U/L — HIGH (ref 4–32)
AST SERPL-CCNC: 49 U/L — HIGH (ref 4–32)
AST SERPL-CCNC: 65 U/L — HIGH (ref 4–32)
AST SERPL-CCNC: 72 U/L — HIGH (ref 4–32)
BASE EXCESS BLDA CALC-SCNC: -0.4 MMOL/L — SIGNIFICANT CHANGE UP
BASE EXCESS BLDA CALC-SCNC: -3.1 MMOL/L — SIGNIFICANT CHANGE UP
BASE EXCESS BLDA CALC-SCNC: -3.4 MMOL/L — SIGNIFICANT CHANGE UP
BASE EXCESS BLDV CALC-SCNC: -1.7 MMOL/L — SIGNIFICANT CHANGE UP
BASE EXCESS BLDV CALC-SCNC: 0.5 MMOL/L — SIGNIFICANT CHANGE UP
BASOPHILS # BLD AUTO: 0.01 K/UL — SIGNIFICANT CHANGE UP (ref 0–0.2)
BASOPHILS # BLD AUTO: 0.02 K/UL — SIGNIFICANT CHANGE UP (ref 0–0.2)
BASOPHILS # BLD AUTO: 0.04 K/UL — SIGNIFICANT CHANGE UP (ref 0–0.2)
BASOPHILS # BLD AUTO: 0.05 K/UL — SIGNIFICANT CHANGE UP (ref 0–0.2)
BASOPHILS NFR BLD AUTO: 0.1 % — SIGNIFICANT CHANGE UP (ref 0–2)
BASOPHILS NFR BLD AUTO: 0.2 % — SIGNIFICANT CHANGE UP (ref 0–2)
BASOPHILS NFR BLD AUTO: 0.3 % — SIGNIFICANT CHANGE UP (ref 0–2)
BASOPHILS NFR BLD AUTO: 0.5 % — SIGNIFICANT CHANGE UP (ref 0–2)
BILIRUB SERPL-MCNC: 0.3 MG/DL — SIGNIFICANT CHANGE UP (ref 0.2–1.2)
BILIRUB SERPL-MCNC: 0.4 MG/DL — SIGNIFICANT CHANGE UP (ref 0.2–1.2)
BILIRUB SERPL-MCNC: 1 MG/DL — SIGNIFICANT CHANGE UP (ref 0.2–1.2)
BILIRUB SERPL-MCNC: 1.2 MG/DL — SIGNIFICANT CHANGE UP (ref 0.2–1.2)
BLD GP AB SCN SERPL QL: NEGATIVE — SIGNIFICANT CHANGE UP
BLOOD GAS VENOUS - CREATININE: 0.61 MG/DL — SIGNIFICANT CHANGE UP (ref 0.5–1.3)
BUN SERPL-MCNC: 10 MG/DL — SIGNIFICANT CHANGE UP (ref 7–23)
BUN SERPL-MCNC: 11 MG/DL — SIGNIFICANT CHANGE UP (ref 7–23)
BUN SERPL-MCNC: 14 MG/DL — SIGNIFICANT CHANGE UP (ref 7–23)
BUN SERPL-MCNC: 19 MG/DL — SIGNIFICANT CHANGE UP (ref 7–23)
CA-I BLDA-SCNC: 1.1 MMOL/L — LOW (ref 1.15–1.29)
CA-I BLDA-SCNC: 1.25 MMOL/L — SIGNIFICANT CHANGE UP (ref 1.15–1.29)
CA-I BLDA-SCNC: > 2 MMOL/L — HIGH (ref 1.15–1.29)
CA-I SERPL-SCNC: 1.17 MMOL/L — SIGNIFICANT CHANGE UP (ref 1.15–1.29)
CALCIUM SERPL-MCNC: 8.5 MG/DL — SIGNIFICANT CHANGE UP (ref 8.4–10.5)
CALCIUM SERPL-MCNC: 8.6 MG/DL — SIGNIFICANT CHANGE UP (ref 8.4–10.5)
CALCIUM SERPL-MCNC: 8.9 MG/DL — SIGNIFICANT CHANGE UP (ref 8.4–10.5)
CALCIUM SERPL-MCNC: 9.8 MG/DL — SIGNIFICANT CHANGE UP (ref 8.4–10.5)
CHLORIDE BLDV-SCNC: 105 MMOL/L — SIGNIFICANT CHANGE UP (ref 96–108)
CHLORIDE SERPL-SCNC: 100 MMOL/L — SIGNIFICANT CHANGE UP (ref 98–107)
CHLORIDE SERPL-SCNC: 103 MMOL/L — SIGNIFICANT CHANGE UP (ref 98–107)
CHLORIDE SERPL-SCNC: 107 MMOL/L — SIGNIFICANT CHANGE UP (ref 98–107)
CHLORIDE SERPL-SCNC: 107 MMOL/L — SIGNIFICANT CHANGE UP (ref 98–107)
CO2 SERPL-SCNC: 21 MMOL/L — LOW (ref 22–31)
CO2 SERPL-SCNC: 23 MMOL/L — SIGNIFICANT CHANGE UP (ref 22–31)
CO2 SERPL-SCNC: 23 MMOL/L — SIGNIFICANT CHANGE UP (ref 22–31)
CO2 SERPL-SCNC: 24 MMOL/L — SIGNIFICANT CHANGE UP (ref 22–31)
CREAT SERPL-MCNC: 0.53 MG/DL — SIGNIFICANT CHANGE UP (ref 0.5–1.3)
CREAT SERPL-MCNC: 0.54 MG/DL — SIGNIFICANT CHANGE UP (ref 0.5–1.3)
CREAT SERPL-MCNC: 0.57 MG/DL — SIGNIFICANT CHANGE UP (ref 0.5–1.3)
CREAT SERPL-MCNC: 0.59 MG/DL — SIGNIFICANT CHANGE UP (ref 0.5–1.3)
EOSINOPHIL # BLD AUTO: 0.01 K/UL — SIGNIFICANT CHANGE UP (ref 0–0.5)
EOSINOPHIL # BLD AUTO: 0.01 K/UL — SIGNIFICANT CHANGE UP (ref 0–0.5)
EOSINOPHIL # BLD AUTO: 0.14 K/UL — SIGNIFICANT CHANGE UP (ref 0–0.5)
EOSINOPHIL # BLD AUTO: 0.56 K/UL — HIGH (ref 0–0.5)
EOSINOPHIL NFR BLD AUTO: 0.1 % — SIGNIFICANT CHANGE UP (ref 0–6)
EOSINOPHIL NFR BLD AUTO: 0.1 % — SIGNIFICANT CHANGE UP (ref 0–6)
EOSINOPHIL NFR BLD AUTO: 0.8 % — SIGNIFICANT CHANGE UP (ref 0–6)
EOSINOPHIL NFR BLD AUTO: 6.7 % — HIGH (ref 0–6)
FIBRINOGEN PPP-MCNC: 211 MG/DL — LOW (ref 350–510)
FIBRINOGEN PPP-MCNC: 298 MG/DL — LOW (ref 350–510)
GAS PNL BLDV: 135 MMOL/L — LOW (ref 136–146)
GAS PNL BLDV: 138 MMOL/L — SIGNIFICANT CHANGE UP (ref 136–146)
GLUCOSE BLDA-MCNC: 130 MG/DL — HIGH (ref 70–99)
GLUCOSE BLDA-MCNC: 164 MG/DL — HIGH (ref 70–99)
GLUCOSE BLDA-MCNC: 166 MG/DL — HIGH (ref 70–99)
GLUCOSE BLDV-MCNC: 111 MG/DL — HIGH (ref 70–99)
GLUCOSE BLDV-MCNC: 90 MG/DL — SIGNIFICANT CHANGE UP (ref 70–99)
GLUCOSE SERPL-MCNC: 131 MG/DL — HIGH (ref 70–99)
GLUCOSE SERPL-MCNC: 150 MG/DL — HIGH (ref 70–99)
GLUCOSE SERPL-MCNC: 85 MG/DL — SIGNIFICANT CHANGE UP (ref 70–99)
GLUCOSE SERPL-MCNC: 97 MG/DL — SIGNIFICANT CHANGE UP (ref 70–99)
HCG SERPL-ACNC: < 5 MIU/ML — SIGNIFICANT CHANGE UP
HCO3 BLDA-SCNC: 22 MMOL/L — SIGNIFICANT CHANGE UP (ref 22–26)
HCO3 BLDA-SCNC: 22 MMOL/L — SIGNIFICANT CHANGE UP (ref 22–26)
HCO3 BLDA-SCNC: 24 MMOL/L — SIGNIFICANT CHANGE UP (ref 22–26)
HCO3 BLDV-SCNC: 22 MMOL/L — SIGNIFICANT CHANGE UP (ref 20–27)
HCO3 BLDV-SCNC: 24 MMOL/L — SIGNIFICANT CHANGE UP (ref 20–27)
HCT VFR BLD CALC: 24.9 % — LOW (ref 34.5–45)
HCT VFR BLD CALC: 26.9 % — LOW (ref 34.5–45)
HCT VFR BLD CALC: 35.5 % — SIGNIFICANT CHANGE UP (ref 34.5–45)
HCT VFR BLD CALC: 40.5 % — SIGNIFICANT CHANGE UP (ref 34.5–45)
HCT VFR BLDA CALC: 33 % — LOW (ref 34.5–46.5)
HCT VFR BLDA CALC: 36.1 % — SIGNIFICANT CHANGE UP (ref 34.5–46.5)
HCT VFR BLDA CALC: 36.2 % — SIGNIFICANT CHANGE UP (ref 34.5–46.5)
HCT VFR BLDV CALC: 28 % — LOW (ref 34.5–45)
HCT VFR BLDV CALC: 42.6 % — SIGNIFICANT CHANGE UP (ref 34.5–45)
HGB BLD-MCNC: 11.5 G/DL — SIGNIFICANT CHANGE UP (ref 11.5–15.5)
HGB BLD-MCNC: 13.1 G/DL — SIGNIFICANT CHANGE UP (ref 11.5–15.5)
HGB BLD-MCNC: 8.5 G/DL — LOW (ref 11.5–15.5)
HGB BLD-MCNC: 9.2 G/DL — LOW (ref 11.5–15.5)
HGB BLDA-MCNC: 10.7 G/DL — LOW (ref 11.5–15.5)
HGB BLDA-MCNC: 11.7 G/DL — SIGNIFICANT CHANGE UP (ref 11.5–15.5)
HGB BLDA-MCNC: 11.7 G/DL — SIGNIFICANT CHANGE UP (ref 11.5–15.5)
HGB BLDV-MCNC: 13.9 G/DL — SIGNIFICANT CHANGE UP (ref 11.5–15.5)
HGB BLDV-MCNC: 9 G/DL — LOW (ref 11.5–15.5)
IMM GRANULOCYTES NFR BLD AUTO: 0.1 % — SIGNIFICANT CHANGE UP (ref 0–1.5)
IMM GRANULOCYTES NFR BLD AUTO: 0.4 % — SIGNIFICANT CHANGE UP (ref 0–1.5)
IMM GRANULOCYTES NFR BLD AUTO: 0.4 % — SIGNIFICANT CHANGE UP (ref 0–1.5)
IMM GRANULOCYTES NFR BLD AUTO: 0.5 % — SIGNIFICANT CHANGE UP (ref 0–1.5)
INR BLD: 1.05 — SIGNIFICANT CHANGE UP (ref 0.88–1.17)
INR BLD: 1.23 — HIGH (ref 0.88–1.17)
INR BLD: 1.27 — HIGH (ref 0.88–1.17)
INR BLD: 1.28 — HIGH (ref 0.88–1.17)
LACTATE BLDV-MCNC: 1.2 MMOL/L — SIGNIFICANT CHANGE UP (ref 0.5–2)
LYMPHOCYTES # BLD AUTO: 0.65 K/UL — LOW (ref 1–3.3)
LYMPHOCYTES # BLD AUTO: 0.7 K/UL — LOW (ref 1–3.3)
LYMPHOCYTES # BLD AUTO: 1.23 K/UL — SIGNIFICANT CHANGE UP (ref 1–3.3)
LYMPHOCYTES # BLD AUTO: 2.7 K/UL — SIGNIFICANT CHANGE UP (ref 1–3.3)
LYMPHOCYTES # BLD AUTO: 32.5 % — SIGNIFICANT CHANGE UP (ref 13–44)
LYMPHOCYTES # BLD AUTO: 5.7 % — LOW (ref 13–44)
LYMPHOCYTES # BLD AUTO: 6.1 % — LOW (ref 13–44)
LYMPHOCYTES # BLD AUTO: 7 % — LOW (ref 13–44)
MCHC RBC-ENTMCNC: 26.7 PG — LOW (ref 27–34)
MCHC RBC-ENTMCNC: 28.8 PG — SIGNIFICANT CHANGE UP (ref 27–34)
MCHC RBC-ENTMCNC: 28.9 PG — SIGNIFICANT CHANGE UP (ref 27–34)
MCHC RBC-ENTMCNC: 29.1 PG — SIGNIFICANT CHANGE UP (ref 27–34)
MCHC RBC-ENTMCNC: 32.3 % — SIGNIFICANT CHANGE UP (ref 32–36)
MCHC RBC-ENTMCNC: 32.4 % — SIGNIFICANT CHANGE UP (ref 32–36)
MCHC RBC-ENTMCNC: 34.1 % — SIGNIFICANT CHANGE UP (ref 32–36)
MCHC RBC-ENTMCNC: 34.2 % — SIGNIFICANT CHANGE UP (ref 32–36)
MCV RBC AUTO: 82.5 FL — SIGNIFICANT CHANGE UP (ref 80–100)
MCV RBC AUTO: 84.7 FL — SIGNIFICANT CHANGE UP (ref 80–100)
MCV RBC AUTO: 85.1 FL — SIGNIFICANT CHANGE UP (ref 80–100)
MCV RBC AUTO: 89 FL — SIGNIFICANT CHANGE UP (ref 80–100)
MONOCYTES # BLD AUTO: 0.36 K/UL — SIGNIFICANT CHANGE UP (ref 0–0.9)
MONOCYTES # BLD AUTO: 0.42 K/UL — SIGNIFICANT CHANGE UP (ref 0–0.9)
MONOCYTES # BLD AUTO: 0.43 K/UL — SIGNIFICANT CHANGE UP (ref 0–0.9)
MONOCYTES # BLD AUTO: 0.51 K/UL — SIGNIFICANT CHANGE UP (ref 0–0.9)
MONOCYTES NFR BLD AUTO: 2.4 % — SIGNIFICANT CHANGE UP (ref 2–14)
MONOCYTES NFR BLD AUTO: 3.1 % — SIGNIFICANT CHANGE UP (ref 2–14)
MONOCYTES NFR BLD AUTO: 3.8 % — SIGNIFICANT CHANGE UP (ref 2–14)
MONOCYTES NFR BLD AUTO: 6.1 % — SIGNIFICANT CHANGE UP (ref 2–14)
NEUTROPHILS # BLD AUTO: 10.23 K/UL — HIGH (ref 1.8–7.4)
NEUTROPHILS # BLD AUTO: 10.42 K/UL — HIGH (ref 1.8–7.4)
NEUTROPHILS # BLD AUTO: 15.6 K/UL — HIGH (ref 1.8–7.4)
NEUTROPHILS # BLD AUTO: 4.49 K/UL — SIGNIFICANT CHANGE UP (ref 1.8–7.4)
NEUTROPHILS NFR BLD AUTO: 54.1 % — SIGNIFICANT CHANGE UP (ref 43–77)
NEUTROPHILS NFR BLD AUTO: 89.1 % — HIGH (ref 43–77)
NEUTROPHILS NFR BLD AUTO: 89.9 % — HIGH (ref 43–77)
NEUTROPHILS NFR BLD AUTO: 90 % — HIGH (ref 43–77)
NRBC # FLD: 0 K/UL — SIGNIFICANT CHANGE UP (ref 0–0)
PCO2 BLDA: 36 MMHG — SIGNIFICANT CHANGE UP (ref 32–48)
PCO2 BLDA: 37 MMHG — SIGNIFICANT CHANGE UP (ref 32–48)
PCO2 BLDA: 38 MMHG — SIGNIFICANT CHANGE UP (ref 32–48)
PCO2 BLDV: 47 MMHG — SIGNIFICANT CHANGE UP (ref 41–51)
PCO2 BLDV: 56 MMHG — HIGH (ref 41–51)
PH BLDA: 7.38 PH — SIGNIFICANT CHANGE UP (ref 7.35–7.45)
PH BLDA: 7.38 PH — SIGNIFICANT CHANGE UP (ref 7.35–7.45)
PH BLDA: 7.41 PH — SIGNIFICANT CHANGE UP (ref 7.35–7.45)
PH BLDV: 7.26 PH — LOW (ref 7.32–7.43)
PH BLDV: 7.35 PH — SIGNIFICANT CHANGE UP (ref 7.32–7.43)
PLATELET # BLD AUTO: 154 K/UL — SIGNIFICANT CHANGE UP (ref 150–400)
PLATELET # BLD AUTO: 173 K/UL — SIGNIFICANT CHANGE UP (ref 150–400)
PLATELET # BLD AUTO: 201 K/UL — SIGNIFICANT CHANGE UP (ref 150–400)
PLATELET # BLD AUTO: 289 K/UL — SIGNIFICANT CHANGE UP (ref 150–400)
PMV BLD: 9.1 FL — SIGNIFICANT CHANGE UP (ref 7–13)
PMV BLD: 9.7 FL — SIGNIFICANT CHANGE UP (ref 7–13)
PO2 BLDA: 231 MMHG — HIGH (ref 83–108)
PO2 BLDA: 269 MMHG — HIGH (ref 83–108)
PO2 BLDA: 367 MMHG — HIGH (ref 83–108)
PO2 BLDV: 41 MMHG — HIGH (ref 35–40)
PO2 BLDV: 57 MMHG — HIGH (ref 35–40)
POTASSIUM BLDA-SCNC: 4.1 MMOL/L — SIGNIFICANT CHANGE UP (ref 3.4–4.5)
POTASSIUM BLDA-SCNC: 4.1 MMOL/L — SIGNIFICANT CHANGE UP (ref 3.4–4.5)
POTASSIUM BLDA-SCNC: 4.2 MMOL/L — SIGNIFICANT CHANGE UP (ref 3.4–4.5)
POTASSIUM BLDV-SCNC: 3.4 MMOL/L — SIGNIFICANT CHANGE UP (ref 3.4–4.5)
POTASSIUM BLDV-SCNC: 4.6 MMOL/L — HIGH (ref 3.4–4.5)
POTASSIUM SERPL-MCNC: 3.6 MMOL/L — SIGNIFICANT CHANGE UP (ref 3.5–5.3)
POTASSIUM SERPL-MCNC: 3.9 MMOL/L — SIGNIFICANT CHANGE UP (ref 3.5–5.3)
POTASSIUM SERPL-MCNC: 3.9 MMOL/L — SIGNIFICANT CHANGE UP (ref 3.5–5.3)
POTASSIUM SERPL-MCNC: 4 MMOL/L — SIGNIFICANT CHANGE UP (ref 3.5–5.3)
POTASSIUM SERPL-SCNC: 3.6 MMOL/L — SIGNIFICANT CHANGE UP (ref 3.5–5.3)
POTASSIUM SERPL-SCNC: 3.9 MMOL/L — SIGNIFICANT CHANGE UP (ref 3.5–5.3)
POTASSIUM SERPL-SCNC: 3.9 MMOL/L — SIGNIFICANT CHANGE UP (ref 3.5–5.3)
POTASSIUM SERPL-SCNC: 4 MMOL/L — SIGNIFICANT CHANGE UP (ref 3.5–5.3)
PROT SERPL-MCNC: 5.4 G/DL — LOW (ref 6–8.3)
PROT SERPL-MCNC: 6.1 G/DL — SIGNIFICANT CHANGE UP (ref 6–8.3)
PROT SERPL-MCNC: 6.8 G/DL — SIGNIFICANT CHANGE UP (ref 6–8.3)
PROT SERPL-MCNC: 7.7 G/DL — SIGNIFICANT CHANGE UP (ref 6–8.3)
PROTHROM AB SERPL-ACNC: 11.7 SEC — SIGNIFICANT CHANGE UP (ref 9.8–13.1)
PROTHROM AB SERPL-ACNC: 14.1 SEC — HIGH (ref 9.8–13.1)
PROTHROM AB SERPL-ACNC: 14.3 SEC — HIGH (ref 9.8–13.1)
PROTHROM AB SERPL-ACNC: 14.6 SEC — HIGH (ref 9.8–13.1)
RBC # BLD: 2.94 M/UL — LOW (ref 3.8–5.2)
RBC # BLD: 3.16 M/UL — LOW (ref 3.8–5.2)
RBC # BLD: 3.99 M/UL — SIGNIFICANT CHANGE UP (ref 3.8–5.2)
RBC # BLD: 4.91 M/UL — SIGNIFICANT CHANGE UP (ref 3.8–5.2)
RBC # FLD: 13.9 % — SIGNIFICANT CHANGE UP (ref 10.3–14.5)
RBC # FLD: 14.2 % — SIGNIFICANT CHANGE UP (ref 10.3–14.5)
RBC # FLD: 14.2 % — SIGNIFICANT CHANGE UP (ref 10.3–14.5)
RBC # FLD: 14.4 % — SIGNIFICANT CHANGE UP (ref 10.3–14.5)
RH IG SCN BLD-IMP: POSITIVE — SIGNIFICANT CHANGE UP
SAO2 % BLDA: 99.5 % — HIGH (ref 95–99)
SAO2 % BLDA: 99.7 % — HIGH (ref 95–99)
SAO2 % BLDA: 99.8 % — HIGH (ref 95–99)
SAO2 % BLDV: 66.1 % — SIGNIFICANT CHANGE UP (ref 60–85)
SAO2 % BLDV: 82.4 % — SIGNIFICANT CHANGE UP (ref 60–85)
SODIUM BLDA-SCNC: 136 MMOL/L — SIGNIFICANT CHANGE UP (ref 136–146)
SODIUM BLDA-SCNC: 136 MMOL/L — SIGNIFICANT CHANGE UP (ref 136–146)
SODIUM BLDA-SCNC: 137 MMOL/L — SIGNIFICANT CHANGE UP (ref 136–146)
SODIUM SERPL-SCNC: 137 MMOL/L — SIGNIFICANT CHANGE UP (ref 135–145)
SODIUM SERPL-SCNC: 139 MMOL/L — SIGNIFICANT CHANGE UP (ref 135–145)
SODIUM SERPL-SCNC: 141 MMOL/L — SIGNIFICANT CHANGE UP (ref 135–145)
SODIUM SERPL-SCNC: 142 MMOL/L — SIGNIFICANT CHANGE UP (ref 135–145)
WBC # BLD: 11.37 K/UL — HIGH (ref 3.8–10.5)
WBC # BLD: 11.57 K/UL — HIGH (ref 3.8–10.5)
WBC # BLD: 17.51 K/UL — HIGH (ref 3.8–10.5)
WBC # BLD: 8.31 K/UL — SIGNIFICANT CHANGE UP (ref 3.8–10.5)
WBC # FLD AUTO: 11.37 K/UL — HIGH (ref 3.8–10.5)
WBC # FLD AUTO: 11.57 K/UL — HIGH (ref 3.8–10.5)
WBC # FLD AUTO: 17.51 K/UL — HIGH (ref 3.8–10.5)
WBC # FLD AUTO: 8.31 K/UL — SIGNIFICANT CHANGE UP (ref 3.8–10.5)

## 2019-12-12 PROCEDURE — 58150 TOTAL HYSTERECTOMY: CPT

## 2019-12-12 PROCEDURE — 93975 VASCULAR STUDY: CPT

## 2019-12-12 PROCEDURE — 76830 TRANSVAGINAL US NON-OB: CPT | Mod: 26

## 2019-12-12 RX ORDER — HYDROMORPHONE HYDROCHLORIDE 2 MG/ML
30 INJECTION INTRAMUSCULAR; INTRAVENOUS; SUBCUTANEOUS
Refills: 0 | Status: DISCONTINUED | OUTPATIENT
Start: 2019-12-12 | End: 2019-12-12

## 2019-12-12 RX ORDER — ACETAMINOPHEN 500 MG
1000 TABLET ORAL ONCE
Refills: 0 | Status: COMPLETED | OUTPATIENT
Start: 2019-12-13 | End: 2019-12-13

## 2019-12-12 RX ORDER — ACETAMINOPHEN 500 MG
1000 TABLET ORAL ONCE
Refills: 0 | Status: COMPLETED | OUTPATIENT
Start: 2019-12-12 | End: 2019-12-12

## 2019-12-12 RX ORDER — OXYCODONE HYDROCHLORIDE 5 MG/1
5 TABLET ORAL EVERY 4 HOURS
Refills: 0 | Status: DISCONTINUED | OUTPATIENT
Start: 2019-12-12 | End: 2019-12-14

## 2019-12-12 RX ORDER — SODIUM CHLORIDE 9 MG/ML
1000 INJECTION INTRAMUSCULAR; INTRAVENOUS; SUBCUTANEOUS ONCE
Refills: 0 | Status: COMPLETED | OUTPATIENT
Start: 2019-12-12 | End: 2019-12-12

## 2019-12-12 RX ORDER — ACETAMINOPHEN 500 MG
975 TABLET ORAL EVERY 6 HOURS
Refills: 0 | Status: DISCONTINUED | OUTPATIENT
Start: 2019-12-13 | End: 2019-12-14

## 2019-12-12 RX ORDER — IBUPROFEN 200 MG
600 TABLET ORAL EVERY 6 HOURS
Refills: 0 | Status: DISCONTINUED | OUTPATIENT
Start: 2019-12-13 | End: 2019-12-14

## 2019-12-12 RX ORDER — OXYCODONE HYDROCHLORIDE 5 MG/1
2.5 TABLET ORAL EVERY 4 HOURS
Refills: 0 | Status: DISCONTINUED | OUTPATIENT
Start: 2019-12-12 | End: 2019-12-14

## 2019-12-12 RX ORDER — ONDANSETRON 8 MG/1
4 TABLET, FILM COATED ORAL EVERY 6 HOURS
Refills: 0 | Status: DISCONTINUED | OUTPATIENT
Start: 2019-12-12 | End: 2019-12-12

## 2019-12-12 RX ORDER — HEPARIN SODIUM 5000 [USP'U]/ML
5000 INJECTION INTRAVENOUS; SUBCUTANEOUS ONCE
Refills: 0 | Status: DISCONTINUED | OUTPATIENT
Start: 2019-12-12 | End: 2019-12-12

## 2019-12-12 RX ORDER — ENOXAPARIN SODIUM 100 MG/ML
40 INJECTION SUBCUTANEOUS EVERY 24 HOURS
Refills: 0 | Status: DISCONTINUED | OUTPATIENT
Start: 2019-12-13 | End: 2019-12-13

## 2019-12-12 RX ORDER — HYDROMORPHONE HYDROCHLORIDE 2 MG/ML
1 INJECTION INTRAMUSCULAR; INTRAVENOUS; SUBCUTANEOUS
Refills: 0 | Status: DISCONTINUED | OUTPATIENT
Start: 2019-12-12 | End: 2019-12-12

## 2019-12-12 RX ORDER — ONDANSETRON 8 MG/1
4 TABLET, FILM COATED ORAL ONCE
Refills: 0 | Status: COMPLETED | OUTPATIENT
Start: 2019-12-12 | End: 2019-12-12

## 2019-12-12 RX ORDER — HYDROMORPHONE HYDROCHLORIDE 2 MG/ML
0.5 INJECTION INTRAMUSCULAR; INTRAVENOUS; SUBCUTANEOUS
Refills: 0 | Status: DISCONTINUED | OUTPATIENT
Start: 2019-12-12 | End: 2019-12-12

## 2019-12-12 RX ORDER — KETOROLAC TROMETHAMINE 30 MG/ML
30 SYRINGE (ML) INJECTION EVERY 6 HOURS
Refills: 0 | Status: DISCONTINUED | OUTPATIENT
Start: 2019-12-12 | End: 2019-12-13

## 2019-12-12 RX ORDER — FENTANYL CITRATE 50 UG/ML
50 INJECTION INTRAVENOUS
Refills: 0 | Status: DISCONTINUED | OUTPATIENT
Start: 2019-12-12 | End: 2019-12-12

## 2019-12-12 RX ORDER — NALOXONE HYDROCHLORIDE 4 MG/.1ML
0.1 SPRAY NASAL
Refills: 0 | Status: DISCONTINUED | OUTPATIENT
Start: 2019-12-12 | End: 2019-12-12

## 2019-12-12 RX ORDER — SODIUM CHLORIDE 9 MG/ML
1000 INJECTION, SOLUTION INTRAVENOUS
Refills: 0 | Status: DISCONTINUED | OUTPATIENT
Start: 2019-12-12 | End: 2019-12-13

## 2019-12-12 RX ORDER — HEPARIN SODIUM 5000 [USP'U]/ML
5000 INJECTION INTRAVENOUS; SUBCUTANEOUS ONCE
Refills: 0 | Status: COMPLETED | OUTPATIENT
Start: 2019-12-12 | End: 2019-12-12

## 2019-12-12 RX ADMIN — Medication 30 MILLIGRAM(S): at 18:15

## 2019-12-12 RX ADMIN — SODIUM CHLORIDE 125 MILLILITER(S): 9 INJECTION, SOLUTION INTRAVENOUS at 18:00

## 2019-12-12 RX ADMIN — Medication 400 MILLIGRAM(S): at 23:57

## 2019-12-12 RX ADMIN — SODIUM CHLORIDE 125 MILLILITER(S): 9 INJECTION, SOLUTION INTRAVENOUS at 23:58

## 2019-12-12 RX ADMIN — ONDANSETRON 4 MILLIGRAM(S): 8 TABLET, FILM COATED ORAL at 20:00

## 2019-12-12 RX ADMIN — Medication 30 MILLIGRAM(S): at 23:57

## 2019-12-12 RX ADMIN — Medication 1000 MILLIGRAM(S): at 18:00

## 2019-12-12 RX ADMIN — Medication 30 MILLIGRAM(S): at 18:00

## 2019-12-12 RX ADMIN — SODIUM CHLORIDE 1000 MILLILITER(S): 9 INJECTION INTRAMUSCULAR; INTRAVENOUS; SUBCUTANEOUS at 04:21

## 2019-12-12 RX ADMIN — HEPARIN SODIUM 5000 UNIT(S): 5000 INJECTION INTRAVENOUS; SUBCUTANEOUS at 23:58

## 2019-12-12 RX ADMIN — Medication 400 MILLIGRAM(S): at 17:45

## 2019-12-12 NOTE — CHART NOTE - NSCHARTNOTEFT_GEN_A_CORE
Backnote due to patient care.  Pt consented for Dilation and Curettage for presumed retained products of conception.  Upon entry into the uterine cavity, tissue and membranes removed, however, uterus not kaden well.  Tranexemic acid was given as well as methergine and hemabate.  Sonogram brought into OR for continued curettage.  The uterus remained intermittently boggy with brisk bleeding.  Pt was becoming hypotensive and tachycardic and decision made to exit the OR to discuss proceeding with hysterectomy with the patients .  He consented and decision was made to proceed with Total Abdominal Hysterectomy.  Pt had tubal ligation at the time of her  section 6 weeks ago.

## 2019-12-12 NOTE — ED PROVIDER NOTE - NS ED ROS FT
ROS:  GENERAL: No fever, no chills  EYES: no change in vision  HEENT: no trouble swallowing, no trouble speaking  CARDIAC: no chest pain  PULMONARY: no cough, no shortness of breath  GI: no abdominal pain, no nausea, no vomiting, no diarrhea, no constipation  : +vaginal bleeding. No dysuria, no frequency, no change in appearance, or odor of urine  SKIN: no rashes  NEURO: no headache, no weakness  MSK: No joint pain    Abiel Coelho PGY2

## 2019-12-12 NOTE — ED PROVIDER NOTE - ATTENDING CONTRIBUTION TO CARE
HPI: 28 y/o F  6 week post-partum female s/p emergency Csection secondary to twin gestation leading to early water breaking with PMH of hypothyroidism p/w vaginal bleeding. Reports scant persistent vaginal bleeding since giving birth. Yesterday passed a "tissue-like" substance and since then has experienced larger volume bright red vaginal bleeding, soaking through 5 pads in last few hours Denies any other symptoms including fever, urinary symptoms, lightheadedness, chest pain, SOB, abd pain, N/V/D.  EXAM: NAD, abd distended but soft. heart RRR, lungs ctab. Pelvic deferred. No pitting edema BLE.   MDM: pt with vaginal bleeding post partum 6 wks, delivered emergency Csection here at [facility]. Since delivery has had bleeding which has been fluctuant. Will need to r/o retained products. Will need labs, imaging and most likely consult OBGYN. Private OBGYN is Dr Robby lAex.

## 2019-12-12 NOTE — ED ADULT NURSE NOTE - NSIMPLEMENTINTERV_GEN_ALL_ED
Implemented All Fall with Harm Risk Interventions:  [place] to call system. Call bell, personal items and telephone within reach. Instruct patient to call for assistance. Room bathroom lighting operational. Non-slip footwear when patient is off stretcher. Physically safe environment: no spills, clutter or unnecessary equipment. Stretcher in lowest position, wheels locked, appropriate side rails in place. Provide visual cue, wrist band, yellow gown, etc. Monitor gait and stability. Monitor for mental status changes and reorient to person, place, and time. Review medications for side effects contributing to fall risk. Reinforce activity limits and safety measures with patient and family. Provide visual clues: red socks.

## 2019-12-12 NOTE — BRIEF OPERATIVE NOTE - NSICDXBRIEFPREOP_GEN_ALL_CORE_FT
PRE-OP DIAGNOSIS:  Uterine bleeding 12-Dec-2019 14:52:22  Abiel Ceron  Retained products of conception, postpartum 12-Dec-2019 14:52:09  Abiel Ceron

## 2019-12-12 NOTE — H&P ADULT - PROBLEM SELECTOR PLAN 1
- Add on to OR for D&C  - Patient to remain NPO (NPO at 10am)  - All risks/benefits/alternatives discussed with patient  FABIOLA Suarez PGY2  d/w Dr. Alex

## 2019-12-12 NOTE — BRIEF OPERATIVE NOTE - NSICDXBRIEFPOSTOP_GEN_ALL_CORE_FT
POST-OP DIAGNOSIS:  Uterine hemorrhage 12-Dec-2019 14:53:47  Abiel Ceron  Uterine mass 12-Dec-2019 14:52:42 ill defined 4 cm ovoid solid mass within uterine cavity, possibly retained products of conception but unclear based on appearance Abiel Ceron

## 2019-12-12 NOTE — ED PROVIDER NOTE - CLINICAL SUMMARY MEDICAL DECISION MAKING FREE TEXT BOX
Post-partum vaginal bleeding. Pt well appearing, HD stable, otherwise asymptomatic. Will assess for anemia and retained products of conception with labs, US.

## 2019-12-12 NOTE — BRIEF OPERATIVE NOTE - OPERATION/FINDINGS
Retained products of conception on D&C with active vaginal bleeding. Upon presumed completion of D&C, intracavitary mass was still visible on ultrasound and uterus continued to have brisk bleeding. On laparotomy, 2 cm subcutaneous seroma, 1 cm fundal uterine perforation, small remnant of right fimbria, surgically absent fallopian tubes b/l otherwise, normal ovaries b/l, thin tissue at site of  hysterotomy, slight adhesions of bladder to  hysterotomy site, normal small bowel, rectum and sigmoid colon upon running the bowel, 4 cm solid mass within the uterine cavity on gross examination after removal of uterus, no attachment to endometrium or myometrium, appearance of tissue did not clearly reveal source but possibly could be retained products of conception

## 2019-12-12 NOTE — BRIEF OPERATIVE NOTE - NSICDXBRIEFPROCEDURE_GEN_ALL_CORE_FT
PROCEDURES:  Open total abdominal hysterectomy 12-Dec-2019 14:51:52  Abiel Ceron  Dilation and curettage, uterus, using ultrasound guidance 12-Dec-2019 14:51:35  Abiel Ceron

## 2019-12-12 NOTE — ED ADULT NURSE NOTE - OBJECTIVE STATEMENT
received pt to room 24 aox4 in no apparent distress VSS C/O heavy vaginal bleeding x 2 hours. Pt post c section twin delivery 5 weeks ago. Pt states had regular bleeding that turned into dark brown but turned back to bright red with clots a week ago and then awoke this morning with heavier bleeding and states she thinks she passed what looked like tissue. Pt endorses some fatigue but otherwise feels normal. Denies chest pain, weakness, SOB, back pain, fevers, chills. Breaths equal and unlabored pt noted to have saturated pad states saturated 4 pads in 2 hours. 20 G PIV R AC placed labs sent fluids running will continue to monitor

## 2019-12-12 NOTE — BRIEF OPERATIVE NOTE - ANTIBIOTIC PROTOCOL
Ancef given at time of decisio nfor hysterectomy/Followed protocol Followed protocol/Ancef given at time of decision for hysterectomy

## 2019-12-12 NOTE — H&P ADULT - NSHPLABSRESULTS_GEN_ALL_CORE
LABS:                              13.1   8.31  )-----------( 289      ( 12 Dec 2019 04:00 )             40.5     12-12    137  |  100  |  19  ----------------------------<  97  3.6   |  21<L>  |  0.59    Ca    9.8      12 Dec 2019 04:00    TPro  7.7  /  Alb  4.3  /  TBili  0.3  /  DBili  x   /  AST  72<H>  /  ALT  152<H>  /  AlkPhos  93  12-12    I&O's Detail    PT/INR - ( 12 Dec 2019 04:00 )   PT: 11.7 SEC;   INR: 1.05          PTT - ( 12 Dec 2019 04:00 )  PTT:33.2 SEC      RADIOLOGY & ADDITIONAL STUDIES:  < from: US Transvaginal (12.12.19 @ 05:39) >    FINDINGS:    Uterus: 11.6 x 6.5 x 8.9 cm. No myometrial mass.    Endometrium: Heterogeneously thickened endometrium measuring up to 35 mm   with associated increased color flow with a peak systolic velocity of up   to 15.8 cm/s concerning for retained products of conception.    Right ovary: 4.0 x 1.9 x 3.8 cm. Within normal limits. Normal arterial   and venous waveforms.    Left ovary: 3.8 x 2.7 x 2.5 cm. Within normal limits. Normal arterial and   venous waveforms.    Fluid: None.    IMPRESSION:    Heterogeneously thickened endometrium with associated increased color   flow concerning for retained products of conception.              RAND RODRIGUES M.D., RADIOLOGY RESIDENT  This document has been electronically signed.  SIRIA SEALS M.D., ATTENDING RADIOLOGIST  This document has been electronically signed. Dec 12 2019  6:02AM    < end of copied text >

## 2019-12-12 NOTE — ED PROVIDER NOTE - CARE PLAN
Principal Discharge DX:	Retained prod concept-del w/ postpartum complication  Secondary Diagnosis:	Vaginal bleeding

## 2019-12-12 NOTE — H&P ADULT - NSHPPHYSICALEXAM_GEN_ALL_CORE
Vital Signs Last 24 Hrs  T(C): 36.7 (12 Dec 2019 07:36), Max: 36.7 (12 Dec 2019 04:36)  T(F): 98.1 (12 Dec 2019 07:36), Max: 98.1 (12 Dec 2019 07:36)  HR: 80 (12 Dec 2019 07:36) (80 - 92)  BP: 126/86 (12 Dec 2019 07:36) (126/86 - 142/98)  BP(mean): --  RR: 16 (12 Dec 2019 07:36) (16 - 16)  SpO2: 100% (12 Dec 2019 07:36) (100% - 100%)    Physical Exam:   General: sitting comfortably in bed, NAD   CV: RR  Lungs: unlabored  Abd: Soft, non-tender, non-distended.  Bowel sounds present.    :  Moderate bleeding on pad.  External labia wnl.  Bimanual exam with no cervical motion tenderness, uterus 10 weeks in size. adnexa non palpable b/l.  Cervix closed   Speculum Exam: 50 cc of clots expressed. Mild active bleeding from os.    Ext: non-tender b/l, no edema

## 2019-12-12 NOTE — ED PROVIDER NOTE - PHYSICAL EXAMINATION
Gen: AAOx3, non-toxic  Head: NCAT  HEENT: EOMI, oral mucosa moist, normal conjunctiva  Lung: CTAB, no respiratory distress, no wheezes/rhonchi/rales B/L, speaking in full sentences  CV: RRR, no murmurs, rubs or gallops  Abd: soft, NTND, no guarding, no CVA tenderness  MSK: no visible deformities  Neuro: No focal sensory or motor deficits, normal CN exam   Skin: Warm, well perfused, no rash  Psych: normal affect.     ~Abiel Coelho PGY2

## 2019-12-12 NOTE — ED PROVIDER NOTE - OBJECTIVE STATEMENT
28 y/o  6 week post-partum female with PMH of hypothyroidism p/w vaginal bleeding. Reports scant persistent vaginal bleeding since giving birth. Yesterday passed a "tissue-like" substance and since then has experienced larger volume bright red vaginal bleeding, soaking through 4 pads since last night. Denies any other symptoms including fever, urinary symptoms, lightheadedness, chest pain, SOB, abd pain, N/V/D.

## 2019-12-12 NOTE — ED ADULT NURSE REASSESSMENT NOTE - NS ED NURSE REASSESS COMMENT FT1
Handoff report received from night shift RN. Patient A&Ox4, respirations even and unlabored, skin good for color. Patient denies any complaints. Will continue to monitor patient.

## 2019-12-12 NOTE — H&P ADULT - HISTORY OF PRESENT ILLNESS
RONIT SIMPSON  29y  Female [id number]    HPI:  30 y/o  s/p pLTCS for mono/di TIUP on  presents for one day of vaginal bleeding with passage of clots this am. Patient states she filled up 6 pads in one hour. She is still breast feeding every 3-4 hours. She denies CP/SOB/dizziness/lightheadedness/fevers/chills/palpitations.         Name of GYN Physician: Dr Alex    POB:   , pLTCS for monodi TIUP     Pgyn: Denies fibroids, cysts, endometriosis, STI's, Abnormal pap smears     Home meds: Synthroid     (PRN):      Allergies    No Known Allergies          PAST MEDICAL & SURGICAL HISTORY:  Vaginal delivery: 17  FT PEC  Hypothyroidism  No significant past surgical history      FAMILY HISTORY:  No pertinent family history in first degree relatives      Social History:  Denies smoking use, drug use, alcohol use.

## 2019-12-13 ENCOUNTER — TRANSCRIPTION ENCOUNTER (OUTPATIENT)
Age: 29
End: 2019-12-13

## 2019-12-13 ENCOUNTER — RESULT REVIEW (OUTPATIENT)
Age: 29
End: 2019-12-13

## 2019-12-13 LAB
ALBUMIN SERPL ELPH-MCNC: 3.2 G/DL — LOW (ref 3.3–5)
ALP SERPL-CCNC: 47 U/L — SIGNIFICANT CHANGE UP (ref 40–120)
ALT FLD-CCNC: 54 U/L — HIGH (ref 4–33)
ANION GAP SERPL CALC-SCNC: 11 MMO/L — SIGNIFICANT CHANGE UP (ref 7–14)
AST SERPL-CCNC: 33 U/L — HIGH (ref 4–32)
BASOPHILS # BLD AUTO: 0.02 K/UL — SIGNIFICANT CHANGE UP (ref 0–0.2)
BASOPHILS # BLD AUTO: 0.03 K/UL — SIGNIFICANT CHANGE UP (ref 0–0.2)
BASOPHILS NFR BLD AUTO: 0.3 % — SIGNIFICANT CHANGE UP (ref 0–2)
BASOPHILS NFR BLD AUTO: 0.5 % — SIGNIFICANT CHANGE UP (ref 0–2)
BILIRUB SERPL-MCNC: 1 MG/DL — SIGNIFICANT CHANGE UP (ref 0.2–1.2)
BUN SERPL-MCNC: 9 MG/DL — SIGNIFICANT CHANGE UP (ref 7–23)
CALCIUM SERPL-MCNC: 8.2 MG/DL — LOW (ref 8.4–10.5)
CHLORIDE SERPL-SCNC: 106 MMOL/L — SIGNIFICANT CHANGE UP (ref 98–107)
CO2 SERPL-SCNC: 23 MMOL/L — SIGNIFICANT CHANGE UP (ref 22–31)
CREAT SERPL-MCNC: 0.66 MG/DL — SIGNIFICANT CHANGE UP (ref 0.5–1.3)
EOSINOPHIL # BLD AUTO: 0.01 K/UL — SIGNIFICANT CHANGE UP (ref 0–0.5)
EOSINOPHIL # BLD AUTO: 0.03 K/UL — SIGNIFICANT CHANGE UP (ref 0–0.5)
EOSINOPHIL NFR BLD AUTO: 0.1 % — SIGNIFICANT CHANGE UP (ref 0–6)
EOSINOPHIL NFR BLD AUTO: 0.5 % — SIGNIFICANT CHANGE UP (ref 0–6)
GLUCOSE SERPL-MCNC: 93 MG/DL — SIGNIFICANT CHANGE UP (ref 70–99)
HCT VFR BLD CALC: 20.7 % — CRITICAL LOW (ref 34.5–45)
HCT VFR BLD CALC: 21 % — CRITICAL LOW (ref 34.5–45)
HGB BLD-MCNC: 7 G/DL — CRITICAL LOW (ref 11.5–15.5)
HGB BLD-MCNC: 7.1 G/DL — LOW (ref 11.5–15.5)
IMM GRANULOCYTES NFR BLD AUTO: 0.3 % — SIGNIFICANT CHANGE UP (ref 0–1.5)
IMM GRANULOCYTES NFR BLD AUTO: 0.5 % — SIGNIFICANT CHANGE UP (ref 0–1.5)
LYMPHOCYTES # BLD AUTO: 1.57 K/UL — SIGNIFICANT CHANGE UP (ref 1–3.3)
LYMPHOCYTES # BLD AUTO: 1.91 K/UL — SIGNIFICANT CHANGE UP (ref 1–3.3)
LYMPHOCYTES # BLD AUTO: 25.7 % — SIGNIFICANT CHANGE UP (ref 13–44)
LYMPHOCYTES # BLD AUTO: 27 % — SIGNIFICANT CHANGE UP (ref 13–44)
MCHC RBC-ENTMCNC: 28.5 PG — SIGNIFICANT CHANGE UP (ref 27–34)
MCHC RBC-ENTMCNC: 28.9 PG — SIGNIFICANT CHANGE UP (ref 27–34)
MCHC RBC-ENTMCNC: 33.8 % — SIGNIFICANT CHANGE UP (ref 32–36)
MCHC RBC-ENTMCNC: 33.8 % — SIGNIFICANT CHANGE UP (ref 32–36)
MCV RBC AUTO: 84.3 FL — SIGNIFICANT CHANGE UP (ref 80–100)
MCV RBC AUTO: 85.5 FL — SIGNIFICANT CHANGE UP (ref 80–100)
MONOCYTES # BLD AUTO: 0.47 K/UL — SIGNIFICANT CHANGE UP (ref 0–0.9)
MONOCYTES # BLD AUTO: 0.51 K/UL — SIGNIFICANT CHANGE UP (ref 0–0.9)
MONOCYTES NFR BLD AUTO: 7.2 % — SIGNIFICANT CHANGE UP (ref 2–14)
MONOCYTES NFR BLD AUTO: 7.7 % — SIGNIFICANT CHANGE UP (ref 2–14)
NEUTROPHILS # BLD AUTO: 3.98 K/UL — SIGNIFICANT CHANGE UP (ref 1.8–7.4)
NEUTROPHILS # BLD AUTO: 4.6 K/UL — SIGNIFICANT CHANGE UP (ref 1.8–7.4)
NEUTROPHILS NFR BLD AUTO: 65.1 % — SIGNIFICANT CHANGE UP (ref 43–77)
NEUTROPHILS NFR BLD AUTO: 65.1 % — SIGNIFICANT CHANGE UP (ref 43–77)
NRBC # FLD: 0 K/UL — SIGNIFICANT CHANGE UP (ref 0–0)
NRBC # FLD: 0 K/UL — SIGNIFICANT CHANGE UP (ref 0–0)
PLATELET # BLD AUTO: 148 K/UL — LOW (ref 150–400)
PLATELET # BLD AUTO: 170 K/UL — SIGNIFICANT CHANGE UP (ref 150–400)
PMV BLD: 9 FL — SIGNIFICANT CHANGE UP (ref 7–13)
PMV BLD: 9.5 FL — SIGNIFICANT CHANGE UP (ref 7–13)
POTASSIUM SERPL-MCNC: 4 MMOL/L — SIGNIFICANT CHANGE UP (ref 3.5–5.3)
POTASSIUM SERPL-SCNC: 4 MMOL/L — SIGNIFICANT CHANGE UP (ref 3.5–5.3)
PROT SERPL-MCNC: 5 G/DL — LOW (ref 6–8.3)
RBC # BLD: 2.42 M/UL — LOW (ref 3.8–5.2)
RBC # BLD: 2.49 M/UL — LOW (ref 3.8–5.2)
RBC # FLD: 14.3 % — SIGNIFICANT CHANGE UP (ref 10.3–14.5)
RBC # FLD: 14.4 % — SIGNIFICANT CHANGE UP (ref 10.3–14.5)
SODIUM SERPL-SCNC: 140 MMOL/L — SIGNIFICANT CHANGE UP (ref 135–145)
WBC # BLD: 6.11 K/UL — SIGNIFICANT CHANGE UP (ref 3.8–10.5)
WBC # BLD: 7.07 K/UL — SIGNIFICANT CHANGE UP (ref 3.8–10.5)
WBC # FLD AUTO: 6.11 K/UL — SIGNIFICANT CHANGE UP (ref 3.8–10.5)
WBC # FLD AUTO: 7.07 K/UL — SIGNIFICANT CHANGE UP (ref 3.8–10.5)

## 2019-12-13 PROCEDURE — 88307 TISSUE EXAM BY PATHOLOGIST: CPT | Mod: 26

## 2019-12-13 PROCEDURE — 88305 TISSUE EXAM BY PATHOLOGIST: CPT | Mod: 26

## 2019-12-13 RX ORDER — ENOXAPARIN SODIUM 100 MG/ML
40 INJECTION SUBCUTANEOUS EVERY 24 HOURS
Refills: 0 | Status: DISCONTINUED | OUTPATIENT
Start: 2019-12-13 | End: 2019-12-14

## 2019-12-13 RX ORDER — INFLUENZA VIRUS VACCINE 15; 15; 15; 15 UG/.5ML; UG/.5ML; UG/.5ML; UG/.5ML
0.5 SUSPENSION INTRAMUSCULAR ONCE
Refills: 0 | Status: DISCONTINUED | OUTPATIENT
Start: 2019-12-13 | End: 2019-12-14

## 2019-12-13 RX ORDER — LEVOTHYROXINE SODIUM 125 MCG
50 TABLET ORAL
Refills: 0 | Status: DISCONTINUED | OUTPATIENT
Start: 2019-12-13 | End: 2019-12-14

## 2019-12-13 RX ORDER — LEVOTHYROXINE SODIUM 125 MCG
25 TABLET ORAL
Refills: 0 | Status: DISCONTINUED | OUTPATIENT
Start: 2019-12-13 | End: 2019-12-14

## 2019-12-13 RX ORDER — SIMETHICONE 80 MG/1
80 TABLET, CHEWABLE ORAL
Refills: 0 | Status: DISCONTINUED | OUTPATIENT
Start: 2019-12-13 | End: 2019-12-14

## 2019-12-13 RX ADMIN — SIMETHICONE 80 MILLIGRAM(S): 80 TABLET, CHEWABLE ORAL at 15:52

## 2019-12-13 RX ADMIN — Medication 400 MILLIGRAM(S): at 06:26

## 2019-12-13 RX ADMIN — SIMETHICONE 80 MILLIGRAM(S): 80 TABLET, CHEWABLE ORAL at 21:17

## 2019-12-13 RX ADMIN — Medication 30 MILLIGRAM(S): at 12:28

## 2019-12-13 RX ADMIN — Medication 600 MILLIGRAM(S): at 23:31

## 2019-12-13 RX ADMIN — Medication 975 MILLIGRAM(S): at 23:31

## 2019-12-13 RX ADMIN — Medication 975 MILLIGRAM(S): at 12:28

## 2019-12-13 RX ADMIN — SIMETHICONE 80 MILLIGRAM(S): 80 TABLET, CHEWABLE ORAL at 01:35

## 2019-12-13 RX ADMIN — Medication 30 MILLIGRAM(S): at 00:27

## 2019-12-13 RX ADMIN — Medication 975 MILLIGRAM(S): at 17:42

## 2019-12-13 RX ADMIN — ENOXAPARIN SODIUM 40 MILLIGRAM(S): 100 INJECTION SUBCUTANEOUS at 07:22

## 2019-12-13 RX ADMIN — Medication 30 MILLIGRAM(S): at 06:27

## 2019-12-13 RX ADMIN — Medication 25 MICROGRAM(S): at 08:27

## 2019-12-13 RX ADMIN — Medication 1000 MILLIGRAM(S): at 00:27

## 2019-12-13 RX ADMIN — SIMETHICONE 80 MILLIGRAM(S): 80 TABLET, CHEWABLE ORAL at 07:21

## 2019-12-13 RX ADMIN — Medication 30 MILLIGRAM(S): at 13:29

## 2019-12-13 RX ADMIN — Medication 600 MILLIGRAM(S): at 17:43

## 2019-12-13 RX ADMIN — Medication 975 MILLIGRAM(S): at 13:29

## 2019-12-13 RX ADMIN — SODIUM CHLORIDE 40 MILLILITER(S): 9 INJECTION, SOLUTION INTRAVENOUS at 06:29

## 2019-12-13 NOTE — DISCHARGE NOTE PROVIDER - NSDCCPCAREPLAN_GEN_ALL_CORE_FT
PRINCIPAL DISCHARGE DIAGNOSIS  Diagnosis: Postoperative state  Assessment and Plan of Treatment:       SECONDARY DISCHARGE DIAGNOSES  Diagnosis: Retained prod concept-del w/ postpartum complication  Assessment and Plan of Treatment: Retained prod concept-del w/ postpartum complication    Diagnosis: Vaginal bleeding  Assessment and Plan of Treatment:

## 2019-12-13 NOTE — PROGRESS NOTE ADULT - PROBLEM SELECTOR PLAN 1
Neuro: c/w Toradol, IV Tylenol, transition to Motrin/po Tylenol this afternoon, c/w oxy prn  CV: Hemodynamically stable, CBC pending this AM, may possible need additional unit of pRBC pending results as H&H may not have equilibrated  Pulm: Saturating well on room air, encourage incentive spirometry  GI: C/w regular diet  : UOP adequate, d/c osorio  Heme: Lovenox and SCDs for DVT ppx  Dispo: Continue routine post-op care    Abiel Ceron, PGY-4  Pager #[phone] ([facility]), [phone] ([place])

## 2019-12-13 NOTE — DISCHARGE NOTE PROVIDER - CARE PROVIDER_API CALL
Robby Kearns)  [facility] Other  [address], [address] [place] [address]  Phone: [phone]  Fax: [phone]  Follow Up Time:

## 2019-12-13 NOTE — PROGRESS NOTE ADULT - ATTENDING COMMENTS
Pt seen and examined. Pain very well controlled.  Tolerating PO.  Ambulate from bed to bathroom, yet feeling weak.  No CP/SOB.  Will tranfuse an additional unit PRBC and continue postoperative care.

## 2019-12-13 NOTE — DISCHARGE NOTE PROVIDER - HOSPITAL COURSE
28y/o  6 weeks POD pLTCS for monodi TIUP s/p D&C for presumed retained products converted to CAROLINE 2/2 continued and severe uterine bleeding refractory to D&C and all medical management in the OR, total EBL 2500 mL, s/p 4U pRBC, 2U FFP, 1U plt in the OR and one additional unit of FFP in PACU. Hematocrit was trended following procedure immediately, at 4 hours post op, and on POD#1 AM. Hct 20.7 at that time. Patient symptomatically weak, however voiding spontaneously with stable vitals. Patient received 1 more unit pRBC at that time. Continued to meet post op milestones - ambulating, tolerating PO, voiding spontaneously, with stable vitals. Labs wnl. On day of discharge, patiently symptomatically and clinically improved. 30y/o  6 weeks POD pLTCS for monodi TIUP s/p D&C for presumed retained products converted to CAROLINE 2/2 continued and severe uterine bleeding refractory to D&C and all medical management in the OR, total EBL 2500 mL, s/p 4U pRBC, 2U FFP, 1U plt in the OR and one additional unit of FFP in PACU. Hematocrit was trended following procedure immediately, at 4 hours post op, and on POD#1 AM. Hct 20.7 at that time. Patient symptomatically weak, however voiding spontaneously with stable vitals. Patient received 1 more unit pRBC at that time. Continued to meet post op milestones - discharge H/H at 26.3 ambulating, tolerating PO, voiding spontaneously, with stable vitals. Labs wnl. On day of discharge, patiently symptomatically and clinically improved.

## 2019-12-13 NOTE — DISCHARGE NOTE PROVIDER - NSDCFUADDINST_GEN_ALL_CORE_FT
Return to your regular way of eating.  Resume normal activity as tolerated, but no heavy lifting or strenuous activity for 6 weeks.  No driving for next 2 weeks and/or while on narcotic pain medication.  Complete vaginal rest, no tampons, no douching, no tub bathing, no sexual activities for 6 weeks unless otherwise instructed by your doctor.  Call your doctor with any signs and symptoms of infection such as fever, chills, nausea or vomiting.  Call your doctor with redness or swelling at the incision site, fluid leakage or wound separation.  Call your doctor if you're unable to tolerate food or have difficulty urinating.  Call your doctor if you have pain that is not relieved by your prescribed medications.  Notify your doctor with any other concerns.  Follow up with Dr. Alex in 2 weeks.

## 2019-12-13 NOTE — DISCHARGE NOTE PROVIDER - NSDCMRMEDTOKEN_GEN_ALL_CORE_FT
acetaminophen 325 mg oral tablet: 3 tab(s) orally   ibuprofen 600 mg oral tablet: 1 tab(s) orally every 6 hours acetaminophen 325 mg oral tablet: 3 tab(s) orally   ibuprofen 600 mg oral tablet: 1 tab(s) orally every 6 hours  levothyroxine 25 mcg (0.025 mg) oral tablet: 1 tab(s) orally   levothyroxine 50 mcg (0.05 mg) oral tablet: 1 tab(s) orally

## 2019-12-13 NOTE — PROGRESS NOTE ADULT - ASSESSMENT
30y/o POD#1 from D&C for presumed retained products converted to CAROLINE 2/2 continued and severe uterine bleeding refractory to D&C and all medical management in the OR, total EBL 2500 mL, s/p 4U pRBC, 2U FFP, 1U plt int he OR and one additional unit of FFP in PACU, now in stable condition.

## 2019-12-13 NOTE — PROGRESS NOTE ADULT - SUBJECTIVE AND OBJECTIVE BOX
R4 Gyn Progress Note    Patient seen and examined at bedside, no acute overnight events. No acute complaints, pain well controlled. Patient is tolerating soup, some nausea overnight now resolved, no vomiting. Not yet ambulating. Has not yet passed flatus. Hui is still in place. Denies CP, SOB, N/V, fevers, and chills.    Vital Signs Last 24 Hours  T(C): 37.2 (12-13-19 @ 05:39), Max: 37.6 (12-12-19 @ 21:19)  HR: 94 (12-13-19 @ 05:39) (61 - 111)  BP: 109/68 (12-13-19 @ 05:39) (109/68 - 135/86)  RR: 18 (12-13-19 @ 05:39) (10 - 22)  SpO2: 97% (12-13-19 @ 05:39) (97% - 100%)    I&O's Summary    12 Dec 2019 07:01  -  13 Dec 2019 06:52  --------------------------------------------------------  IN: 2347 mL / OUT: 2465 mL / NET: -118 mL    Physical Exam:  General: NAD  CV: NR, RR, S1, S2, no M/R/G  Lungs: CTA-B  Abdomen: Soft, non-tender, non-distended, +BS  Incision: Pfannenstiel CDI  Ext: No pain or swelling    Labs:             8.5<L>  11.37<H> )-----------( 173      ( 12-12 @ 19:50 )             24.9<L>               9.2<L>  11.57<H> )-----------( 154      ( 12-12 @ 16:10 )             26.9<L>               11.5   17.51<H> )-----------( 201      ( 12-12 @ 14:08 )             35.5                13.1   8.31  )-----------( 289      ( 12-12 @ 04:00 )             40.5         MEDICATIONS  (STANDING):  acetaminophen   Tablet .. 975 milliGRAM(s) Oral every 6 hours  enoxaparin Injectable 40 milliGRAM(s) SubCutaneous every 24 hours  ibuprofen  Tablet. 600 milliGRAM(s) Oral every 6 hours  influenza   Vaccine 0.5 milliLiter(s) IntraMuscular once  ketorolac   Injectable 30 milliGRAM(s) IV Push every 6 hours  lactated ringers. 1000 milliLiter(s) (40 mL/Hr) IV Continuous <Continuous>  simethicone 80 milliGRAM(s) Chew four times a day    MEDICATIONS  (PRN):  oxyCODONE    IR 5 milliGRAM(s) Oral every 4 hours PRN Severe Pain (7 - 10)  oxyCODONE    IR 2.5 milliGRAM(s) Oral every 4 hours PRN Moderate Pain (4 - 6)

## 2019-12-13 NOTE — PROGRESS NOTE ADULT - SUBJECTIVE AND OBJECTIVE BOX
ANESTHESIA POSTOPERATIVE NOTE    Patient is a 29y old  Female who presents with a chief complaint of retained products of conception (13 Dec 2019 06:52)  s/p D & C converted to Hysterectomy due to extensive bleeding.       Vital Signs Last 24 Hrs  T(C): 37.5 (13 Dec 2019 09:55), Max: 37.6 (12 Dec 2019 21:19)  T(F): 99.5 (13 Dec 2019 09:55), Max: 99.7 (12 Dec 2019 21:19)  HR: 99 (13 Dec 2019 09:55) (94 - 111)  BP: 117/70 (13 Dec 2019 09:55) (109/68 - 135/86)  BP(mean): 97 (12 Dec 2019 20:00) (83 - 99)  RR: 18 (13 Dec 2019 09:55) (10 - 22)  SpO2: 98% (13 Dec 2019 09:55) (97% - 100%)      [x ] No apparent anesthesia related complications     Comments: pain well controlled over night. Pt stable. VSS. lab work reveals persistent underlying coagulopathy. Continue management as indicated

## 2019-12-14 ENCOUNTER — TRANSCRIPTION ENCOUNTER (OUTPATIENT)
Age: 29
End: 2019-12-14

## 2019-12-14 VITALS
OXYGEN SATURATION: 98 % | HEART RATE: 97 BPM | RESPIRATION RATE: 18 BRPM | SYSTOLIC BLOOD PRESSURE: 112 MMHG | TEMPERATURE: 98 F | DIASTOLIC BLOOD PRESSURE: 69 MMHG

## 2019-12-14 DIAGNOSIS — Z98.890 OTHER SPECIFIED POSTPROCEDURAL STATES: ICD-10-CM

## 2019-12-14 LAB
ALBUMIN SERPL ELPH-MCNC: 3.3 G/DL — SIGNIFICANT CHANGE UP (ref 3.3–5)
ALP SERPL-CCNC: 50 U/L — SIGNIFICANT CHANGE UP (ref 40–120)
ALT FLD-CCNC: 60 U/L — HIGH (ref 4–33)
ANION GAP SERPL CALC-SCNC: 12 MMO/L — SIGNIFICANT CHANGE UP (ref 7–14)
AST SERPL-CCNC: 38 U/L — HIGH (ref 4–32)
BASOPHILS # BLD AUTO: 0.05 K/UL — SIGNIFICANT CHANGE UP (ref 0–0.2)
BASOPHILS NFR BLD AUTO: 0.8 % — SIGNIFICANT CHANGE UP (ref 0–2)
BILIRUB SERPL-MCNC: 0.8 MG/DL — SIGNIFICANT CHANGE UP (ref 0.2–1.2)
BUN SERPL-MCNC: 9 MG/DL — SIGNIFICANT CHANGE UP (ref 7–23)
CALCIUM SERPL-MCNC: 8.6 MG/DL — SIGNIFICANT CHANGE UP (ref 8.4–10.5)
CHLORIDE SERPL-SCNC: 107 MMOL/L — SIGNIFICANT CHANGE UP (ref 98–107)
CO2 SERPL-SCNC: 19 MMOL/L — LOW (ref 22–31)
CREAT SERPL-MCNC: 0.53 MG/DL — SIGNIFICANT CHANGE UP (ref 0.5–1.3)
EOSINOPHIL # BLD AUTO: 0.37 K/UL — SIGNIFICANT CHANGE UP (ref 0–0.5)
EOSINOPHIL NFR BLD AUTO: 5.6 % — SIGNIFICANT CHANGE UP (ref 0–6)
GLUCOSE SERPL-MCNC: 80 MG/DL — SIGNIFICANT CHANGE UP (ref 70–99)
HCT VFR BLD CALC: 26.3 % — LOW (ref 34.5–45)
HGB BLD-MCNC: 8.7 G/DL — LOW (ref 11.5–15.5)
IMM GRANULOCYTES NFR BLD AUTO: 0.5 % — SIGNIFICANT CHANGE UP (ref 0–1.5)
LYMPHOCYTES # BLD AUTO: 2.1 K/UL — SIGNIFICANT CHANGE UP (ref 1–3.3)
LYMPHOCYTES # BLD AUTO: 31.9 % — SIGNIFICANT CHANGE UP (ref 13–44)
MCHC RBC-ENTMCNC: 28.7 PG — SIGNIFICANT CHANGE UP (ref 27–34)
MCHC RBC-ENTMCNC: 33.1 % — SIGNIFICANT CHANGE UP (ref 32–36)
MCV RBC AUTO: 86.8 FL — SIGNIFICANT CHANGE UP (ref 80–100)
MONOCYTES # BLD AUTO: 0.44 K/UL — SIGNIFICANT CHANGE UP (ref 0–0.9)
MONOCYTES NFR BLD AUTO: 6.7 % — SIGNIFICANT CHANGE UP (ref 2–14)
NEUTROPHILS # BLD AUTO: 3.6 K/UL — SIGNIFICANT CHANGE UP (ref 1.8–7.4)
NEUTROPHILS NFR BLD AUTO: 54.5 % — SIGNIFICANT CHANGE UP (ref 43–77)
NRBC # FLD: 0 K/UL — SIGNIFICANT CHANGE UP (ref 0–0)
PLATELET # BLD AUTO: 165 K/UL — SIGNIFICANT CHANGE UP (ref 150–400)
PMV BLD: 9.2 FL — SIGNIFICANT CHANGE UP (ref 7–13)
POTASSIUM SERPL-MCNC: 3.9 MMOL/L — SIGNIFICANT CHANGE UP (ref 3.5–5.3)
POTASSIUM SERPL-SCNC: 3.9 MMOL/L — SIGNIFICANT CHANGE UP (ref 3.5–5.3)
PROT SERPL-MCNC: 5.8 G/DL — LOW (ref 6–8.3)
RBC # BLD: 3.03 M/UL — LOW (ref 3.8–5.2)
RBC # FLD: 14.6 % — HIGH (ref 10.3–14.5)
SODIUM SERPL-SCNC: 138 MMOL/L — SIGNIFICANT CHANGE UP (ref 135–145)
WBC # BLD: 6.59 K/UL — SIGNIFICANT CHANGE UP (ref 3.8–10.5)
WBC # FLD AUTO: 6.59 K/UL — SIGNIFICANT CHANGE UP (ref 3.8–10.5)

## 2019-12-14 RX ORDER — IBUPROFEN 200 MG
1 TABLET ORAL
Qty: 28 | Refills: 0
Start: 2019-12-14 | End: 2019-12-20

## 2019-12-14 RX ORDER — LEVOTHYROXINE SODIUM 125 MCG
1 TABLET ORAL
Qty: 0 | Refills: 0 | DISCHARGE
Start: 2019-12-14

## 2019-12-14 RX ADMIN — SIMETHICONE 80 MILLIGRAM(S): 80 TABLET, CHEWABLE ORAL at 05:40

## 2019-12-14 RX ADMIN — SIMETHICONE 80 MILLIGRAM(S): 80 TABLET, CHEWABLE ORAL at 11:08

## 2019-12-14 RX ADMIN — Medication 975 MILLIGRAM(S): at 11:37

## 2019-12-14 RX ADMIN — Medication 975 MILLIGRAM(S): at 05:39

## 2019-12-14 RX ADMIN — Medication 600 MILLIGRAM(S): at 11:37

## 2019-12-14 RX ADMIN — Medication 975 MILLIGRAM(S): at 11:07

## 2019-12-14 RX ADMIN — Medication 600 MILLIGRAM(S): at 05:39

## 2019-12-14 RX ADMIN — Medication 600 MILLIGRAM(S): at 11:07

## 2019-12-14 RX ADMIN — ENOXAPARIN SODIUM 40 MILLIGRAM(S): 100 INJECTION SUBCUTANEOUS at 08:17

## 2019-12-14 RX ADMIN — Medication 50 MICROGRAM(S): at 05:39

## 2019-12-14 NOTE — PROGRESS NOTE ADULT - ASSESSMENT
A/P: 28yo POD#2 s/p D&C converted to Total Abdominal Hysterectomy, b/l salpingectomy for presumed retained POC c/b hemorrhage of 2.5L. Patient is s/p 4uPRBC intraop and 1u PRBC yesterday, with appropriately rising H/H and clinically well.

## 2019-12-14 NOTE — PROGRESS NOTE ADULT - PROBLEM SELECTOR PLAN 1
Neuro: PO pain meds.   CV: Hemodynamically stable. H/H appropriate at 26.3 s/p 1 uPRBC yesterday  Pulm: Saturating well on room air, encourage oob/amb  GI: Continue regular diet  : Voiding spontaneously   Heme: c/w HSQ and SCDs for DVT ppx  FEN: SLIV  ID: Afebrile  Endo: No active issues   Dispo: Discharge home w/ close follow up with Dr. Isai Suarez PGY2

## 2019-12-14 NOTE — DISCHARGE NOTE NURSING/CASE MANAGEMENT/SOCIAL WORK - NSDCPNDISPN_GEN_ALL_CORE
Safe use, storage and disposal of opioids when prescribed/Side effects of pain management treatment/Activities of daily living, including home environment that might     exacerbate pain or reduce effectiveness of the pain management plan of care as well as strategies to address these issues

## 2019-12-14 NOTE — PROGRESS NOTE ADULT - REASON FOR ADMISSION
retained products of conception

## 2019-12-14 NOTE — PROGRESS NOTE ADULT - SUBJECTIVE AND OBJECTIVE BOX
R2 GYN Progress Note    POD#   HD#    Patient seen and examined at bedside after lunch. Patient tolerate lunch well and is currently breastfeeding. No acute complaints.  Pain well controlled.  Patient is ambulating and tolerating PO. Patient is passing flatus.   Patient is voiding spontaneously.  Denies CP, SOB, N/V, fevers, and chills.    Vital Signs Last 24 Hours  T(C): 36.9 (12-14-19 @ 11:06), Max: 37.4 (12-13-19 @ 15:46)  HR: 85 (12-14-19 @ 11:06) (70 - 99)  BP: 103/57 (12-14-19 @ 11:06) (103/57 - 130/74)  RR: 18 (12-14-19 @ 11:06) (16 - 18)  SpO2: 98% (12-14-19 @ 11:06) (97% - 99%)    I&O's Summary    13 Dec 2019 07:01  -  14 Dec 2019 07:00  --------------------------------------------------------  IN: 0 mL / OUT: 2250 mL / NET: -2250 mL    14 Dec 2019 07:01  -  14 Dec 2019 13:32  --------------------------------------------------------  IN: 0 mL / OUT: 700 mL / NET: -700 mL        Physical Exam:  General: NAD  CV: RR, S1, S2, no M/R/G  Lungs: CTA b/l, good air flow b/l   Abdomen: Soft, appropriately-tender, non distended, tympanic, normoactive bowel sounds  Incision: CDI  : no bleeding on pad  Ext: No pain or swelling     Labs:                        8.7    6.59  )-----------( 165      ( 14 Dec 2019 05:31 )             26.3   baso 0.8    eos 5.6    imm gran 0.5    lymph 31.9   mono 6.7    poly 54.5                         7.0    6.11  )-----------( 148      ( 13 Dec 2019 12:00 )             20.7   baso 0.5    eos 0.5    imm gran 0.5    lymph 25.7   mono 7.7    poly 65.1                         7.1    7.07  )-----------( 170      ( 13 Dec 2019 05:48 )             21.0   baso 0.3    eos 0.1    imm gran 0.3    lymph 27.0   mono 7.2    poly 65.1                         8.5    11.37 )-----------( 173      ( 12 Dec 2019 19:50 )             24.9   baso 0.1    eos 0.1    imm gran 0.4    lymph 5.7    mono 3.8    poly 89.9                         9.2    11.57 )-----------( 154      ( 12 Dec 2019 16:10 )             26.9   baso 0.2    eos 0.1    imm gran 0.5    lymph 6.1    mono 3.1    poly 90.0                         11.5   17.51 )-----------( 201      ( 12 Dec 2019 14:08 )             35.5   baso 0.3    eos 0.8    imm gran 0.4    lymph 7.0    mono 2.4    poly 89.1                         13.1   8.31  )-----------( 289      ( 12 Dec 2019 04:00 )             40.5   baso 0.5    eos 6.7    imm gran 0.1    lymph 32.5   mono 6.1    poly 54.1       MEDICATIONS  (STANDING):  acetaminophen   Tablet .. 975 milliGRAM(s) Oral every 6 hours  enoxaparin Injectable 40 milliGRAM(s) SubCutaneous every 24 hours  ibuprofen  Tablet. 600 milliGRAM(s) Oral every 6 hours  influenza   Vaccine 0.5 milliLiter(s) IntraMuscular once  levothyroxine 25 MICROGram(s) Oral <User Schedule>  levothyroxine 50 MICROGram(s) Oral <User Schedule>  simethicone 80 milliGRAM(s) Chew four times a day    MEDICATIONS  (PRN):  oxyCODONE    IR 5 milliGRAM(s) Oral every 4 hours PRN Severe Pain (7 - 10)  oxyCODONE    IR 2.5 milliGRAM(s) Oral every 4 hours PRN Moderate Pain (4 - 6)

## 2019-12-14 NOTE — PROGRESS NOTE ADULT - SUBJECTIVE AND OBJECTIVE BOX
OB Progress Note, POD#2    S: 28yo POD#2 s/p D&C converted to Total Abdominal Hysterectomy, b/l salpingectomy for presumed retained POC c/b hemorrhage of 2.5L. Her pain is well controlled. She is tolerating a regular diet and passing flatus. Voiding spontaneously. Denies N/V/D. Denies CP/SOB/lightheadedness/dizziness. She is breastf.    O:  Vitals:  Vital Signs Last 24 Hrs  T(C): 36.8 (14 Dec 2019 05:38), Max: 37.5 (13 Dec 2019 09:55)  T(F): 98.2 (14 Dec 2019 05:38), Max: 99.5 (13 Dec 2019 09:55)  HR: 70 (14 Dec 2019 05:38) (70 - 99)  BP: 130/74 (14 Dec 2019 05:38) (106/60 - 130/74)  RR: 18 (14 Dec 2019 05:38) (16 - 18)  SpO2: 99% (14 Dec 2019 05:38) (97% - 99%)    MEDICATIONS  (STANDING):  acetaminophen   Tablet .. 975 milliGRAM(s) Oral every 6 hours  enoxaparin Injectable 40 milliGRAM(s) SubCutaneous every 24 hours  ibuprofen  Tablet. 600 milliGRAM(s) Oral every 6 hours  influenza   Vaccine 0.5 milliLiter(s) IntraMuscular once  levothyroxine 25 MICROGram(s) Oral <User Schedule>  levothyroxine 50 MICROGram(s) Oral <User Schedule>  simethicone 80 milliGRAM(s) Chew four times a day      MEDICATIONS  (PRN):  oxyCODONE    IR 5 milliGRAM(s) Oral every 4 hours PRN Severe Pain (7 - 10)  oxyCODONE    IR 2.5 milliGRAM(s) Oral every 4 hours PRN Moderate Pain (4 - 6)      Labs:  Blood type: A Positive  Rubella IgG: RPR: Negative                          7.0<LL>   6.11 >-----------< 148<L>    ( 12-13 @ 12:00 )             20.7<LL>                        7.1<L>   7.07 >-----------< 170    ( 12-13 @ 05:48 )             21.0<LL>                        8.5<L>   11.37<H> >-----------< 173    ( 12-12 @ 19:50 )             24.9<L>                        9.2<L>   11.57<H> >-----------< 154    ( 12-12 @ 16:10 )             26.9<L>                        11.5   17.51<H> >-----------< 201    ( 12-12 @ 14:08 )             35.5                        13.1   8.31 >-----------< 289    ( 12-12 @ 04:00 )             40.5    12-13-19 @ 05:48      140  |  106  |  9   ----------------------------<  93  4.0   |  23  |  0.66    12-12-19 @ 19:50      139  |  103  |  10  ----------------------------<  131<H>  4.0   |  23  |  0.57    12-12-19 @ 16:10      141  |  107  |  11  ----------------------------<  150<H>  3.9   |  24  |  0.53    12-12-19 @ 09:25      142  |  107  |  14  ----------------------------<  85  3.9   |  23  |  0.54    12-12-19 @ 04:00      137  |  100  |  19  ----------------------------<  97  3.6   |  21<L>  |  0.59        Ca    8.2<L>      13 Dec 2019 05:48  Ca    8.6      12 Dec 2019 19:50  Ca    8.5      12 Dec 2019 16:10  Ca    8.9      12 Dec 2019 09:25  Ca    9.8      12 Dec 2019 04:00    TPro  5.0<L>  /  Alb  3.2<L>  /  TBili  1.0  /  DBili  x   /  AST  33<H>  /  ALT  54<H>  /  AlkPhos  47  12-13-19 @ 05:48  TPro  6.1  /  Alb  4.2  /  TBili  1.2  /  DBili  x   /  AST  49<H>  /  ALT  69<H>  /  AlkPhos  53  12-12-19 @ 19:50  TPro  5.4<L>  /  Alb  3.7  /  TBili  1.0  /  DBili  x   /  AST  44<H>  /  ALT  66<H>  /  AlkPhos  48  12-12-19 @ 16:10  TPro  6.8  /  Alb  4.0  /  TBili  0.4  /  DBili  x   /  AST  65<H>  /  ALT  133<H>  /  AlkPhos  88  12-12-19 @ 09:25  TPro  7.7  /  Alb  4.3  /  TBili  0.3  /  DBili  x   /  AST  72<H>  /  ALT  152<H>  /  AlkPhos  93  12-12-19 @ 04:00          PE:  General: NAD  Abdomen: Soft, appropriately tender, incision c/d/i with dermabond  Extremities: No erythema, no pitting edema    A/P: 28yo POD#2 s/p D&C converted to Total Abdominal Hysterectomy, b/l salpingectomy for presumed retained POC c/b hemorrhage of 2.5L  - Continue regular diet.  - Increase ambulation.  - Continue motrin, tylenol, oxycodone PRN for pain control.  - f/u AM CBC; s/p additional unit PRBC yesterday for symptomatic anemia  - d/c planning based on clinical status and bloodwork today    Robby Alex MD

## 2019-12-23 ENCOUNTER — APPOINTMENT (OUTPATIENT)
Dept: OBGYN | Facility: CLINIC | Age: 29
End: 2019-12-23
Payer: MEDICAID

## 2019-12-23 VITALS
HEIGHT: 60 IN | BODY MASS INDEX: 25.91 KG/M2 | SYSTOLIC BLOOD PRESSURE: 110 MMHG | WEIGHT: 132 LBS | DIASTOLIC BLOOD PRESSURE: 70 MMHG

## 2019-12-23 DIAGNOSIS — R31.9 HEMATURIA, UNSPECIFIED: ICD-10-CM

## 2019-12-23 LAB
BILIRUB UR QL STRIP: NORMAL
GLUCOSE UR-MCNC: NORMAL
HCG UR QL: 0.2 EU/DL
HGB UR QL STRIP.AUTO: NORMAL
KETONES UR-MCNC: NORMAL
LEUKOCYTE ESTERASE UR QL STRIP: NORMAL
NITRITE UR QL STRIP: NORMAL
PH UR STRIP: 5.5
PROT UR STRIP-MCNC: NORMAL
SP GR UR STRIP: 1.02

## 2019-12-23 PROCEDURE — 99024 POSTOP FOLLOW-UP VISIT: CPT

## 2019-12-23 PROCEDURE — 81003 URINALYSIS AUTO W/O SCOPE: CPT | Mod: QW

## 2019-12-26 LAB
BACTERIA UR CULT: NORMAL
CANDIDA VAG CYTO: NOT DETECTED
G VAGINALIS+PREV SP MTYP VAG QL MICRO: NOT DETECTED
T VAGINALIS VAG QL WET PREP: NOT DETECTED

## 2020-01-06 ENCOUNTER — EMERGENCY (EMERGENCY)
Facility: HOSPITAL | Age: 30
LOS: 1 days | Discharge: HOME | End: 2020-01-06
Attending: EMERGENCY MEDICINE | Admitting: EMERGENCY MEDICINE
Payer: MEDICAID

## 2020-01-06 VITALS
WEIGHT: 130.95 LBS | RESPIRATION RATE: 16 BRPM | DIASTOLIC BLOOD PRESSURE: 85 MMHG | OXYGEN SATURATION: 100 % | SYSTOLIC BLOOD PRESSURE: 141 MMHG | TEMPERATURE: 98 F | HEART RATE: 72 BPM

## 2020-01-06 VITALS
RESPIRATION RATE: 18 BRPM | HEART RATE: 67 BPM | SYSTOLIC BLOOD PRESSURE: 140 MMHG | OXYGEN SATURATION: 99 % | DIASTOLIC BLOOD PRESSURE: 70 MMHG | TEMPERATURE: 98 F

## 2020-01-06 DIAGNOSIS — Z90.710 ACQUIRED ABSENCE OF BOTH CERVIX AND UTERUS: Chronic | ICD-10-CM

## 2020-01-06 DIAGNOSIS — Z98.891 HISTORY OF UTERINE SCAR FROM PREVIOUS SURGERY: Chronic | ICD-10-CM

## 2020-01-06 LAB
ALBUMIN SERPL ELPH-MCNC: 4.3 G/DL — SIGNIFICANT CHANGE UP (ref 3.3–5)
ALP SERPL-CCNC: 96 U/L — SIGNIFICANT CHANGE UP (ref 30–120)
ALT FLD-CCNC: 107 U/L DA — HIGH (ref 10–60)
ANION GAP SERPL CALC-SCNC: 10 MMOL/L — SIGNIFICANT CHANGE UP (ref 5–17)
APPEARANCE UR: CLEAR — SIGNIFICANT CHANGE UP
APTT BLD: 35.5 SEC — SIGNIFICANT CHANGE UP (ref 28.5–37)
AST SERPL-CCNC: 43 U/L — HIGH (ref 10–40)
BILIRUB SERPL-MCNC: 0.5 MG/DL — SIGNIFICANT CHANGE UP (ref 0.2–1.2)
BILIRUB UR-MCNC: NEGATIVE — SIGNIFICANT CHANGE UP
BUN SERPL-MCNC: 19 MG/DL — SIGNIFICANT CHANGE UP (ref 7–23)
CALCIUM SERPL-MCNC: 9.2 MG/DL — SIGNIFICANT CHANGE UP (ref 8.4–10.5)
CHLORIDE SERPL-SCNC: 105 MMOL/L — SIGNIFICANT CHANGE UP (ref 96–108)
CO2 SERPL-SCNC: 23 MMOL/L — SIGNIFICANT CHANGE UP (ref 22–31)
COLOR SPEC: YELLOW — SIGNIFICANT CHANGE UP
CREAT SERPL-MCNC: 0.62 MG/DL — SIGNIFICANT CHANGE UP (ref 0.5–1.3)
DIFF PNL FLD: NEGATIVE — SIGNIFICANT CHANGE UP
GLUCOSE SERPL-MCNC: 85 MG/DL — SIGNIFICANT CHANGE UP (ref 70–99)
GLUCOSE UR QL: NEGATIVE MG/DL — SIGNIFICANT CHANGE UP
HCT VFR BLD CALC: 35.6 % — SIGNIFICANT CHANGE UP (ref 34.5–45)
HGB BLD-MCNC: 11.5 G/DL — SIGNIFICANT CHANGE UP (ref 11.5–15.5)
INR BLD: 1.16 RATIO — SIGNIFICANT CHANGE UP (ref 0.88–1.16)
KETONES UR-MCNC: NEGATIVE — SIGNIFICANT CHANGE UP
LEUKOCYTE ESTERASE UR-ACNC: NEGATIVE — SIGNIFICANT CHANGE UP
MCHC RBC-ENTMCNC: 28.5 PG — SIGNIFICANT CHANGE UP (ref 27–34)
MCHC RBC-ENTMCNC: 32.3 GM/DL — SIGNIFICANT CHANGE UP (ref 32–36)
MCV RBC AUTO: 88.1 FL — SIGNIFICANT CHANGE UP (ref 80–100)
NITRITE UR-MCNC: NEGATIVE — SIGNIFICANT CHANGE UP
NRBC # BLD: 0 /100 WBCS — SIGNIFICANT CHANGE UP (ref 0–0)
PH UR: 5 — SIGNIFICANT CHANGE UP (ref 5–8)
PLATELET # BLD AUTO: 316 K/UL — SIGNIFICANT CHANGE UP (ref 150–400)
POTASSIUM SERPL-MCNC: 3.9 MMOL/L — SIGNIFICANT CHANGE UP (ref 3.5–5.3)
POTASSIUM SERPL-SCNC: 3.9 MMOL/L — SIGNIFICANT CHANGE UP (ref 3.5–5.3)
PROT SERPL-MCNC: 8.1 G/DL — SIGNIFICANT CHANGE UP (ref 6–8.3)
PROT UR-MCNC: NEGATIVE MG/DL — SIGNIFICANT CHANGE UP
PROTHROM AB SERPL-ACNC: 12.7 SEC — SIGNIFICANT CHANGE UP (ref 10–12.9)
RBC # BLD: 4.04 M/UL — SIGNIFICANT CHANGE UP (ref 3.8–5.2)
RBC # FLD: 13.8 % — SIGNIFICANT CHANGE UP (ref 10.3–14.5)
SODIUM SERPL-SCNC: 138 MMOL/L — SIGNIFICANT CHANGE UP (ref 135–145)
SP GR SPEC: 1.02 — SIGNIFICANT CHANGE UP (ref 1.01–1.02)
UROBILINOGEN FLD QL: NEGATIVE MG/DL — SIGNIFICANT CHANGE UP
WBC # BLD: 7.11 K/UL — SIGNIFICANT CHANGE UP (ref 3.8–10.5)
WBC # FLD AUTO: 7.11 K/UL — SIGNIFICANT CHANGE UP (ref 3.8–10.5)

## 2020-01-06 PROCEDURE — 81003 URINALYSIS AUTO W/O SCOPE: CPT

## 2020-01-06 PROCEDURE — 99284 EMERGENCY DEPT VISIT MOD MDM: CPT | Mod: 25

## 2020-01-06 PROCEDURE — 99284 EMERGENCY DEPT VISIT MOD MDM: CPT

## 2020-01-06 PROCEDURE — 71275 CT ANGIOGRAPHY CHEST: CPT | Mod: 26

## 2020-01-06 PROCEDURE — 85027 COMPLETE CBC AUTOMATED: CPT

## 2020-01-06 PROCEDURE — 36415 COLL VENOUS BLD VENIPUNCTURE: CPT

## 2020-01-06 PROCEDURE — 71275 CT ANGIOGRAPHY CHEST: CPT

## 2020-01-06 PROCEDURE — 85610 PROTHROMBIN TIME: CPT

## 2020-01-06 PROCEDURE — 80053 COMPREHEN METABOLIC PANEL: CPT

## 2020-01-06 PROCEDURE — 85730 THROMBOPLASTIN TIME PARTIAL: CPT

## 2020-01-06 PROCEDURE — 93005 ELECTROCARDIOGRAM TRACING: CPT

## 2020-01-06 PROCEDURE — 93010 ELECTROCARDIOGRAM REPORT: CPT

## 2020-01-06 RX ORDER — ALBUTEROL 90 UG/1
2 AEROSOL, METERED ORAL ONCE
Refills: 0 | Status: DISCONTINUED | OUTPATIENT
Start: 2020-01-06 | End: 2020-01-11

## 2020-01-06 NOTE — ED PROVIDER NOTE - PROGRESS NOTE DETAILS
still feels a mild tightness in chest, discussed ct chest results, will try 2 puffs inhaler to see if feels improvement, advise f/u with her ob and pulm; d/w pt that albuterol can be used while breastfeeding if feels it helps. advised that IV contrast from CTA not known to be toxic to infants from breastfeeding, but can pump and dump for 24hr if feels safer; pt will try albuterol, wants to go home, will schedule f/u. No HA at this time. Advise pt can take tylenol as needed for pain - declined here

## 2020-01-06 NOTE — ED PROVIDER NOTE - PATIENT PORTAL LINK FT
You can access the FollowMyHealth Patient Portal offered by [facility], [place] by registering at the following website: [email or web address]. By joining [employer]’s FollowMyHealth portal, you will also be able to view your health information using other applications (apps) compatible with our system.

## 2020-01-06 NOTE — ED PROVIDER NOTE - CARE PROVIDER_API CALL
Krishna Clarke)  Critical Care Medicine; Internal Medicine; Pulmonary Disease  [address] [place] [address]  Phone: [phone]  Fax: [phone]  Follow Up Time: 1-3 Days

## 2020-01-06 NOTE — ED ADULT TRIAGE NOTE - CHIEF COMPLAINT QUOTE
'postop shortness of breath, had total hysterectomy on December 13th'. denies chest pain. or calf tenderness, c/o SOB started 2 days after surgery.

## 2020-01-06 NOTE — ED PROVIDER NOTE - NSPTACCESSSVCSAPPTDETAILS_ED_ALL_ED_FT
Call your ob/gyn tomorrow to discuss your ED visit and arrange follow up.  Can use albuterol inhaler as dispensed 2 puffs every 4-6 hours as needed for shortness of breath.

## 2020-01-06 NOTE — ED PROVIDER NOTE - CLINICAL SUMMARY MEDICAL DECISION MAKING FREE TEXT BOX
29y.o. F 3 months s/p , followed by D&C for continued bleeding, s/p CAROLINE 1 month ago for same, continued daily/episodic sob since - iv, labs, cta chest r/o PE, hydrate to prevent DOMITILA

## 2020-01-06 NOTE — ED PROVIDER NOTE - OBJECTIVE STATEMENT
29 y.o. F 29 y.o. F c/o SOB - states she had a csection for twins () in october (about 3 months ago), after csection had continued bleeding, had D&C for RPOC, due to continued bleeding had CAROLINE on 19 (all done at [facility]). States since then has had daily episodes of chest tightness/sob, mentioned to ob last week, was advised to go to ED if worsening. today having intermittent shooting pains into right occipital area/behind ear, quick/sharp pains. came in. no other neuro symptoms.

## 2020-01-08 RX ORDER — LEVOTHYROXINE SODIUM 125 MCG
1 TABLET ORAL
Qty: 0 | Refills: 0 | DISCHARGE

## 2020-01-15 ENCOUNTER — APPOINTMENT (OUTPATIENT)
Dept: OBGYN | Facility: CLINIC | Age: 30
End: 2020-01-15
Payer: MEDICAID

## 2020-01-15 VITALS
SYSTOLIC BLOOD PRESSURE: 114 MMHG | BODY MASS INDEX: 27.48 KG/M2 | WEIGHT: 140 LBS | HEIGHT: 60 IN | DIASTOLIC BLOOD PRESSURE: 70 MMHG

## 2020-01-15 DIAGNOSIS — E06.3 AUTOIMMUNE THYROIDITIS: ICD-10-CM

## 2020-01-15 DIAGNOSIS — Z09 ENCOUNTER FOR FOLLOW-UP EXAMINATION AFTER COMPLETED TREATMENT FOR CONDITIONS OTHER THAN MALIGNANT NEOPLASM: ICD-10-CM

## 2020-01-15 PROBLEM — O24.419 GESTATIONAL DIABETES MELLITUS IN PREGNANCY, UNSPECIFIED CONTROL: Chronic | Status: ACTIVE | Noted: 2020-01-06

## 2020-01-15 PROBLEM — E03.9 HYPOTHYROIDISM, UNSPECIFIED: Chronic | Status: ACTIVE | Noted: 2020-01-06

## 2020-01-15 PROCEDURE — 99024 POSTOP FOLLOW-UP VISIT: CPT

## 2020-01-15 PROCEDURE — 36415 COLL VENOUS BLD VENIPUNCTURE: CPT

## 2020-01-15 RX ORDER — NITROFURANTOIN MACROCRYSTALS 100 MG/1
100 CAPSULE ORAL
Qty: 14 | Refills: 0 | Status: COMPLETED | COMMUNITY
Start: 2019-12-23 | End: 2020-01-15

## 2020-01-15 RX ORDER — BLOOD SUGAR DIAGNOSTIC
STRIP MISCELLANEOUS 4 TIMES DAILY
Qty: 1 | Refills: 5 | Status: COMPLETED | COMMUNITY
Start: 2019-09-03 | End: 2020-01-15

## 2020-01-15 RX ORDER — URINE ACETONE TEST STRIPS
STRIP MISCELLANEOUS
Qty: 30 | Refills: 3 | Status: COMPLETED | COMMUNITY
Start: 2019-09-06 | End: 2020-01-15

## 2020-01-15 RX ORDER — ASPIRIN 81 MG
81 TABLET, DELAYED RELEASE (ENTERIC COATED) ORAL
Refills: 0 | Status: COMPLETED | COMMUNITY
End: 2020-01-15

## 2020-01-15 RX ORDER — BLOOD-GLUCOSE METER
W/DEVICE KIT MISCELLANEOUS
Qty: 1 | Refills: 0 | Status: COMPLETED | COMMUNITY
Start: 2019-09-03 | End: 2020-01-15

## 2020-01-15 NOTE — HISTORY OF PRESENT ILLNESS
[3/10] : the patient reports pain that is 3/10 in severity [Dysuria] : dysuria [Vaginal Bleeding] : vaginal bleeding [Clean/Dry/Intact] : clean, dry and intact [Mild] : mild vaginal bleeding [Excellent Pain Control] : has excellent pain control [Doing Well] : is doing well [No Sign of Infection] : is showing no signs of infection [Fever] : no fever [Chills] : no chills [Nausea] : no nausea [Vomiting] : no vomiting [Diarrhea] : no diarrhea [Pelvic Pressure] : no pelvic pressure [Vaginal Discharge] : no vaginal discharge [Constipation] : no constipation [de-identified] : s/p DVC --> CAROLINE, b/l salpingectomy for PPH [de-identified] : Will send urine for culture and macrobid to pharmacy for empiric treatment; affirm sent; pathology reviewed; c/w retained POC confirmed on pathology and d/w pt and

## 2020-02-26 ENCOUNTER — APPOINTMENT (OUTPATIENT)
Dept: OBGYN | Facility: CLINIC | Age: 30
End: 2020-02-26
Payer: MEDICAID

## 2020-02-26 VITALS
WEIGHT: 120 LBS | SYSTOLIC BLOOD PRESSURE: 140 MMHG | HEIGHT: 60 IN | BODY MASS INDEX: 23.56 KG/M2 | DIASTOLIC BLOOD PRESSURE: 80 MMHG

## 2020-02-26 DIAGNOSIS — B96.89 ACUTE VAGINITIS: ICD-10-CM

## 2020-02-26 DIAGNOSIS — Z90.710 ACQUIRED ABSENCE OF BOTH CERVIX AND UTERUS: ICD-10-CM

## 2020-02-26 DIAGNOSIS — N76.0 ACUTE VAGINITIS: ICD-10-CM

## 2020-02-26 DIAGNOSIS — N89.8 OTHER SPECIFIED NONINFLAMMATORY DISORDERS OF VAGINA: ICD-10-CM

## 2020-02-26 PROCEDURE — 99213 OFFICE O/P EST LOW 20 MIN: CPT | Mod: 24

## 2020-02-26 NOTE — PHYSICAL EXAM
[Awake] : awake [Alert] : alert [Acute Distress] : no acute distress [Mass] : no breast mass [Soft] : soft [Nipple Discharge] : no nipple discharge [Axillary LAD] : no axillary lymphadenopathy [Oriented x3] : oriented to person, place, and time [Tender] : non tender [Normal] : external genitalia [No Bleeding] : there was no active vaginal bleeding [Uterine Adnexae] : were not tender and not enlarged [Absent] : absent [Discharge] : had no discharge

## 2020-02-27 LAB
CANDIDA VAG CYTO: NOT DETECTED
G VAGINALIS+PREV SP MTYP VAG QL MICRO: NOT DETECTED
T VAGINALIS VAG QL WET PREP: NOT DETECTED

## 2020-07-06 LAB
25(OH)D3 SERPL-MCNC: 26.1 NG/ML
ALBUMIN SERPL ELPH-MCNC: 5.1 G/DL
ALP BLD-CCNC: 115 U/L
ALT SERPL-CCNC: 14 U/L
ANION GAP SERPL CALC-SCNC: 17 MMOL/L
AST SERPL-CCNC: 16 U/L
BASOPHILS # BLD AUTO: 0.07 K/UL
BASOPHILS NFR BLD AUTO: 1 %
BILIRUB SERPL-MCNC: 0.6 MG/DL
BUN SERPL-MCNC: 13 MG/DL
CALCIUM SERPL-MCNC: 9.6 MG/DL
CHLORIDE SERPL-SCNC: 100 MMOL/L
CO2 SERPL-SCNC: 23 MMOL/L
CREAT SERPL-MCNC: 0.61 MG/DL
EOSINOPHIL # BLD AUTO: 0.59 K/UL
EOSINOPHIL NFR BLD AUTO: 8.7 %
ESTIMATED AVERAGE GLUCOSE: 100 MG/DL
GLUCOSE SERPL-MCNC: 72 MG/DL
HBA1C MFR BLD HPLC: 5.1 %
HCT VFR BLD CALC: 43.1 %
HGB BLD-MCNC: 13.8 G/DL
IMM GRANULOCYTES NFR BLD AUTO: 0.1 %
LYMPHOCYTES # BLD AUTO: 2.35 K/UL
LYMPHOCYTES NFR BLD AUTO: 34.8 %
MAN DIFF?: NORMAL
MCHC RBC-ENTMCNC: 28.4 PG
MCHC RBC-ENTMCNC: 32 GM/DL
MCV RBC AUTO: 88.7 FL
MONOCYTES # BLD AUTO: 0.51 K/UL
MONOCYTES NFR BLD AUTO: 7.5 %
NEUTROPHILS # BLD AUTO: 3.23 K/UL
NEUTROPHILS NFR BLD AUTO: 47.9 %
PLATELET # BLD AUTO: 287 K/UL
POTASSIUM SERPL-SCNC: 4.8 MMOL/L
PROT SERPL-MCNC: 7.7 G/DL
RBC # BLD: 4.86 M/UL
RBC # FLD: 14.5 %
SODIUM SERPL-SCNC: 140 MMOL/L
T4 FREE SERPL-MCNC: 1.1 NG/DL
TSH SERPL-ACNC: 9.73 UIU/ML
WBC # FLD AUTO: 6.76 K/UL

## 2020-10-09 DIAGNOSIS — N89.8 OTHER SPECIFIED NONINFLAMMATORY DISORDERS OF VAGINA: ICD-10-CM

## 2020-10-14 LAB
T4 FREE SERPL-MCNC: 1.3 NG/DL
TSH SERPL-ACNC: 2.55 UIU/ML

## 2020-11-06 NOTE — ED ADULT NURSE NOTE - IS THE PATIENT ABLE TO BE SCREENED?
[facility], [facility] received a viral test for COVID-19. They were educated on isolation and quarantine as appropriate. For any symptoms, they were directed to seek care from their PCP, given contact information to establish with a doctor, directed to an urgent care or the emergency room.
Yes

## 2020-11-24 ENCOUNTER — APPOINTMENT (OUTPATIENT)
Dept: ENDOCRINOLOGY | Facility: CLINIC | Age: 30
End: 2020-11-24
Payer: MEDICAID

## 2020-11-24 VITALS
BODY MASS INDEX: 25.39 KG/M2 | WEIGHT: 130 LBS | HEART RATE: 90 BPM | TEMPERATURE: 98.2 F | DIASTOLIC BLOOD PRESSURE: 70 MMHG | OXYGEN SATURATION: 98 % | SYSTOLIC BLOOD PRESSURE: 121 MMHG

## 2020-11-24 PROCEDURE — 99212 OFFICE O/P EST SF 10 MIN: CPT | Mod: 25

## 2020-11-24 PROCEDURE — 36415 COLL VENOUS BLD VENIPUNCTURE: CPT

## 2020-11-24 RX ORDER — FLUCONAZOLE 150 MG/1
150 TABLET ORAL
Qty: 1 | Refills: 0 | Status: DISCONTINUED | COMMUNITY
Start: 2020-10-09 | End: 2020-11-24

## 2020-11-24 RX ORDER — LEVOTHYROXINE SODIUM 0.05 MG/1
50 TABLET ORAL DAILY
Qty: 90 | Refills: 1 | Status: DISCONTINUED | COMMUNITY
Start: 2019-05-13 | End: 2020-11-24

## 2020-11-24 RX ORDER — THIAMINE MONONITRATE, RIBOFLAVIN, PYRIDOXINE HYDROCHLORIDE, CYANOCOBALAMIN, ASCORBIC ACID, NIACIN, FOLIC ACID, FERROUS FUMARATE, CALCIUM CARBONATE, CHOLECALCIFEROL, VITAMIN E ACETATE, POTASSIUM IODIDE, ZINC OXIDE, CHOLINE BITARTRATE, AND DOCONEXENT
27-1 & 250 KIT DAILY
Qty: 1 | Refills: 11 | Status: DISCONTINUED | COMMUNITY
Start: 2019-06-14 | End: 2020-11-24

## 2020-12-01 ENCOUNTER — NON-APPOINTMENT (OUTPATIENT)
Age: 30
End: 2020-12-01

## 2020-12-01 LAB
25(OH)D3 SERPL-MCNC: 36.9 NG/ML
ALBUMIN SERPL ELPH-MCNC: 4.9 G/DL
ALP BLD-CCNC: 80 U/L
ALT SERPL-CCNC: 11 U/L
ANION GAP SERPL CALC-SCNC: 14 MMOL/L
AST SERPL-CCNC: 17 U/L
BASOPHILS # BLD AUTO: 0.05 K/UL
BASOPHILS NFR BLD AUTO: 0.6 %
BILIRUB SERPL-MCNC: 0.7 MG/DL
BUN SERPL-MCNC: 16 MG/DL
CALCIUM SERPL-MCNC: 9.3 MG/DL
CHLORIDE SERPL-SCNC: 106 MMOL/L
CO2 SERPL-SCNC: 18 MMOL/L
CREAT SERPL-MCNC: 0.92 MG/DL
EOSINOPHIL # BLD AUTO: 0.3 K/UL
EOSINOPHIL NFR BLD AUTO: 3.6 %
ESTIMATED AVERAGE GLUCOSE: 105 MG/DL
GLUCOSE SERPL-MCNC: 77 MG/DL
HBA1C MFR BLD HPLC: 5.3 %
HCT VFR BLD CALC: 41.7 %
HGB BLD-MCNC: 13.5 G/DL
IMM GRANULOCYTES NFR BLD AUTO: 0.4 %
LYMPHOCYTES # BLD AUTO: 2.06 K/UL
LYMPHOCYTES NFR BLD AUTO: 24.7 %
MAN DIFF?: NORMAL
MCHC RBC-ENTMCNC: 27.8 PG
MCHC RBC-ENTMCNC: 32.4 GM/DL
MCV RBC AUTO: 86 FL
MONOCYTES # BLD AUTO: 0.6 K/UL
MONOCYTES NFR BLD AUTO: 7.2 %
NEUTROPHILS # BLD AUTO: 5.3 K/UL
NEUTROPHILS NFR BLD AUTO: 63.5 %
PLATELET # BLD AUTO: 291 K/UL
POTASSIUM SERPL-SCNC: 4.3 MMOL/L
PROT SERPL-MCNC: 6.9 G/DL
RBC # BLD: 4.85 M/UL
RBC # FLD: 13.1 %
SODIUM SERPL-SCNC: 139 MMOL/L
T4 FREE SERPL-MCNC: 1.1 NG/DL
TSH SERPL-ACNC: 1.72 UIU/ML
WBC # FLD AUTO: 8.34 K/UL

## 2020-12-01 NOTE — ASSESSMENT
[FreeTextEntry1] : 31 yo F with PMH Hashimoto's thyroiditis\par \par 1. Hashimoto's thyroiditis - will check TFTs today. . Will continue to monitor . \par \par

## 2020-12-01 NOTE — HISTORY OF PRESENT ILLNESS
[FreeTextEntry1] : 31 yo F with PMH Hashimotos thyroiditis for initial evaluation of thyroid disease,\par \par SY 12/2/19 - twin gestation\par \par Prior Endocrinologist: Dr Chio Chow ( last seen 11/2017)\par Diagnosed with hypothyroidism about 10 years ago\par Prior to pregnancy she was maintained on LT4 2 tabs 25 mcg M-F and 1 tabs Sa and Sun\par She denies FHx of thyroid disease. \par She denies history of radiation to the head or neck. \par She denies history of amiodarone or lithium use.\par No recent steroids \par Thyroid US several years ago - no nodules reported\par Always had regular menstruation\par TAhysterectomy due to bleed postpartum  \par \par

## 2020-12-16 ENCOUNTER — NON-APPOINTMENT (OUTPATIENT)
Age: 30
End: 2020-12-16

## 2020-12-23 PROBLEM — N76.0 BACTERIAL VAGINOSIS: Status: RESOLVED | Noted: 2019-12-23 | Resolved: 2020-12-23

## 2021-01-06 ENCOUNTER — APPOINTMENT (OUTPATIENT)
Dept: OBGYN | Facility: CLINIC | Age: 31
End: 2021-01-06
Payer: MEDICAID

## 2021-01-06 VITALS
HEIGHT: 60 IN | WEIGHT: 133 LBS | BODY MASS INDEX: 26.11 KG/M2 | DIASTOLIC BLOOD PRESSURE: 70 MMHG | SYSTOLIC BLOOD PRESSURE: 120 MMHG

## 2021-01-06 DIAGNOSIS — N89.8 OTHER SPECIFIED NONINFLAMMATORY DISORDERS OF VAGINA: ICD-10-CM

## 2021-01-06 PROCEDURE — 99395 PREV VISIT EST AGE 18-39: CPT

## 2021-01-06 PROCEDURE — 99072 ADDL SUPL MATRL&STAF TM PHE: CPT

## 2021-01-06 NOTE — HISTORY OF PRESENT ILLNESS
[Yes] : Patient has concerns regarding sex [Currently Active] : currently active [Men] : men [FreeTextEntry1] : as per HPI

## 2021-01-06 NOTE — COUNSELING
[Nutrition/ Exercise/ Weight Management] : nutrition, exercise, weight management [Vitamins/Supplements] : vitamins/supplements [FreeTextEntry2] : Depression/Anxiety

## 2021-01-06 NOTE — PHYSICAL EXAM
[Appropriately responsive] : appropriately responsive [Alert] : alert [No Acute Distress] : no acute distress [Soft] : soft [Non-tender] : non-tender [Non-distended] : non-distended [No HSM] : No HSM [No Lesions] : no lesions [No Mass] : no mass [Oriented x3] : oriented x3 [Labia Majora] : normal [Labia Minora] : normal [Normal] : normal [Absent] : absent [Uterine Adnexae] : normal

## 2021-01-06 NOTE — DISCUSSION/SUMMARY
[FreeTextEntry1] : - STI testing offered; declined\par - discussed fluconazole for tx of postcoital yeast vaginitis\par - Affirm obtained\par - Diflucan sent to pharmacy for clinical e/o yeast infection\par - Vulvar hygiene reviewed\par - referral given for behavioral health for depression/anxiety\par \par

## 2021-02-18 NOTE — HISTORY OF PRESENT ILLNESS
[0/10] : no pain reported [Clean/Dry/Intact] : clean, dry and intact [Normal] : the vagina was normal [None] : no vaginal bleeding [Healed] : healed [Doing Well] : is doing well [Excellent Pain Control] : has excellent pain control [Initial Evaluation] : an initial evaluation [No Sign of Infection] : is showing no signs of infection [Sutures Removed] : sutures were removed [No [place]] : to avoid sexual intercourse [Chills] : no chills [Fever] : no fever [Nausea] : no nausea [Vomiting] : no vomiting [Erythema] : not erythematous [de-identified] : f/u in 4 weeks; TFTs drawn per pt request [de-identified] : s/p DVC-->CAROLINE, b/l salpingectomy for PPH for Retained POC on 12/13/2019

## 2021-05-12 ENCOUNTER — NON-APPOINTMENT (OUTPATIENT)
Age: 31
End: 2021-05-12

## 2021-05-12 RX ORDER — LEVOTHYROXINE SODIUM 0.03 MG/1
25 TABLET ORAL
Qty: 52 | Refills: 3 | Status: ACTIVE | COMMUNITY
Start: 2019-12-02 | End: 1900-01-01

## 2021-05-17 NOTE — OB RN INTRAOPERATIVE NOTE - NSADDBABYBINFO_OBGYN_ALL_OB
ADD BABY B DELIVERY DATE & TIME... HPI:     Protocol:    Interval History:    Change from previous past medical, family or social history:	[] No	[] Yes:    REVIEW OF SYSTEMS  All review of systems negative, except for those marked:  General:		[] Abnormal:  Pulmonary:		[] Abnormal:  Cardiac:                        [] Abnormal:  Gastrointestinal:	            [] Abnormal:  ENT:			[] Abnormal:  Renal/Urologic:		[] Abnormal:  Musculoskeletal		[] Abnormal:  Endocrine:		[] Abnormal:  Hematologic:		[] Abnormal:  Neurologic:		[] Abnormal:  Skin:			[] Abnormal:  Allergy/Immune		[] Abnormal:  Psychiatric:		[] Abnormal:    Allergies    No Known Allergies    Intolerances    etoposide (Short breath)  lorazepam (Sedation/Somnol)    Hematologic/Oncologic Medications:    OTHER MEDICATIONS  (STANDING):  chlorhexidine 2% Topical Cloths - Peds 1 Application(s) Topical daily  clotrimazole  Oral Lozenge - Peds 1 Lozenge Oral two times a day  dextrose 5% + sodium chloride 0.9% - Pediatric 1000 milliLiter(s) IV Continuous <Continuous>  famotidine  Oral Tab/Cap - Peds 20 milliGRAM(s) Oral two times a day  gabapentin Oral Tab/Cap - Peds 300 milliGRAM(s) Oral three times a day  HYDROmorphone IV Intermittent - Peds 0.5 milliGRAM(s) IV Intermittent every 4 hours  OLANZapine  Oral Tab/Cap - Peds 5 milliGRAM(s) Oral at bedtime  polyethylene glycol 3350 Oral Powder - Peds 17 Gram(s) Oral daily  trimethoprim 160 mG/sulfamethoxazole 800 mG oral Tab/Cap - Peds 1 Tablet(s) Oral <User Schedule>    MEDICATIONS  (PRN):  acetaminophen  IV Intermittent - Peds. 1000 milliGRAM(s) IV Intermittent every 6 hours PRN Mild Pain (1 - 3), Moderate Pain (4 -  6)  ondansetron IV Intermittent - Peds 8 milliGRAM(s) IV Intermittent every 8 hours PRN Nausea/Vomiting      Diet: Diet, Regular - Pediatric (05-15-21 @ 18:04)      Vital Signs Last 24 Hrs  T(C): 36.8 (17 May 2021 06:20), Max: 37.1 (16 May 2021 22:31)  T(F): 98.2 (17 May 2021 06:20), Max: 98.7 (16 May 2021 22:31)  HR: 99 (17 May 2021 06:20) (99 - 115)  BP: 107/65 (17 May 2021 06:20) (105/53 - 115/73)  BP(mean): --  RR: 18 (17 May 2021 06:20) (18 - 20)  SpO2: 99% (17 May 2021 06:20) (97% - 100%)  I&O's Summary    16 May 2021 07:01  -  17 May 2021 07:00  --------------------------------------------------------  IN: 2440 mL / OUT: 3800 mL / NET: -1360 mL      Pain Score (0-10):		Lansky/Karnofsky Score:     PATIENT CARE ACCESS  [] Peripheral IV  [] Central Venous Line	[] R	[] L	[] IJ	[] Fem	[] SC			[] Placed:  [] PICC, Date Placed:			[] Broviac – __ Lumen, Date Placed:  [] Mediport, Date Placed:		[] MedComp, Date Placed:  [] Urinary Catheter, Date Placed:  []  Shunt, Date Placed:		Programmable:		[] Yes	[] No  [] Ommaya, Date Placed:  [] Necessity of urinary, arterial, and venous catheters discussed    PHYSICAL EXAM  All physical exam findings normal, except those marked:  Constitutional:	Normal: well appearing, in no apparent distress  		[] Abnormal:  Eyes		Normal: no conjunctival injection, symmetric gaze  		[] Abnormal:  ENT:		Normal: mucus membranes moist, no mouth sores or mucosal bleeding, normal  		dentition, symmetric facies.  		[] Abnormal:  Neck		Normal: no thyromegaly or masses appreciated  		[] Abnormal:  Cardiovascular	Normal: regular rate, normal S1, S2, no murmurs, rubs or gallops  		[] Abnormal:  Respiratory	Normal: clear to auscultation bilaterally, no wheezing  		[] Abnormal:  Abdominal	Normal: normoactive bowel sounds, soft, NT, no hepatosplenomegaly, no   		masses  		[] Abnormal:  		Normal normal genitalia, testes descended  		[] Abnormal:  Lymphatic	Normal: no adenopathy appreciated  		[] Abnormal:  Extremities	Normal: FROM x4, no cyanosis or edema, symmetric pulses  		[] Abnormal:  Skin		Normal: normal appearance, no rash, nodules, vesicles, ulcers or erythema, CVL  		site well healed with no erythema or pain  		[] Abnormal:  Neurologic	Normal: no focal deficits, gait normal and normal motor exam.  		[] Abnormal:  Psychiatric	Normal: affect appropriate  		[] Abnormal:  Musculoskeletal		Normal: full range of motion and no deformities appreciated, no masses   			and normal strength in all extremities.  			[] Abnormal:    Lab Results:  (05-17 @ 01:27):                  9.4                Neutrophils% (auto):40.4   25.19)-----------(31      Neutrophils# (auto):11.94           28.6                  Lymphocytes% (auto):17.5                                    Eosinphils% (auto):0.0       Manual Diff: N%:--    L%:--    M%:--    E%:--    Baso%:--    Bands%:7.0   blasts%:3.5      Differential Automatic [] Manual []     Differential:	[] Automated		[] Manual    05-17    135  |  102  |  6<L>  ----------------------------<  122<H>  3.4<L>   |  23  |  0.71    Ca    8.9      17 May 2021 01:27  Phos  3.4     05-17  Mg     1.7     05-17    TPro  6.1  /  Alb  3.5  /  TBili  <0.2  /  DBili  x   /  AST  24  /  ALT  5   /  AlkPhos  78  05-17    LIVER FUNCTIONS - ( 17 May 2021 01:27 )  Alb: 3.5 g/dL / Pro: 6.1 g/dL / ALK PHOS: 78 U/L / ALT: 5 U/L / AST: 24 U/L / GGT: x                 MICROBIOLOGY/CULTURES:      RADIOLOGY RESULTS:    Toxicities (with grade)  1.  2.  3.  4.      [] Counseling/discharge planning start time:		End time:		Total Time:  [] Total critical care time spent by the attending physician: __ minutes, excluding procedure time. Problem Dx:  Ovarian germ cell tumor   Progressive disease    Protocol: [id number]   Day: 13 (chemo on HOLD due to progression)    Interval History: No acute overnight events.     Change from previous past medical, family or social history:	[x] No	[] Yes:    REVIEW OF SYSTEMS  All review of systems negative, except for those marked:  General:		[] Abnormal:  Pulmonary:		[] Abnormal:  Cardiac:                        [] Abnormal:  Gastrointestinal:	            [] Abnormal:  ENT:			[] Abnormal:  Renal/Urologic:		[] Abnormal:  Musculoskeletal		[] Abnormal:  Endocrine:		[] Abnormal:  Hematologic:		[] Abnormal:  Neurologic:		[] Abnormal:  Skin:			[] Abnormal:  Allergy/Immune		[] Abnormal:  Psychiatric:		[] Abnormal:    Allergies    No Known Allergies    Intolerances    etoposide (Short breath)  lorazepam (Sedation/Somnol)    Hematologic/Oncologic Medications:    OTHER MEDICATIONS  (STANDING):  chlorhexidine 2% Topical Cloths - Peds 1 Application(s) Topical daily  clotrimazole  Oral Lozenge - Peds 1 Lozenge Oral two times a day  dextrose 5% + sodium chloride 0.9% - Pediatric 1000 milliLiter(s) IV Continuous <Continuous>  famotidine  Oral Tab/Cap - Peds 20 milliGRAM(s) Oral two times a day  gabapentin Oral Tab/Cap - Peds 300 milliGRAM(s) Oral three times a day  HYDROmorphone IV Intermittent - Peds 0.5 milliGRAM(s) IV Intermittent every 4 hours  OLANZapine  Oral Tab/Cap - Peds 5 milliGRAM(s) Oral at bedtime  polyethylene glycol 3350 Oral Powder - Peds 17 Gram(s) Oral daily  trimethoprim 160 mG/sulfamethoxazole 800 mG oral Tab/Cap - Peds 1 Tablet(s) Oral <User Schedule>    MEDICATIONS  (PRN):  acetaminophen  IV Intermittent - Peds. 1000 milliGRAM(s) IV Intermittent every 6 hours PRN Mild Pain (1 - 3), Moderate Pain (4 -  6)  ondansetron IV Intermittent - Peds 8 milliGRAM(s) IV Intermittent every 8 hours PRN Nausea/Vomiting      Diet: Diet, Regular - Pediatric (05-15-21 @ 18:04)      Vital Signs Last 24 Hrs  T(C): 36.8 (17 May 2021 06:20), Max: 37.1 (16 May 2021 22:31)  T(F): 98.2 (17 May 2021 06:20), Max: 98.7 (16 May 2021 22:31)  HR: 99 (17 May 2021 06:20) (99 - 115)  BP: 107/65 (17 May 2021 06:20) (105/53 - 115/73)  BP(mean): --  RR: 18 (17 May 2021 06:20) (18 - 20)  SpO2: 99% (17 May 2021 06:20) (97% - 100%)  I&O's Summary    16 May 2021 07:01  -  17 May 2021 07:00  --------------------------------------------------------  IN: 2440 mL / OUT: 3800 mL / NET: -1360 mL      Pain Score (0-10):		Lansky/Karnofsky Score:     PATIENT CARE ACCESS  [] Peripheral IV  [] Central Venous Line	[] R	[] L	[] IJ	[] Fem	[] SC			[] Placed:  [] PICC, Date Placed:			[] Broviac – __ Lumen, Date Placed:  [] Mediport, Date Placed:		[] MedComp, Date Placed:  [] Urinary Catheter, Date Placed:  []  Shunt, Date Placed:		Programmable:		[] Yes	[] No  [] Ommaya, Date Placed:  [] Necessity of urinary, arterial, and venous catheters discussed    PHYSICAL EXAM  All physical exam findings normal, except those marked:  Constitutional:	Normal: well appearing, in no apparent distress  		[] Abnormal:  Eyes		Normal: no conjunctival injection, symmetric gaze  		[] Abnormal:  ENT:		Normal: mucus membranes moist, no mouth sores or mucosal bleeding, normal  		dentition, symmetric facies.  		[] Abnormal:  Neck		Normal: no thyromegaly or masses appreciated  		[] Abnormal:  Cardiovascular	Normal: regular rate, normal S1, S2, no murmurs, rubs or gallops  		[] Abnormal:  Respiratory	Normal: clear to auscultation bilaterally, no wheezing  		[] Abnormal:  Abdominal	Normal: normoactive bowel sounds, soft, NT, no hepatosplenomegaly, no   		masses  		[] Abnormal:  		Normal normal genitalia, testes descended  		[] Abnormal:  Lymphatic	Normal: no adenopathy appreciated  		[] Abnormal:  Extremities	Normal: FROM x4, no cyanosis or edema, symmetric pulses  		[] Abnormal:  Skin		Normal: normal appearance, no rash, nodules, vesicles, ulcers or erythema, CVL  		site well healed with no erythema or pain  		[] Abnormal:  Neurologic	Normal: no focal deficits, gait normal and normal motor exam.  		[] Abnormal:  Psychiatric	Normal: affect appropriate  		[] Abnormal:  Musculoskeletal		Normal: full range of motion and no deformities appreciated, no masses   			and normal strength in all extremities.  			[] Abnormal:    Lab Results:  (05-17 @ 01:27):                  9.4                Neutrophils% (auto):40.4   25.19)-----------(31      Neutrophils# (auto):11.94           28.6                  Lymphocytes% (auto):17.5                                    Eosinphils% (auto):0.0       Manual Diff: N%:--    L%:--    M%:--    E%:--    Baso%:--    Bands%:7.0   blasts%:3.5      Differential Automatic [] Manual []     Differential:	[] Automated		[] Manual    05-17    135  |  102  |  6<L>  ----------------------------<  122<H>  3.4<L>   |  23  |  0.71    Ca    8.9      17 May 2021 01:27  Phos  3.4     05-17  Mg     1.7     05-17    TPro  6.1  /  Alb  3.5  /  TBili  <0.2  /  DBili  x   /  AST  24  /  ALT  5   /  AlkPhos  78  05-17    LIVER FUNCTIONS - ( 17 May 2021 01:27 )  Alb: 3.5 g/dL / Pro: 6.1 g/dL / ALK PHOS: 78 U/L / ALT: 5 U/L / AST: 24 U/L / GGT: x                 MICROBIOLOGY/CULTURES:      RADIOLOGY RESULTS:    Toxicities (with grade)  1.  2.  3.  4.      [] Counseling/discharge planning start time:		End time:		Total Time:  [] Total critical care time spent by the attending physician: __ minutes, excluding procedure time. Problem Dx:  Ovarian germ cell tumor   Progressive disease    Protocol: [id number] (chemo on HOLD due to progression of metastatic disease)    Interval History: No acute overnight events. States that pain is well controlled on Dilaudid and this morning is 3/10. Complaining of pain (described as "soreness") in lower back (paraspinal), posterior thighs b/l, lower abdomen, and upper arms over bicep b/l. Complaining of intermittent midsternal chest pain that resolved. Also complaining of difficulty urinating, with urinary retention.     Change from previous past medical, family or social history:	[x] No	[] Yes:    REVIEW OF SYSTEMS  All review of systems negative, except for those marked:  General:		[] Abnormal:  Pulmonary:		[] Abnormal:  Cardiac:                        [] Abnormal:  Gastrointestinal:	            [] Abnormal:  ENT:			[] Abnormal:  Renal/Urologic:		[] Abnormal:  Musculoskeletal		[] Abnormal:  Endocrine:		[] Abnormal:  Hematologic:		[] Abnormal:  Neurologic:		[] Abnormal:  Skin:			[] Abnormal:  Allergy/Immune		[] Abnormal:  Psychiatric:		[] Abnormal:    Allergies    No Known Allergies    Intolerances    etoposide (Short breath)  lorazepam (Sedation/Somnol)    Hematologic/Oncologic Medications:    OTHER MEDICATIONS  (STANDING):  chlorhexidine 2% Topical Cloths - Peds 1 Application(s) Topical daily  clotrimazole  Oral Lozenge - Peds 1 Lozenge Oral two times a day  dextrose 5% + sodium chloride 0.9% - Pediatric 1000 milliLiter(s) IV Continuous <Continuous>  famotidine  Oral Tab/Cap - Peds 20 milliGRAM(s) Oral two times a day  gabapentin Oral Tab/Cap - Peds 300 milliGRAM(s) Oral three times a day  HYDROmorphone IV Intermittent - Peds 0.5 milliGRAM(s) IV Intermittent every 4 hours  OLANZapine  Oral Tab/Cap - Peds 5 milliGRAM(s) Oral at bedtime  polyethylene glycol 3350 Oral Powder - Peds 17 Gram(s) Oral daily  trimethoprim 160 mG/sulfamethoxazole 800 mG oral Tab/Cap - Peds 1 Tablet(s) Oral <User Schedule>    MEDICATIONS  (PRN):  acetaminophen  IV Intermittent - Peds. 1000 milliGRAM(s) IV Intermittent every 6 hours PRN Mild Pain (1 - 3), Moderate Pain (4 -  6)  ondansetron IV Intermittent - Peds 8 milliGRAM(s) IV Intermittent every 8 hours PRN Nausea/Vomiting      Diet: Diet, Regular - Pediatric (05-15-21 @ 18:04)      Vital Signs Last 24 Hrs  T(C): 36.8 (17 May 2021 06:20), Max: 37.1 (16 May 2021 22:31)  T(F): 98.2 (17 May 2021 06:20), Max: 98.7 (16 May 2021 22:31)  HR: 99 (17 May 2021 06:20) (99 - 115)  BP: 107/65 (17 May 2021 06:20) (105/53 - 115/73)  BP(mean): --  RR: 18 (17 May 2021 06:20) (18 - 20)  SpO2: 99% (17 May 2021 06:20) (97% - 100%)  I&O's Summary    16 May 2021 07:01  -  17 May 2021 07:00  --------------------------------------------------------  IN: 2440 mL / OUT: 3800 mL / NET: -1360 mL      Pain Score (0-10):		Lansky/Karnofsky Score:     PATIENT CARE ACCESS  [] Peripheral IV  [] Central Venous Line	[] R	[] L	[] IJ	[] Fem	[] SC			[] Placed:  [] PICC, Date Placed:			[] Broviac – __ Lumen, Date Placed:  [x] Mediport, Date Placed:		[] MedComp, Date Placed:  [] Urinary Catheter, Date Placed:  []  Shunt, Date Placed:		Programmable:		[] Yes	[] No  [] Ommaya, Date Placed:  [] Necessity of urinary, arterial, and venous catheters discussed    PHYSICAL EXAM  GENERAL: Awake, alert and interactive, no acute distress, appears comfortable  HEENT: Normocephalic, atraumatic, AOM intact, no conjunctivitis or scleral icterus  MOUTH: Mucous membranes moist  NECK: Supple  CARDIAC: Regular rate and rhythm, +S1/S2, no murmurs/rubs/gallops  PULM: Clear to auscultation bilaterally, no wheezes/rales/rhonchi, no inspiratory stridor  ABDOMEN: Soft, +diffusely tender to palpation, mildly distended, +bs, no masses palpated  MSK: Range of motion grossly intact, no edema. +tender to palpation over paraspinal muscles in lumbar region, posterior thighs, and b/l upper arms. Mildly tender to palpation over mid-sternum.  NEURO: No focal deficits, no acute change from baseline exam  SKIN: No rash or edema  VASC: Cap refill < 2 sec, 2+ peripheral pulses    Lab Results:  (05-17 @ 01:27):                  9.4                Neutrophils% (auto):40.4   25.19)-----------(31      Neutrophils# (auto):11.94           28.6                  Lymphocytes% (auto):17.5                                    Eosinphils% (auto):0.0       Manual Diff: N%:--    L%:--    M%:--    E%:--    Baso%:--    Bands%:7.0   blasts%:3.5      Differential Automatic [] Manual []     Differential:	[] Automated		[] Manual    05-17    135  |  102  |  6<L>  ----------------------------<  122<H>  3.4<L>   |  23  |  0.71    Ca    8.9      17 May 2021 01:27  Phos  3.4     05-17  Mg     1.7     05-17    TPro  6.1  /  Alb  3.5  /  TBili  <0.2  /  DBili  x   /  AST  24  /  ALT  5   /  AlkPhos  78  05-17    LIVER FUNCTIONS - ( 17 May 2021 01:27 )  Alb: 3.5 g/dL / Pro: 6.1 g/dL / ALK PHOS: 78 U/L / ALT: 5 U/L / AST: 24 U/L / GGT: x           MICROBIOLOGY/CULTURES:    RADIOLOGY RESULTS:    Toxicities (with grade)  1.  2.  3.  4.    [] Counseling/discharge planning start time:		End time:		Total Time:  [] Total critical care time spent by the attending physician: __ minutes, excluding procedure time.

## 2021-05-19 NOTE — HISTORY OF PRESENT ILLNESS
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 She is having all her sonograms and testing at [facility], and has been having followup over there.\par \par She presents today specifically for followup of her GDM control, having met with the nutritionist  last week

## 2021-05-27 NOTE — DISCHARGE NOTE NURSING/CASE MANAGEMENT/SOCIAL WORK - NURSING SECTION COMPLETE
Up ambulated to bathroom with light assist x1. Voided mod amount of urine without difficulty. Ana care taught and done. Back to bed. Kanchan well. Patient/Caregiver provided printed discharge information.

## 2021-06-21 DIAGNOSIS — E04.2 NONTOXIC MULTINODULAR GOITER: ICD-10-CM

## 2021-06-23 ENCOUNTER — APPOINTMENT (OUTPATIENT)
Dept: ENDOCRINOLOGY | Facility: CLINIC | Age: 31
End: 2021-06-23
Payer: MEDICAID

## 2021-06-23 VITALS
DIASTOLIC BLOOD PRESSURE: 80 MMHG | OXYGEN SATURATION: 98 % | WEIGHT: 133 LBS | SYSTOLIC BLOOD PRESSURE: 120 MMHG | HEART RATE: 71 BPM | BODY MASS INDEX: 26.11 KG/M2 | HEIGHT: 60 IN

## 2021-06-23 PROCEDURE — 99212 OFFICE O/P EST SF 10 MIN: CPT | Mod: 25

## 2021-06-23 RX ORDER — BACILLUS COAGULANS/INULIN 1B-250 MG
CAPSULE ORAL
Refills: 0 | Status: DISCONTINUED | COMMUNITY
End: 2021-06-23

## 2021-06-23 RX ORDER — FLUCONAZOLE 150 MG/1
150 TABLET ORAL
Qty: 2 | Refills: 0 | Status: DISCONTINUED | COMMUNITY
Start: 2020-12-16 | End: 2021-06-23

## 2021-06-23 RX ORDER — FLUCONAZOLE 150 MG/1
150 TABLET ORAL
Qty: 30 | Refills: 0 | Status: DISCONTINUED | COMMUNITY
Start: 2021-01-06 | End: 2021-06-23

## 2021-07-01 ENCOUNTER — NON-APPOINTMENT (OUTPATIENT)
Age: 31
End: 2021-07-01

## 2021-07-01 LAB
T4 FREE SERPL-MCNC: 1.5 NG/DL
TSH SERPL-ACNC: 0.14 UIU/ML

## 2021-07-14 ENCOUNTER — OUTPATIENT (OUTPATIENT)
Dept: OUTPATIENT SERVICES | Facility: HOSPITAL | Age: 31
LOS: 1 days | End: 2021-07-14
Payer: MEDICAID

## 2021-07-14 ENCOUNTER — APPOINTMENT (OUTPATIENT)
Dept: ULTRASOUND IMAGING | Facility: IMAGING CENTER | Age: 31
End: 2021-07-14
Payer: MEDICAID

## 2021-07-14 ENCOUNTER — RESULT REVIEW (OUTPATIENT)
Age: 31
End: 2021-07-14

## 2021-07-14 DIAGNOSIS — Z00.8 ENCOUNTER FOR OTHER GENERAL EXAMINATION: ICD-10-CM

## 2021-07-14 DIAGNOSIS — Z90.710 ACQUIRED ABSENCE OF BOTH CERVIX AND UTERUS: Chronic | ICD-10-CM

## 2021-07-14 DIAGNOSIS — Z98.891 HISTORY OF UTERINE SCAR FROM PREVIOUS SURGERY: Chronic | ICD-10-CM

## 2021-07-14 PROCEDURE — 10005 FNA BX W/US GDN 1ST LES: CPT

## 2021-07-14 PROCEDURE — 88172 CYTP DX EVAL FNA 1ST EA SITE: CPT

## 2021-07-14 PROCEDURE — 88173 CYTOPATH EVAL FNA REPORT: CPT

## 2021-07-14 PROCEDURE — 88173 CYTOPATH EVAL FNA REPORT: CPT | Mod: 26

## 2021-07-20 ENCOUNTER — NON-APPOINTMENT (OUTPATIENT)
Age: 31
End: 2021-07-20

## 2021-07-26 ENCOUNTER — NON-APPOINTMENT (OUTPATIENT)
Age: 31
End: 2021-07-26

## 2021-07-27 ENCOUNTER — NON-APPOINTMENT (OUTPATIENT)
Age: 31
End: 2021-07-27

## 2021-07-27 NOTE — ASSESSMENT
[FreeTextEntry1] : 29 yo F with PMH Hashimoto's thyroiditis, MNG\par \par 1. Hashimoto's thyroiditis - will check TFTs today. Will continue to monitor. refer tor thyroid FNA as above.  \par \par

## 2021-07-27 NOTE — HISTORY OF PRESENT ILLNESS
[FreeTextEntry1] : 32 yo F with PMH Hashimoto's thyroiditis, MNG\par \par SY 12/2/19 - twin gestation\par \par Prior Endocrinologist: Dr Chio Chow ( last seen 11/2017)\par Diagnosed with hypothyroidism about 10 years ago\par Prior to pregnancy she was maintained on LT4 2 tabs 25 mcg M-F and 1 tabs Sa and Sun\par She denies FHx of thyroid disease. \par She denies history of radiation to the head or neck. \par She denies history of amiodarone or lithium use.\par No recent steroids \par Thyroid US several years ago - no nodules reported\par Always had regular menstruation\par TAhysterectomy due to bleed postpartum  \par 6/8/21 thyroid ultrasound showed MNG. Referred for FNA of LLL 1.6 cm nodule \par \par

## 2021-09-01 LAB
T4 FREE SERPL-MCNC: 1.2 NG/DL
TSH SERPL-ACNC: 9.17 UIU/ML

## 2022-08-02 NOTE — PROGRESS NOTE ADULT - PROBLEM SELECTOR PLAN 1
(4) rarely moist - Continue with po analgesia  - Increase ambulation once Mg is finished  - Continue regular diet

## 2023-03-13 NOTE — OB RN PATIENT PROFILE - BILL OF RIGHTS/ADMISSION INFORMATION PROVIDED TO:
Patient Cibinqo Counseling: I discussed with the patient the risks of Cibinqo therapy including but not limited to common cold, nausea, headache, cold sores, increased blood CPK levels, dizziness, UTIs, fatigue, acne, and vomitting. Live vaccines should be avoided.  This medication has been linked to serious infections; higher rate of mortality; malignancy and lymphoproliferative disorders; major adverse cardiovascular events; thrombosis; thrombocytopenia and lymphopenia; lipid elevations; and retinal detachment.

## 2023-05-17 ENCOUNTER — APPOINTMENT (OUTPATIENT)
Dept: OBGYN | Facility: CLINIC | Age: 33
End: 2023-05-17
Payer: MEDICAID

## 2023-05-17 VITALS
SYSTOLIC BLOOD PRESSURE: 110 MMHG | HEIGHT: 60 IN | WEIGHT: 135 LBS | DIASTOLIC BLOOD PRESSURE: 70 MMHG | BODY MASS INDEX: 26.5 KG/M2

## 2023-05-17 DIAGNOSIS — Z01.419 ENCOUNTER FOR GYNECOLOGICAL EXAMINATION (GENERAL) (ROUTINE) W/OUT ABNORMAL FINDINGS: ICD-10-CM

## 2023-05-17 DIAGNOSIS — R10.2 PELVIC AND PERINEAL PAIN: ICD-10-CM

## 2023-05-17 DIAGNOSIS — R53.83 OTHER FATIGUE: ICD-10-CM

## 2023-05-17 PROCEDURE — 99395 PREV VISIT EST AGE 18-39: CPT

## 2023-05-17 NOTE — DISCUSSION/SUMMARY
[FreeTextEntry1] : - discussed using silicone based lubricant; pt using water-based now and not always efficacious\par - pt declines vaginal estrogen\par - hormone panel sent\par - pelvic sono referral rendered secondary to pelvic pain w/intercourse

## 2023-05-17 NOTE — PHYSICAL EXAM
[Appropriately responsive] : appropriately responsive [Alert] : alert [No Acute Distress] : no acute distress [Soft] : soft [Non-tender] : non-tender [Non-distended] : non-distended [No HSM] : No HSM [No Lesions] : no lesions [No Mass] : no mass [Oriented x3] : oriented x3 [Examination Of The Breasts] : a normal appearance [No Masses] : no breast masses were palpable [Labia Minora] : normal [Labia Majora] : normal [Normal] : normal [Absent] : absent [Uterine Adnexae] : normal

## 2023-05-24 LAB
ALBUMIN SERPL ELPH-MCNC: 4.9 G/DL
ALP BLD-CCNC: 52 U/L
ALT SERPL-CCNC: 13 U/L
ANION GAP SERPL CALC-SCNC: 13 MMOL/L
AST SERPL-CCNC: 19 U/L
BASOPHILS # BLD AUTO: 0.06 K/UL
BASOPHILS NFR BLD AUTO: 1 %
BILIRUB SERPL-MCNC: 1.3 MG/DL
BUN SERPL-MCNC: 9 MG/DL
CALCIUM SERPL-MCNC: 9.6 MG/DL
CHLORIDE SERPL-SCNC: 104 MMOL/L
CO2 SERPL-SCNC: 22 MMOL/L
CREAT SERPL-MCNC: 0.61 MG/DL
DHEA-S SERPL-MCNC: 387 UG/DL
EGFR: 119 ML/MIN/1.73M2
EOSINOPHIL # BLD AUTO: 0.32 K/UL
EOSINOPHIL NFR BLD AUTO: 5.3 %
ESTRADIOL SERPL-MCNC: 23 PG/ML
FSH SERPL-MCNC: 2.9 IU/L
GLUCOSE SERPL-MCNC: 92 MG/DL
HCT VFR BLD CALC: 39.9 %
HGB BLD-MCNC: 13.6 G/DL
IMM GRANULOCYTES NFR BLD AUTO: 0.2 %
LH SERPL-ACNC: 6.4 IU/L
LYMPHOCYTES # BLD AUTO: 1.72 K/UL
LYMPHOCYTES NFR BLD AUTO: 28.5 %
MAN DIFF?: NORMAL
MCHC RBC-ENTMCNC: 29.6 PG
MCHC RBC-ENTMCNC: 34.1 GM/DL
MCV RBC AUTO: 86.7 FL
MONOCYTES # BLD AUTO: 0.35 K/UL
MONOCYTES NFR BLD AUTO: 5.8 %
NEUTROPHILS # BLD AUTO: 3.58 K/UL
NEUTROPHILS NFR BLD AUTO: 59.2 %
PLATELET # BLD AUTO: 311 K/UL
POTASSIUM SERPL-SCNC: 4.3 MMOL/L
PROGEST SERPL-MCNC: 0.7 NG/ML
PROLACTIN SERPL-MCNC: 14.1 NG/ML
PROT SERPL-MCNC: 7.6 G/DL
RBC # BLD: 4.6 M/UL
RBC # FLD: 13.1 %
SODIUM SERPL-SCNC: 139 MMOL/L
T3FREE SERPL-MCNC: 2.62 PG/ML
T4 FREE SERPL-MCNC: 1.4 NG/DL
TESTOST SERPL-MCNC: 21.2 NG/DL
TSH SERPL-ACNC: 2.03 UIU/ML
WBC # FLD AUTO: 6.04 K/UL

## 2023-06-01 ENCOUNTER — NON-APPOINTMENT (OUTPATIENT)
Age: 33
End: 2023-06-01

## 2023-06-05 ENCOUNTER — APPOINTMENT (OUTPATIENT)
Dept: ULTRASOUND IMAGING | Facility: CLINIC | Age: 33
End: 2023-06-05
Payer: MEDICAID

## 2023-06-05 PROCEDURE — 76830 TRANSVAGINAL US NON-OB: CPT

## 2023-06-05 PROCEDURE — 76856 US EXAM PELVIC COMPLETE: CPT

## 2023-06-07 ENCOUNTER — NON-APPOINTMENT (OUTPATIENT)
Age: 33
End: 2023-06-07

## 2023-11-26 NOTE — DISCHARGE NOTE NURSING/CASE MANAGEMENT/SOCIAL WORK - PATIENT PORTAL LINK FT
You can access the FollowMyHealth Patient Portal offered by [facility] by registering at the following website: [email or web address]. By joining [email or web address]’s FollowMyHealth portal, you will also be able to view your health information using other applications (apps) compatible with our system. Gangrene due to peripheral vascular disease

## 2024-05-06 NOTE — PATIENT PROFILE ADULT - FUNCTIONAL SCREEN CURRENT LEVEL: BATHING, MLM
Addended by: NILAY ESPINAL on: 5/6/2024 06:22 PM     Modules accepted: Orders     2 = assistive person

## 2024-09-23 NOTE — ED ADULT NURSE NOTE - NSHOSCREENINGQ1_ED_ALL_ED
[Normal Appearance] : normal appearance [Well Groomed] : well groomed [General Appearance - In No Acute Distress] : no acute distress [Edema] : no peripheral edema [Respiration, Rhythm And Depth] : normal respiratory rhythm and effort [Exaggerated Use Of Accessory Muscles For Inspiration] : no accessory muscle use [Normal Station and Gait] : the gait and station were normal for the patient's age [] : no rash [No Focal Deficits] : no focal deficits [Oriented To Time, Place, And Person] : oriented to person, place, and time [Affect] : the affect was normal [Mood] : the mood was normal No

## 2024-12-08 NOTE — PROGRESS NOTE ADULT - ASSESSMENT
26y/o  PPD#1 from [facility] in stable condition. No significant PMH. Current issues include severe preeclampsia in this pregnancy. Currently on Mg until 1400 today. Initial (On Arrival)

## 2025-05-09 NOTE — OB RN PATIENT PROFILE - POST PARTUM DEPRESSION SCREEN OB 5
no Well appearing, awake, alert, oriented to person, place, time/situation and in no apparent distress. normal...

## 2025-06-20 NOTE — ED ADULT NURSE NOTE - PAIN RATING/NUMBER SCALE (0-10): ACTIVITY
Called patient to convey instructions for upcoming procedure. No answer, LM to return call to clinic for instructions.    Case request entered per request.       From: Aniket Decker MD   Sent: 10/3/2017   3:39 PM   To: Lucio Drake MD   Subject: cardiac cath outpatient                           Riley Valdes, patient from clinic with CABG and cardiomyopathy. Now more SOB and abnormal stress testing and drop in EF. Can you schedule for cardiac cath?        Patient returned call to clinic. Pre-procedure instructions conveyed, patient has no further questions at this time.    1 no